# Patient Record
Sex: FEMALE | Race: WHITE | NOT HISPANIC OR LATINO | Employment: OTHER | URBAN - METROPOLITAN AREA
[De-identification: names, ages, dates, MRNs, and addresses within clinical notes are randomized per-mention and may not be internally consistent; named-entity substitution may affect disease eponyms.]

---

## 2017-01-23 ENCOUNTER — THERAPY VISIT (OUTPATIENT)
Dept: SLEEP MEDICINE | Facility: CLINIC | Age: 67
End: 2017-01-23
Payer: COMMERCIAL

## 2017-01-23 DIAGNOSIS — G47.9 SLEEP DISTURBANCE: ICD-10-CM

## 2017-01-23 DIAGNOSIS — G47.34 NOCTURNAL HYPOXIA: ICD-10-CM

## 2017-01-23 PROCEDURE — 95810 POLYSOM 6/> YRS 4/> PARAM: CPT | Performed by: INTERNAL MEDICINE

## 2017-01-25 PROBLEM — G47.33 OSA (OBSTRUCTIVE SLEEP APNEA): Status: ACTIVE | Noted: 2017-01-25

## 2017-01-25 NOTE — PROCEDURES
" SLEEP STUDY INTERPRETATION  POLYSOMNOGRAPHY REPORT      Patient: Mary Carrera  YOB: 1950  Study Date: 1/23/2017  MRN: [id number]  Referring Provider: MD Lim Laura  Ordering Provider: MD Cobian Abdullahi    Indications for Polysomnography: The patient is a 66 y old Female who is 5' 5\" and weighs 226.0 lbs.  Her BMI is 37.7, [place] sleepiness scale 3.0 and neck size is 43.0.  Relevant medical history includes depression, hyperlipidemia, obesity, nocturnal hypoxia, snoring and witnessed apnea.. A diagnostic polysomnogram was performed to evaluate for sleep apnea/PLMS/ hypoventilation/hypoxemia.    Polysomnogram Data:  A full night polysomnogram recorded the standard physiologic parameters including EEG, EOG, EMG, ECG, nasal and oral airflow.  Respiratory parameters of chest and abdominal movements were recorded with respiratory inductance plethysmography.  Oxygen saturation was recorded by pulse oximetry.      Sleep Architecture: All stages of sleep were achieved with reduced N3 sleep. Poor sleep efficiency and increased arousals.  The total recording time of the polysomnogram was 494.6 minutes.  The total sleep time was 367.5 minutes.  Sleep latency was normal at 19.5 minutes without the use of a sleep aid.  REM latency was 301.0 minutes.  Arousal index was increased at 80.7 arousals per hour.  Sleep efficiency was decreased at 74.3%.  Wake after sleep onset was 105.5 minutes.  The patient spent 30.3% of total sleep time in Stage N1, 51.8% in Stage N2, 2.9% in Stages N3, and 15.0% in REM.  Time in REM supine was - minutes.    Respiration: Moderate obstructive sleep apnea, REM predominant, may be underestimated with lack of supine sleep.    Events - The polysomnogram revealed a presence of 5 obstructive, 0 central, and 0 mixed apneas resulting in an apnea index of 0.8 events per hour.  There were 177 hypopneas resulting in a hypopnea index of 28.9 events per hour.  The combined apnea/hypopnea " index was 29.7 events per hour.  The REM AHI was 91.6 events per hour.  The supine AHI was non applicable.  The RERA index was 7.0 events per hour.   The RDI was 36.7 events per hour.    Snoring - was reported as loud.    Respiratory rate and pattern - was notable for normal respiratory rate and pattern.    Sustained Sleep Associated Hypoventilation - Transcutaneous carbon dioxide monitoring was used, however significant hypoventilation was not suggested by oximetry, or was not present with a maximum change of 9 mmHg. ABG ordered was not done.    Sleep Associated Hypoxemia - (Greater than 5 minutes O2 sat below 89%) was present.  Baseline oxygen saturation was 93.2%. Lowest oxygen saturation was 75.3%.  Time spent less than or equal to 88% was 16.9 minutes.  Time spent less than or equal to 89% was 34.8 minutes.  36.7 7.0 29.7     Movement Activity: Severe periodic leg movements associated with frequent arousals and were mainly or predominantly at the first half of sleep. No unusual nocturnal sleep behaviors.    Periodic Limb Activity - There were 306 PLMs during the entire study. The PLM index was 50.0 movements per hour.  The PLM Arousal Index was 21.1 per hour.    REM EMG Activity - Excessive transient / sustained muscle activity was not present.    Nocturnal Behavior - Abnormal sleep related behaviors were not noted during NREM / REM sleep.      Bruxism - None apparent.    Cardiac Summary: Normal sinus rhythm throughout the night with frequent multifocal PVC s.  The average pulse rate was 73.2 bpm.  The minimum pulse rate was 34.0 bpm while the maximum pulse rate was 99.9 bpm. The rhythm is normal sinus. Arrhythmias were noted, mainly frequent multifocal premature ventricular contractures.  Cardiac Comments: Normal Sinus Rhythm        Assessment:     Moderate Obstructive Sleep Apnea G47.33    Sleep Hypoxemia G47.36     Periodic Limb Movement Disorder G47.61      Recommendations:    Based on the presence of  moderate obstructive sleep apnea and excessive daytime sleepiness, treatment could be empirically initiated with Auto-titrating PAP therapy with a range of 8 - 16 cmH2O. Recommend clinical follow up with sleep management team.  Recommend repeat polysomnography with full night titration of positive airway pressure therapy for the control of sleep disordered breathing if Auto-PAP therapy  is unsuccessful.    Patient s sleep related hypoxemia may well improve by treating respiratory events, but if persists, would consider nocturnal oximeter to determine the need for nocturnal oxygen and in addition investigate if any underlying causes of hypoxemia including cardiopulmonary disease.    Re-evaluation and pharmacologic management of restless legs syndrome in the presence of periodic limb movements and Ferritin level in next clinic visit.    Advise regarding the risks of drowsy driving.    Suggest optimizing sleep schedule and avoiding sleep deprivation.    Weight management (if BMI > 30).    Follow up primary care doctor.        _____________________________________   electronically signed by: BOOKER MCKAY MD (1/25/17)         CC:  Lynn Lim MD          Range(%) Time in range (min) Time in range (%) Time in or below range (min) Time in or below range (%)   0.0 - 89.0 34.8 7.0% 34.8 7.0%   0.0 - 88.0 16.9 3.4% 16.9 3.4%      29.7 - 91.6

## 2017-02-09 ENCOUNTER — DOCUMENTATION ONLY (OUTPATIENT)
Dept: SLEEP MEDICINE | Facility: CLINIC | Age: 67
End: 2017-02-09

## 2017-02-09 ENCOUNTER — OFFICE VISIT (OUTPATIENT)
Dept: SLEEP MEDICINE | Facility: CLINIC | Age: 67
End: 2017-02-09
Payer: COMMERCIAL

## 2017-02-09 VITALS
WEIGHT: 220 LBS | HEART RATE: 90 BPM | DIASTOLIC BLOOD PRESSURE: 92 MMHG | SYSTOLIC BLOOD PRESSURE: 156 MMHG | BODY MASS INDEX: 36.65 KG/M2 | OXYGEN SATURATION: 96 % | HEIGHT: 65 IN

## 2017-02-09 DIAGNOSIS — G47.33 OSA (OBSTRUCTIVE SLEEP APNEA): Primary | ICD-10-CM

## 2017-02-09 PROCEDURE — 99214 OFFICE O/P EST MOD 30 MIN: CPT | Performed by: FAMILY MEDICINE

## 2017-02-09 NOTE — MR AVS SNAPSHOT
After Visit Summary   2/9/2017    Mary Carrera    MRN: [id number]           Patient Information     Date Of Birth          1950        Visit Information        Provider Department      2/9/2017 9:00 AM Mark Marsh MD [facility]        Today's Diagnoses     MANNIE (obstructive sleep apnea)    -  1       Care Instructions      Your BMI is Body mass index is 36.61 kg/(m^2).  Weight management is a personal decision.  If you are interested in exploring weight loss strategies, the following discussion covers the approaches that may be successful. Body mass index (BMI) is one way to tell whether you are at a healthy weight, overweight, or obese. It measures your weight in relation to your height.  A BMI of 18.5 to 24.9 is in the healthy range. A person with a BMI of 25 to 29.9 is considered overweight, and someone with a BMI of 30 or greater is considered obese. More than two-thirds of American adults are considered overweight or obese.  Being overweight or obese increases the risk for further weight gain. Excess weight may lead to heart disease and diabetes.  Creating and following plans for healthy eating and physical activity may help you improve your health.  Weight control is part of healthy lifestyle and includes exercise, emotional health, and healthy eating habits. Careful eating habits lifelong are the mainstay of weight control. Though there are significant health benefits from weight loss, long-term weight loss with diet alone may be very difficult to achieve- studies show long-term success with dietary management in less than 10% of people. Attaining a healthy weight may be especially difficult to achieve in those with severe obesity. In some cases, medications, devices and surgical management might be considered.  What can you do?  If you are overweight or obese and are interested in methods for weight loss, you should discuss this with your provider.      Consider reducing daily calorie intake by 500 calories.     Keep a food journal.     Avoiding skipping meals, consider cutting portions instead.    Diet combined with exercise helps maintain muscle while optimizing fat loss. Strength training is particularly important for building and maintaining muscle mass. Exercise helps reduce stress, increase energy, and improves fitness. Increasing exercise without diet control, however, may not burn enough calories to loose weight.       Start walking three days a week 10-20 minutes at a time    Work towards walking thirty minutes five days a week     Eventually, increase the speed of your walking for 1-2 minutes at time    In addition, we recommend that you review healthy lifestyles and methods for weight loss available through the National Institutes of Health patient information sites:  http://win.niddk.nih.gov/publications/index.htm    And look into health and wellness programs that may be available through your health insurance provider, employer, local community center, or humberto club.    Weight management plan: Patient was referred to their PCP to discuss a diet and exercise plan.   Your blood pressure was checked while you were in clinic today.  Please read the guidelines below about what these numbers mean and what you should do about them.  Your systolic blood pressure is the top number.  This is the pressure when the heart is pumping.  Your diastolic blood pressure is the bottom number.  This is the pressure in between beats.  If your systolic blood pressure is less than 120 and your diastolic blood pressure is less than 80, then your blood pressure is normal. There is nothing more that you need to do about it  If your systolic blood pressure is 120-139 or your diastolic blood pressure is 80-89, your blood pressure may be higher than it should be.  You should have your blood pressure re-checked within a year by a primary care provider.  If your systolic blood  "pressure is 140 or greater or your diastolic blood pressure is 90 or greater, you may have high blood pressure.  High blood pressure is treatable, but if left untreated over time it can put you at risk for heart attack, stroke, or kidney failure.  You should have your blood pressure re-checked by a primary care provider within the next four weeks.    Schedule CPAP  appointment and also follow up visit within 2 months. Use the CPAP every time you sleep. Monitor blood pressure and follow up with your primary care physician.    Thanks!    Mark Marsh MD, MPH        Follow-ups after your visit        Who to contact     If you have questions or need follow up information about today's clinic visit or your schedule please contact [facility] directly at [phone].  Normal or non-critical lab and imaging results will be communicated to you by MyChart, letter or phone within 4 business days after the clinic has received the results. If you do not hear from us within 7 days, please contact the clinic through [employer] or phone. If you have a critical or abnormal lab result, we will notify you by phone as soon as possible.  Submit refill requests through [employer] or call your pharmacy and they will forward the refill request to us. Please allow 3 business days for your refill to be completed.          Additional Information About Your Visit        [employer] Information     [employer] lets you send messages to your doctor, view your test results, renew your prescriptions, schedule appointments and more. To sign up, go to [email or web address] . Click on \"Log in\" on the left side of the screen, which will take you to the Welcome page. Then click on \"Sign up Now\" on the right side of the page.     You will be asked to enter the access code listed below, as well as some personal information. Please follow the directions to create your username and password.     Your access code is: " [email or web address]  Expires: 5/10/2017  9:38 AM     Your access code will  in 90 days. If you need help or a new code, please call your [facility] clinic or [phone].        Care EveryWhere ID     This is your Care EveryWhere ID. This could be used by other organizations to access your [facility] medical records  [id number]        Your Vitals Were     Pulse Height BMI (Body Mass Index) Pulse Oximetry Last Period       90 1.651 m (5' 5\") 36.61 kg/m2 96% 10/15/2003        Blood Pressure from Last 3 Encounters:   17 157/97   16 132/85   16 163/72    Weight from Last 3 Encounters:   17 99.791 kg (220 lb)   16 102.513 kg (226 lb)   16 109.1 kg (240 lb 8.4 oz)              We Performed the Following     Comprehensive DME        Primary Care Provider Office Phone # Fax #    Lynn Lim [phone]       [facility] [address]  [facility] [address]        Thank you!     Thank you for choosing [facility]  for your care. Our goal is always to provide you with excellent care. Hearing back from our patients is one way we can continue to improve our services. Please take a few minutes to complete the written survey that you may receive in the mail after your visit with us. Thank you!             Your Updated Medication List - Protect others around you: Learn how to safely use, store and throw away your medicines at www.disposemymeds.org.          This list is accurate as of: 17  9:38 AM.  Always use your most recent med list.                   Brand Name Dispense Instructions for use    acetaminophen 325 MG tablet    TYLENOL    100 tablet    Take 2 tablets (650 mg) by mouth every 4 hours as needed for other (surgical pain)       hydrocortisone 0.2 % cream    WESTCORT    45 g    Apply topically 2 times daily       ibuprofen 800 MG tablet    ADVIL/MOTRIN     Take 800 mg by mouth       multivitamin  with lutein Caps " per capsule      Take 1 capsule by mouth daily       oxyCODONE 5 MG IR tablet    ROXICODONE    60 tablet    Take 1-2 tablets (5-10 mg) by mouth every 3 hours as needed for moderate to severe pain       PREVIDENT 5000 BOOSTER PLUS 1.1 % Pste   Generic drug:  Sodium Fluoride          sertraline 100 MG tablet    ZOLOFT    90 tablet    Take 1 tablet (100 mg) by mouth daily

## 2017-02-09 NOTE — PROGRESS NOTES
"[facility]  Sleep Medicine Follow Up Note  February 9, 2017      Mary Carrera MRN# [id number]   Age: 66 year old YOB: 1950     Date of Consultation: February 9, 2017    Primary care provider: Lynn Lim     History taken from: Patient    Mary Carrera is a 66 year old female seen in the Sleep Clinic  for   Chief Complaint   Patient presents with     RECHECK     F/u Psg 1/23          Assessment and Plan:   Diagnoses:  (1) MANNIE  (2) Elevated BP    Discussion:    (1) MANNIE: Pt has moderate to severe MANNIE. The PSG study results were explained and discussed with the patient. The nature and pathophysiology of MANNIE were discussed and explained. The different treatment options of MANNIE were also reviewed. The benefits of treating the condition were discussed. After detail discussion, the patient will be placed on Auto-titrating PAP therapy with a range of 8 to 16 cmH2O. Auto-CPAP compliance was discussed. Pt was encouraged to use the CPAP every time she sleeps. Lifestyle changes with diet and exercises were encouraged. Pt will follow up within 2 months with Dr Cobian.     (2) Elevated BP: Pt was encouraged to monitor BP, keep a BP diary, and follow up with PCP for further assessment and possible management.    Patient understands and agrees with assessment and plan. All questions and concerns were addressed. Treatments were discussed along with their potential adverse interactions and possible side effects.    Pt was asked to not operate any heavy machinery, work at heights, or engage in life-threatening activities if she feels sleepy or \"drowsy.\"     Total of 30 minutes was spent in face to face contact with patient with > 50% in counseling and coordination of care.  Options for treatment and/or follow-up care were reviewed with the patient. Mary Carrera was engaged and actively involved in the decision making process. She verbalized understanding of the options " discussed and was satisfied with the final plan.    RTC in 2 months for follow up of MANNIE (or sooner if develops new or worsening symptoms).    Thank you for allowing me participate in the care of Mary Carrera.         Reason for Follow Up:   CC: 66 year old female pt presents for a follow up visit after completing an in-lab PSG sleep study         History of Present Illness:     Mary Carrera is a 66 year old female with history of major depressive disorder, hyperlipidemia, OA knee, and recent hypoxia post-op as well as acoustic neurinoma who presents to the [facility] in [place] for a follow up visit after completing an in-lab split-night PSG sleep study for evaluation of possible MANNIE. During her initial evaluation with Dr Cobian, the patient that she recently underwent right TKA on 12/5/16 at [facility]. During the hospitalization, she was noticed to have episodes of hypoxia and she required 2 L/min of oxygen. During the hospitalization, she had nocturnal oximetry while on 2 L/min of oxygen. This showed no sleep related hypoxia with O2, with time spent O2 sat below 88% of 4:18 minutes (0.9%), lowest O2 saturation was 72%. The patient was referred for evaluation of SBD. During this initial visit, she explained that she snores during the night and has witnessed apneas. She denied any nocturia. A PSG sleep study was order by Dr Cobian and this showed moderate to severe MANNIE with an AHI of 29.7 events per hour (the condition was REM predominant). Sleep related hypoxemia was also present during the study with 34.8 minutes below 89%. Some increased PLM index was increased at 50.0 movements per hour with a PLM arousal index of 21.1 per hour.    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)  Hand Dominance: RHD    --Coffeanated beverages: One coffee in the morning  --Drowsy driving / near incidents: No         Allergies:     Allergies   Allergen Reactions     No Known Allergies           Past  Medical History:     Past Medical History   Diagnosis Date     Chronic depressive personality disorder      Other and unspecified alcohol dependence, in remission      Hearing loss      Arthritis           Past Surgical History:     Past Surgical History   Procedure Laterality Date     C ligate fallopian tube  1982     Tubal Ligation     Hc removal of leg veins/ulcer  1988 & 1996     C nonspecific procedure  6/96     Acoustic Neuroma   left, dr tran/olivia QUIÑONES revision of cranial nerve  10/2006     recurrent acoustic neuroma     Colonoscopy  2007     WNL     Colonoscopy  5/13/2014     Procedure: COLONOSCOPY;  Surgeon: Priyanka Galvan MD;  Location: [facility]     Rotator cuff repair rt/lt  10/2015     right     Ent surgery       Arthroplasty knee Right 12/5/2016     Procedure: ARTHROPLASTY KNEE;  Surgeon: Severo Conroy MD;  Location: [facility] OR          Social History:     Social History     Social History     Marital Status: Single     Spouse Name: N/A     Number of Children: 3     Years of Education: 16     Occupational History     [employer]     Social History Main Topics     Smoking status: Never Smoker      Smokeless tobacco: Never Used     Alcohol Use: 2.5 oz/week     5 Standard drinks or equivalent per week      Comment: wine     Drug Use: No     Sexual Activity:     Partners: Male     Birth Control/ Protection: Surgical     Other Topics Concern      Service No     Blood Transfusions No     Caffeine Concern No     Occupational Exposure No     Stress Concern Yes     divorce and mental health issues     Weight Concern Yes     Special Diet Yes     low fat     Back Care No     Exercise Yes     Seat Belt No     Self-Exams Yes     Social History Narrative        Smoking:   Social History   Substance Use Topics     Smoking status: Never Smoker      Smokeless tobacco: Never Used     Alcohol Use: 2.5 oz/week     5 Standard drinks or equivalent per week      Comment:  wine     -Illicit drugs: None Reported  -Alcohol intake: More than 5 drinks a week with negative CAGE         Family History:     Family History   Problem Relation Age of Onset     Alcohol/Drug Father      HEART DISEASE Mother      CANCER No family hx of      DIABETES No family hx of           Immunization:     Immunization History   Administered Date(s) Administered     Hepatitis A Vac Ped/Adol-2 Dose 02/10/1999, 07/06/1999     Influenza (H1N1) 12/22/2009     Influenza (IIV3) 01/03/2001, 11/01/2008, 09/01/2009, 10/15/2012, 09/19/2013, 09/25/2013     Influenza Vaccine IM 3yrs+ 4 Valent IIV4 09/25/2015, 09/15/2016     TD (ADULT, 7+) 06/21/1994, 01/30/2004     Tdap (Adacel,Boostrix) 10/01/2011     Varicella 09/27/2015           Medications:     Current Outpatient Prescriptions   Medication Sig     oxyCODONE (ROXICODONE) 5 MG IR tablet Take 1-2 tablets (5-10 mg) by mouth every 3 hours as needed for moderate to severe pain     acetaminophen (TYLENOL) 325 MG tablet Take 2 tablets (650 mg) by mouth every 4 hours as needed for other (surgical pain)     multivitamin  with lutein (OCUVITE WITH LTEIN) CAPS Take 1 capsule by mouth daily     ibuprofen (ADVIL,MOTRIN) 800 MG tablet Take 800 mg by mouth     hydrocortisone (WESTCORT) 0.2 % cream Apply topically 2 times daily     PREVIDENT 5000 BOOSTER PLUS 1.1 % PSTE      sertraline (ZOLOFT) 100 MG tablet Take 1 tablet (100 mg) by mouth daily     No current facility-administered medications for this visit.          Review of Systems:   I have done 14 points of review systems and no obvious new pertinent findings reported.    General: no fever, chills, weakness  HENT: No loss of hearing, vertigo, ear ringing, sore throat, epistaxis  EYES: Diplopia, blurry vision, photophobia.  Pulmonary: No dyspnea, wheezing, cough, sputum, hemoptysis, chest pain  Sleep: No EDS, snoring, insomnia, cataplexy, restless legs, REM behavior   CVS: No chest discomfort, palpitations  GI: No diarrhea,  "constipation, dysphagia, early satiety, bleeding  Renal: No pain on urination, hematuria, freq micturation  Musculoskeletal: No muscle ache, joint pain, swelling  Skin: No rash, ecchymosis, itching, icterus  Neurology: No tingling, weakness, numbness  Heme: No easy bruisibility or bleeding  Allergy: runny nose, sneezing, urticeria  Psychiatry: no change in mood and affect         Physical Examination:   /97 mmHg  Pulse 90  Ht 1.651 m (5' 5\")  Wt 99.791 kg (220 lb)  BMI 36.61 kg/m2  SpO2 96%  LMP 10/15/2003    VS: Reviewed. Elevated BP noted.  General: Alert, oriented, not in distress  HEENT: Normocephalic and atraumatic; NL TM x 2; pupils are isocoric and equally responsive to the light. PERRLA. EOMI. Normal fundoscopic examination; Nasal turbinates are normal with a normal septal alignment;  Mallampati score: Grade IV; Tonsillar hypertrophy: +1; Pharynx with no erythema or exudates.  NECK: neck supple; symmetrical; no lymphadenopathy; no thyromegaly, bruit, JVD noted.  Lungs: both hemithoraces are symmetrical, normal to palpation, no dullness to percussion, auscultation of lungs revealed normal breath sounds with no expirium prolongation, wheezing, rhonci and crackles.  CVS: Normal S1 and S2 heart sounds with no extra heart sounds. No murmur, rubs, or clicks. Normal peripheral pulses throughout with no obvious peripheral edema.  Neurology: awake, alert, and oriented x 3. No obvious gross motor / sensorial deficits with normal strength in all extremities at 5/5 and normal sensation throughout. Cranial nerves are grossly intact with normal II to XII CN functions. Negative Romberg's test with normal gait. DTR are symmetric and normal at 2+/4.  Psychiatry: Mood and affect are appropriate. No SI/HI with adequate insight and judgement.    Mark Marsh MD, MPH  Sleep Medicine Clinic    [facility] [place] [facility] [address], [place] [address]   [phone] Clinic    Total time spent " with patient: 30 min. Over >50% of the time was spent for face to face counseling, education, and evaluation.

## 2017-02-09 NOTE — PATIENT INSTRUCTIONS
Your BMI is Body mass index is 36.61 kg/(m^2).  Weight management is a personal decision.  If you are interested in exploring weight loss strategies, the following discussion covers the approaches that may be successful. Body mass index (BMI) is one way to tell whether you are at a healthy weight, overweight, or obese. It measures your weight in relation to your height.  A BMI of 18.5 to 24.9 is in the healthy range. A person with a BMI of 25 to 29.9 is considered overweight, and someone with a BMI of 30 or greater is considered obese. More than two-thirds of American adults are considered overweight or obese.  Being overweight or obese increases the risk for further weight gain. Excess weight may lead to heart disease and diabetes.  Creating and following plans for healthy eating and physical activity may help you improve your health.  Weight control is part of healthy lifestyle and includes exercise, emotional health, and healthy eating habits. Careful eating habits lifelong are the mainstay of weight control. Though there are significant health benefits from weight loss, long-term weight loss with diet alone may be very difficult to achieve- studies show long-term success with dietary management in less than 10% of people. Attaining a healthy weight may be especially difficult to achieve in those with severe obesity. In some cases, medications, devices and surgical management might be considered.  What can you do?  If you are overweight or obese and are interested in methods for weight loss, you should discuss this with your provider.     Consider reducing daily calorie intake by 500 calories.     Keep a food journal.     Avoiding skipping meals, consider cutting portions instead.    Diet combined with exercise helps maintain muscle while optimizing fat loss. Strength training is particularly important for building and maintaining muscle mass. Exercise helps reduce stress, increase energy, and improves fitness.  Increasing exercise without diet control, however, may not burn enough calories to loose weight.       Start walking three days a week 10-20 minutes at a time    Work towards walking thirty minutes five days a week     Eventually, increase the speed of your walking for 1-2 minutes at time    In addition, we recommend that you review healthy lifestyles and methods for weight loss available through the National Institutes of Health patient information sites:  http://win.niddk.nih.gov/publications/index.htm    And look into health and wellness programs that may be available through your health insurance provider, employer, local community center, or humberto club.    Weight management plan: Patient was referred to their PCP to discuss a diet and exercise plan.   Your blood pressure was checked while you were in clinic today.  Please read the guidelines below about what these numbers mean and what you should do about them.  Your systolic blood pressure is the top number.  This is the pressure when the heart is pumping.  Your diastolic blood pressure is the bottom number.  This is the pressure in between beats.  If your systolic blood pressure is less than 120 and your diastolic blood pressure is less than 80, then your blood pressure is normal. There is nothing more that you need to do about it  If your systolic blood pressure is 120-139 or your diastolic blood pressure is 80-89, your blood pressure may be higher than it should be.  You should have your blood pressure re-checked within a year by a primary care provider.  If your systolic blood pressure is 140 or greater or your diastolic blood pressure is 90 or greater, you may have high blood pressure.  High blood pressure is treatable, but if left untreated over time it can put you at risk for heart attack, stroke, or kidney failure.  You should have your blood pressure re-checked by a primary care provider within the next four weeks.    Schedule CPAP  appointment  and also follow up visit within 2 months. Use the CPAP every time you sleep. Monitor blood pressure and follow up with your primary care physician.    Thanks!    Mark Marsh MD, MPH

## 2017-02-09 NOTE — PROGRESS NOTES
PATIENT CAME INTO [facility] AFTER SEE DOCTOR TO SCHEDULE PAP SETUP APPT. SHE IS SCHEDULED IN [facility] ON 02/22/2017 AT 2PM

## 2017-02-10 DIAGNOSIS — F33.42 DEPRESSION, MAJOR, RECURRENT, IN COMPLETE REMISSION (H): Primary | ICD-10-CM

## 2017-02-10 RX ORDER — SERTRALINE HYDROCHLORIDE 100 MG/1
TABLET, FILM COATED ORAL
Qty: 30 TABLET | Refills: 0 | Status: SHIPPED | OUTPATIENT
Start: 2017-02-10 | End: 2017-03-09

## 2017-02-10 NOTE — TELEPHONE ENCOUNTER
Pending Prescriptions:                       Disp   Refills    sertraline (ZOLOFT) 100 MG tablet [Pharma*30 tab*0            Sig: TAKE 1 TABLET (100 MG) BY MOUTH DAILY    Last refill was 1- for one year.  No appt scheduled at this time.  Please fax, 30 and send to [facility]  [phone] (home) [phone] (work)

## 2017-02-10 NOTE — TELEPHONE ENCOUNTER
Please let the patient know that a 30 day refill has been sent for their prescription.  They are due for a return non-fasting office visit within 30 days.  Failure to schedule an appointment may result in no further refills of his/her medication.

## 2017-02-27 ENCOUNTER — DOCUMENTATION ONLY (OUTPATIENT)
Dept: SLEEP MEDICINE | Facility: CLINIC | Age: 67
End: 2017-02-27

## 2017-02-27 NOTE — PROGRESS NOTES
Patient was offered choice of vendor and chose [facility].  Patient Mary Carrera was set up at [facility] on February 27, 2017. Patient received a Resmed AirSense 10 Auto. Pressures were set at 8-16 cm H2O.   Patient s ramp is 5 cm H2O for Auto and FLEX/EPR is EPR, 2.  Patient received a Resmed Mask name: AIRFIT N20  Nasal mask Size Medium, heated tubing and heated humidifier.  Patient is enrolled in the STM Program and does not need to meet compliance. Patient has a follow up on 4/11/2017 with Dr. Cobian.    Lillian Ybarra

## 2017-03-09 ENCOUNTER — OFFICE VISIT (OUTPATIENT)
Dept: FAMILY MEDICINE | Facility: CLINIC | Age: 67
End: 2017-03-09

## 2017-03-09 ENCOUNTER — TELEPHONE (OUTPATIENT)
Dept: FAMILY MEDICINE | Facility: CLINIC | Age: 67
End: 2017-03-09

## 2017-03-09 VITALS
TEMPERATURE: 98.4 F | WEIGHT: 238.2 LBS | BODY MASS INDEX: 40.67 KG/M2 | SYSTOLIC BLOOD PRESSURE: 160 MMHG | OXYGEN SATURATION: 97 % | DIASTOLIC BLOOD PRESSURE: 88 MMHG | HEIGHT: 64 IN | HEART RATE: 80 BPM

## 2017-03-09 DIAGNOSIS — F33.42 DEPRESSION, MAJOR, RECURRENT, IN COMPLETE REMISSION (H): ICD-10-CM

## 2017-03-09 DIAGNOSIS — I10 BENIGN ESSENTIAL HYPERTENSION: Primary | ICD-10-CM

## 2017-03-09 PROCEDURE — 99213 OFFICE O/P EST LOW 20 MIN: CPT | Performed by: PHYSICIAN ASSISTANT

## 2017-03-09 RX ORDER — LISINOPRIL 10 MG/1
10 TABLET ORAL EVERY MORNING
Qty: 30 TABLET | Refills: 0 | COMMUNITY
Start: 2017-03-09 | End: 2017-04-13

## 2017-03-09 RX ORDER — SERTRALINE HYDROCHLORIDE 100 MG/1
100 TABLET, FILM COATED ORAL DAILY
Qty: 90 TABLET | Refills: 3 | Status: SHIPPED | OUTPATIENT
Start: 2017-03-09 | End: 2018-03-14

## 2017-03-09 RX ORDER — LISINOPRIL 20 MG/1
20 TABLET ORAL DAILY
Qty: 30 TABLET | Refills: 0 | Status: SHIPPED | OUTPATIENT
Start: 2017-03-09 | End: 2017-03-09 | Stop reason: DRUGHIGH

## 2017-03-09 NOTE — TELEPHONE ENCOUNTER
Update for Mary since appt: Talked to Dr. Lim about starting lisinopril. She agreed this was a good idea, but said Mary may want to just do 10 mg daily, where the prescription is for 20 mg. I will call pharmacy to change to 10 mg daily. She can take 1/2 tablet for a week as discussed in office to make sure she is tolerating before increasing to 10 mg daily.

## 2017-03-09 NOTE — NURSING NOTE
Mary Block is here for med check and would like her sertraline filled for 1 year    Pre-Visit Screening :  Immunizations : up to date-due for Prevnar 13  Colonoscopy : is up to date  Mammogram : is up to date  Asthma Action Test/Plan : NA  PHQ9/GAD7 :  Done today  Pulse - regular  My Chart - declines    CLASSIFICATION OF OVERWEIGHT AND OBESITY BY BMI                         Obesity Class           BMI(kg/m2)  Underweight                                    < 18.5  Normal                                         18.5-24.9  Overweight                                     25.0-29.9  OBESITY                     I                  30.0-34.9                              II                 35.0-39.9  EXTREME OBESITY             III                >40                             Patient's  BMI Body mass index is 40.89 kg/(m^2).  http://hin.nhlbi.nih.gov/menuplanner/menu.cgi  Questioned patient about current smoking habits.  Pt. has never smoked.

## 2017-03-09 NOTE — PROGRESS NOTES
"CC: Medication check    History:  Mary is here today for a medication check. She has been doing very well on sertraline 100 mg daily.  She has been taking this for several years.     She recently had a sleep study, and was started on a CPAP 2 weeks ago.     BP has been elevated to 160s both in February and March. Mary mentions her eye sight has been getting a little less clear, over the past month or so, where she is planning to see eye doctor.     Has been exercising more with knee replacement healing well, where she has been cleared from PT. Has been under more stress, and has been very busy. Works in admin support at school, and is over capacity, and is under stress there. Has stress with roommate. Trying to retire after this school year. Planning to move in with daughter and her family.     Tries to make healthy, low salt meals, but alternates cooking responsibilities with a roommate who does not pay as much attention to salt.     PMH, MEDICATIONS, ALLERGIES, SOCIAL AND FAMILY HISTORY in [facility] and reviewed by me personally.      ROS negative other than the symptoms noted above in the HPI.        Examination   /88 (BP Location: Left arm, Patient Position: Chair, Cuff Size: Adult Large)  Pulse 80  Temp 98.4  F (36.9  C) (Oral)  Ht 1.626 m (5' 4\")  Wt 108 kg (238 lb 3.2 oz)  LMP 10/15/2003  SpO2 97%  BMI 40.89 kg/m2       Constitutional: Sitting comfortably, in no acute distress. Vital signs noted. BP elevated  Eyes: pupils equal round reactive to light and accomodation, extra ocular movements intact  Cardiovascular:  regular rate and rhythm, no murmurs, clicks, or gallops. Radial pulses intact and equal bilaterally. No peripheral edema.  Respiratory:  normal respiratory rate and rhythm, lungs clear to auscultation  Psychiatric: mentation appears normal and affect normal/bright        A/P    ICD-10-CM    1. Benign essential hypertension I10 lisinopril (PRINIVIL/ZESTRIL) 20 MG tablet   2. Depression, major, " recurrent, in complete remission (H) F33.42 sertraline (ZOLOFT) 100 MG tablet       DISCUSSION: Mary is doing well on sertraline. Provided with 1 year refill of 100 mg daily.    Mary's BP is elevated to the point where I recommended she start a blood pressure medication. Recommended she start taking lisinopril 10 mg daily in the morning. She can start on 1/2 tablet daily to make sure she tolerates- warned of side effects, including dry cough. BMP last checked 12/2016 and showed normal potassium, and very mildly elevated sodium. Will have her return in 1 month for recheck BP, fasting labs. She should check BP weekly with goal of <140/90. For her eyes if she continues to notice worsening despite this medication she should see eye doctor immediately. Otherwise, she should schedule with an eye doctor in the next 1-2 months.       follow up visit 1 month, fasting labs    Maricarmen Harrison PA-C  [facility]

## 2017-03-09 NOTE — MR AVS SNAPSHOT
"              After Visit Summary   3/9/2017    Mary Carrera    MRN: [id number]           Patient Information     Date Of Birth          1950        Visit Information        Provider Department      3/9/2017 2:00 PM Maricarmen Harrison PA-C [facility]        Today's Diagnoses     Benign essential hypertension    -  1    Depression, major, recurrent, in complete remission (H)           Follow-ups after your visit        Follow-up notes from your care team     Return in about 1 month (around 4/9/2017) for BP Recheck, Lab Work (fasting).      Your next 10 appointments already scheduled     Apr 13, 2017  9:30 AM CDT   Return Sleep Patient with Mark Marsh MD   [facility] ([facility])    [address] [facility] [address] [place] [id number] [phone]              Who to contact     If you have questions or need follow up information about today's clinic visit or your schedule please contact [facility] directly at [phone].  Normal or non-critical lab and imaging results will be communicated to you by MyChart, letter or phone within 4 business days after the clinic has received the results. If you do not hear from us within 7 days, please contact the clinic through MyChart or phone. If you have a critical or abnormal lab result, we will notify you by phone as soon as possible.  Submit refill requests through [employer] or call your pharmacy and they will forward the refill request to us. Please allow 3 business days for your refill to be completed.          Additional Information About Your Visit        Care EveryWhere ID     This is your Care EveryWhere ID. This could be used by other organizations to access your [facility] medical records  [id number]        Your Vitals Were     Pulse Temperature Height Last Period Pulse Oximetry BMI (Body Mass Index)    80 98.4  F (36.9  C) (Oral) 1.626 m (5' 4\") " 10/15/2003 97% 40.89 kg/m2       Blood Pressure from Last 3 Encounters:   03/09/17 160/88   02/09/17 (!) 157/97   12/28/16 132/85    Weight from Last 3 Encounters:   03/09/17 108 kg (238 lb 3.2 oz)   02/09/17 99.8 kg (220 lb)   12/28/16 102.5 kg (226 lb)              Today, you had the following     No orders found for display         Today's Medication Changes          These changes are accurate as of: 3/9/17  2:57 PM.  If you have any questions, ask your nurse or doctor.               These medicines have changed or have updated prescriptions.        Dose/Directions    sertraline 100 MG tablet   Commonly known as:  ZOLOFT   This may have changed:  See the new instructions.   Used for:  Depression, major, recurrent, in complete remission (H)   Changed by:  Maricarmen Harrison PA-C        Dose:  100 mg   Take 1 tablet (100 mg) by mouth daily   Quantity:  90 tablet   Refills:  3            Where to get your medicines      These medications were sent to [id number] - [place], [place] - [address]  [address], [facility] [address]     Phone:  [phone]     sertraline 100 MG tablet                Primary Care Provider Office Phone # Fax #    Lynn Lim [phone]       [place] [address] [facility]  [address]  [facility] [address]        Thank you!     Thank you for choosing [facility]  for your care. Our goal is always to provide you with excellent care. Hearing back from our patients is one way we can continue to improve our services. Please take a few minutes to complete the written survey that you may receive in the mail after your visit with us. Thank you!             Your Updated Medication List - Protect others around you: Learn how to safely use, store and throw away your medicines at www.disposemymeds.org.          This list is accurate as of: 3/9/17  2:57 PM.  Always use your most recent med list.                   Brand Name  Dispense Instructions for use    acetaminophen 325 MG tablet    TYLENOL    100 tablet    Take 2 tablets (650 mg) by mouth every 4 hours as needed for other (surgical pain)       hydrocortisone 0.2 % cream    WESTCORT    45 g    Apply topically 2 times daily       ibuprofen 800 MG tablet    ADVIL/MOTRIN     Take 800 mg by mouth       lisinopril 10 MG tablet    PRINIVIL/ZESTRIL    30 tablet    Take 1 tablet (10 mg) by mouth every morning       multivitamin  with lutein Caps per capsule      Take 1 capsule by mouth daily       order for DME      Equipment ordered: Complex Media Auto PAP Mask type: Nasal Settings: 8-16 CM H2O       PREVIDENT 5000 BOOSTER PLUS 1.1 % Pste   Generic drug:  Sodium Fluoride          sertraline 100 MG tablet    ZOLOFT    90 tablet    Take 1 tablet (100 mg) by mouth daily

## 2017-03-10 ASSESSMENT — PATIENT HEALTH QUESTIONNAIRE - PHQ9: SUM OF ALL RESPONSES TO PHQ QUESTIONS 1-9: 0

## 2017-04-13 ENCOUNTER — OFFICE VISIT (OUTPATIENT)
Dept: SLEEP MEDICINE | Facility: CLINIC | Age: 67
End: 2017-04-13
Payer: COMMERCIAL

## 2017-04-13 VITALS
WEIGHT: 230 LBS | HEIGHT: 65 IN | SYSTOLIC BLOOD PRESSURE: 176 MMHG | BODY MASS INDEX: 38.32 KG/M2 | DIASTOLIC BLOOD PRESSURE: 85 MMHG | RESPIRATION RATE: 14 BRPM | HEART RATE: 72 BPM

## 2017-04-13 DIAGNOSIS — I10 ESSENTIAL HYPERTENSION: Primary | ICD-10-CM

## 2017-04-13 DIAGNOSIS — G47.33 OSA (OBSTRUCTIVE SLEEP APNEA): ICD-10-CM

## 2017-04-13 PROCEDURE — 99214 OFFICE O/P EST MOD 30 MIN: CPT | Performed by: FAMILY MEDICINE

## 2017-04-13 RX ORDER — LISINOPRIL 10 MG/1
10 TABLET ORAL EVERY MORNING
Qty: 30 TABLET | Refills: 0 | Status: SHIPPED | OUTPATIENT
Start: 2017-04-13 | End: 2017-04-20

## 2017-04-13 NOTE — PROGRESS NOTES
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 The PSG study results were explained and discussed with the patient once again today. The patient is compliant with the CPAP treatment and this effective at treating the condition. However, the device is operating almost a maximum pressures. As such, the Auto-Titrating PAP device was adjusted to a minimum pressure of 9 cmH2O and maximum pressure of 18 cmH2O. Auto-CPAP compliance was discussed. Pt was encouraged to use the CPAP every time she sleeps. Lifestyle changes with diet and exercises were encouraged. Pt will follow up within 4 to 6 weeks with a virtual visit.      (2) Elevated BP: Pt was encouraged to monitor BP, keep a BP diary, and follow up with PCP for further assessment and possible management once again today. Lisinopril 10 mg was refill today.     Patient understands and agrees with assessment and plan. All questions and concerns were addressed. Treatments were discussed along with their potential adverse interactions and possible side effects.     Pt was asked to not operate any heavy machinery, work at heights, or engage in life-threatening activities if she feels sleepy or \"drowsy.\"      Total of 30 minutes was spent in face to face contact with patient with > 50% in counseling and coordination of care. Options for treatment and/or follow-up care were reviewed with the patient. Mary Carrera was engaged and actively involved in the " decision making process. She verbalized understanding of the options discussed and was satisfied with the final plan.     RTC in 1 months for follow up of MANNIE (or sooner if develops new or worsening symptoms) - virtual visit.     Thank you for allowing me participate in the care of Mary Carrera.     Reason for Follow Up:     CC: 66 year old female pt presents for a follow up visit for MANNIE.     History of Present Illness:      Mary Carrera is a 66 year old female with history of major depressive disorder, hyperlipidemia, OA knee, and recent hypoxia post-op as well as acoustic neurinoma who presents to the [facility] in [place] for a follow up visit after completing an in-lab split-night PSG sleep study for evaluation of possible MANNIE. During her initial evaluation with Dr Cobian, the patient that she recently underwent right TKA on 12/5/16 at [facility]. During the hospitalization, she was noticed to have episodes of hypoxia and she required 2 L/min of oxygen. During the hospitalization, she had nocturnal oximetry while on 2 L/min of oxygen. This showed no sleep related hypoxia with O2, with time spent O2 sat below 88% of 4:18 minutes (0.9%), lowest O2 saturation was 72%. The patient was referred for evaluation of SBD. During this initial visit, she explained that she snores during the night and has witnessed apneas. She denied any nocturia. A PSG sleep study was order by Dr Cobian and this showed moderate to severe MANNIE with an AHI of 29.7 events per hour (the condition was REM predominant). Sleep related hypoxemia was also present during the study with 34.8 minutes below 89%. Some increased PLM index was increased at 50.0 movements per hour with a PLM arousal index of 21.1 per hour.    The patient was placed on an Auto-titration PAP device with minimum pressure of 8 cmH2O and maximum pressure of 16 cmH2O. The PAP device download showed the patient is compliant with PAP treatment. She is using  the device 93% of the time with 93% of the time used over 4 hours (average use is over 8 hours). The 95th percentile pressure is 14.5 cmH2O (maximum pressure is at 15.6 cmH2O). Some mild leakage is noted at 95th percentile at 24.6 L/min. The residual AHI is 0.6 events per hour. No obvious central events or periodic breathing noted.    Pt is using a nasal interface. She explains that with the CPAP, she is able to sleep better throughout the night and she reports some refreshed sleeping. She also denies any gasping / choking for air, snoring, ot witnessed apneas when using the device. Pt denies any aerophagia, GERD sxs, xerostomia, skin problems, CP, SOB, or any other sxs or concerns. She also reports improved sleep inertia and EDS. Pt likes the CPAP.    Pt explains that she was recently started on lisinopril 10 mg, but she ran out of the medication 3 days ago. She needs to make an appointment with her PCP again.    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)  Hand Dominance: RHD     --Coffeanated beverages: One coffee in the morning  --Drowsy driving / near incidents: No     Allergies:           Allergies   Allergen Reactions     No Known Allergies        Past Medical History:       Past Medical History         Past Medical History   Diagnosis Date     Chronic depressive personality disorder       Other and unspecified alcohol dependence, in remission       Hearing loss       Arthritis           Past Surgical History:       Past Surgical History           Past Surgical History   Procedure Laterality Date     C ligate fallopian tube   1982       Tubal Ligation     Hc removal of leg veins/ulcer   1988 & 1996     C nonspecific procedure   6/96       Acoustic Neuroma left, dr tran/olivia     C revision of cranial nerve   10/2006       recurrent acoustic neuroma     Colonoscopy   2007       WNL     Colonoscopy   5/13/2014       Procedure: COLONOSCOPY; Surgeon: Priyanka Galvan MD; Location:  GI      Rotator cuff repair rt/lt   10/2015       right     Ent surgery         Arthroplasty knee Right 12/5/2016       Procedure: ARTHROPLASTY KNEE; Surgeon: Severo Conroy MD; Location: [facility] OR         Social History:       Social History    Social History            Social History     Marital Status: Single       Spouse Name: N/A     Number of Children: 3     Years of Education: 16           Occupational History     [employer]             Social History Main Topics     Smoking status: Never Smoker      Smokeless tobacco: Never Used     Alcohol Use: 2.5 oz/week       5 Standard drinks or equivalent per week         Comment: wine     Drug Use: No     Sexual Activity:        Partners: Male       Birth Control/ Protection: Surgical            Other Topics Concern      Service No     Blood Transfusions No     Caffeine Concern No     Occupational Exposure No     Stress Concern Yes       divorce and mental health issues     Weight Concern Yes     Special Diet Yes       low fat     Back Care No     Exercise Yes     Seat Belt No     Self-Exams Yes      Social History Narrative            Smoking:          Social History   Substance Use Topics     Smoking status: Never Smoker      Smokeless tobacco: Never Used     Alcohol Use: 2.5 oz/week       5 Standard drinks or equivalent per week         Comment: wine      -Illicit drugs: None Reported  -Alcohol intake: More than 5 drinks a week with negative CAGE     Family History:       Family History          Family History   Problem Relation Age of Onset     Alcohol/Drug Father       HEART DISEASE Mother       CANCER No family hx of       DIABETES No family hx of           Immunization:           Immunization History   Administered Date(s) Administered     Hepatitis A Vac Ped/Adol-2 Dose 02/10/1999, 07/06/1999     Influenza (H1N1) 12/22/2009     Influenza (IIV3) 01/03/2001, 11/01/2008, 09/01/2009, 10/15/2012, 09/19/2013, 09/25/2013      "Influenza Vaccine IM 3yrs+ 4 Valent IIV4 09/25/2015, 09/15/2016     TD (ADULT, 7+) 06/21/1994, 01/30/2004     Tdap (Adacel,Boostrix) 10/01/2011     Varicella 09/27/2015      Medications:           Current Outpatient Prescriptions   Medication Sig     oxyCODONE (ROXICODONE) 5 MG IR tablet Take 1-2 tablets (5-10 mg) by mouth every 3 hours as needed for moderate to severe pain     acetaminophen (TYLENOL) 325 MG tablet Take 2 tablets (650 mg) by mouth every 4 hours as needed for other (surgical pain)     multivitamin with lutein (OCUVITE WITH LTEIN) CAPS Take 1 capsule by mouth daily     ibuprofen (ADVIL,MOTRIN) 800 MG tablet Take 800 mg by mouth     hydrocortisone (WESTCORT) 0.2 % cream Apply topically 2 times daily     PREVIDENT 5000 BOOSTER PLUS 1.1 % PSTE       sertraline (ZOLOFT) 100 MG tablet Take 1 tablet (100 mg) by mouth daily      No current facility-administered medications for this visit.      Review of Systems:   I have done 14 points of review systems and no obvious new pertinent findings reported.     General: no fever, chills, weakness  HENT: No loss of hearing, vertigo, ear ringing, sore throat, epistaxis  EYES: Diplopia, blurry vision, photophobia.  Pulmonary: No dyspnea, wheezing, cough, sputum, hemoptysis, chest pain  Sleep: No EDS, snoring, insomnia, cataplexy, restless legs, REM behavior   CVS: No chest discomfort, palpitations  GI: No diarrhea, constipation, dysphagia, early satiety, bleeding  Renal: No pain on urination, hematuria, freq micturation  Musculoskeletal: No muscle ache, joint pain, swelling  Skin: No rash, ecchymosis, itching, icterus  Neurology: No tingling, weakness, numbness  Heme: No easy bruisibility or bleeding  Allergy: runny nose, sneezing, urticeria  Psychiatry: no change in mood and affect     Physical Examination:   /85 (BP Location: Right arm, Patient Position: Chair, Cuff Size: Adult Regular)  Pulse 72  Resp 14  Ht 1.651 m (5' 5\")  Wt 104.3 kg (230 lb)  LMP " 10/15/2003  BMI 38.27 kg/m2     VS: Reviewed. Elevated BP noted.  General: Alert, oriented, not in distress  HEENT: Normocephalic and atraumatic; NL TM x 2; pupils are isocoric and equally responsive to the light. PERRLA. EOMI. Normal fundoscopic examination; Nasal turbinates are normal with a normal septal alignment; Mallampati score: Grade IV; Tonsillar hypertrophy: +1; Pharynx with no erythema or exudates.  NECK: neck supple; symmetrical; no lymphadenopathy; no thyromegaly, bruit, JVD noted.  Lungs: both hemithoraces are symmetrical, normal to palpation, no dullness to percussion, auscultation of lungs revealed normal breath sounds with no expirium prolongation, wheezing, rhonci and crackles.  CVS: Normal S1 and S2 heart sounds with no extra heart sounds. No murmur, rubs, or clicks. Normal peripheral pulses throughout with no obvious peripheral edema.  Neurology: awake, alert, and oriented x 3. No obvious gross motor / sensorial deficits with normal strength in all extremities at 5/5 and normal sensation throughout. Cranial nerves are grossly intact with normal II to XII CN functions. Negative Romberg's test with normal gait. DTR are symmetric and normal at 2+/4.  Psychiatry: Mood and affect are appropriate. No SI/HI with adequate insight and judgement.     Mark Marsh MD, MPH  Sleep Medicine Clinic     [facility]  [address], [place] [address]   [phone] Clinic     Total time spent with patient: 30 min. Over >50% of the time was spent for face to face counseling, education, and evaluation.

## 2017-04-13 NOTE — PATIENT INSTRUCTIONS

## 2017-04-13 NOTE — MR AVS SNAPSHOT
After Visit Summary   4/13/2017    Mary Carrera    MRN: [id number]           Patient Information     Date Of Birth          1950        Visit Information        Provider Department      4/13/2017 2:00 PM Mark Marsh MD [facility]        Today's Diagnoses     Essential hypertension    -  1    MANNIE (obstructive sleep apnea)          Care Instructions      Your BMI is Body mass index is 38.27 kg/(m^2).  Weight management is a personal decision.  If you are interested in exploring weight loss strategies, the following discussion covers the approaches that may be successful. Body mass index (BMI) is one way to tell whether you are at a healthy weight, overweight, or obese. It measures your weight in relation to your height.  A BMI of 18.5 to 24.9 is in the healthy range. A person with a BMI of 25 to 29.9 is considered overweight, and someone with a BMI of 30 or greater is considered obese. More than two-thirds of American adults are considered overweight or obese.  Being overweight or obese increases the risk for further weight gain. Excess weight may lead to heart disease and diabetes.  Creating and following plans for healthy eating and physical activity may help you improve your health.  Weight control is part of healthy lifestyle and includes exercise, emotional health, and healthy eating habits. Careful eating habits lifelong are the mainstay of weight control. Though there are significant health benefits from weight loss, long-term weight loss with diet alone may be very difficult to achieve- studies show long-term success with dietary management in less than 10% of people. Attaining a healthy weight may be especially difficult to achieve in those with severe obesity. In some cases, medications, devices and surgical management might be considered.  What can you do?  If you are overweight or obese and are interested in methods for weight loss, you should discuss  this with your provider.     Consider reducing daily calorie intake by 500 calories.     Keep a food journal.     Avoiding skipping meals, consider cutting portions instead.    Diet combined with exercise helps maintain muscle while optimizing fat loss. Strength training is particularly important for building and maintaining muscle mass. Exercise helps reduce stress, increase energy, and improves fitness. Increasing exercise without diet control, however, may not burn enough calories to loose weight.       Start walking three days a week 10-20 minutes at a time    Work towards walking thirty minutes five days a week     Eventually, increase the speed of your walking for 1-2 minutes at time    In addition, we recommend that you review healthy lifestyles and methods for weight loss available through the National Institutes of Health patient information sites:  http://win.niddk.nih.gov/publications/index.htm    And look into health and wellness programs that may be available through your health insurance provider, employer, local community center, or humberto club.    Weight management plan: Patient was referred to their PCP to discuss a diet and exercise plan.  Meet with the nurse and schedule virtual visit for a month from now. Also, follow up with your doctor regarding elevated blood pressure.    Thank you!    Mark Marsh MD, MPH  Clinical Sleep and Occupational / Environmental Medicine          Follow-ups after your visit        Who to contact     If you have questions or need follow up information about today's clinic visit or your schedule please contact [facility] directly at [phone].  Normal or non-critical lab and imaging results will be communicated to you by MyChart, letter or phone within 4 business days after the clinic has received the results. If you do not hear from us within 7 days, please contact the clinic through MyChart or phone. If you have a critical or abnormal  "lab result, we will notify you by phone as soon as possible.  Submit refill requests through [employer] or call your pharmacy and they will forward the refill request to us. Please allow 3 business days for your refill to be completed.          Additional Information About Your Visit        Care EveryWhere ID     This is your Care EveryWhere ID. This could be used by other organizations to access your [place] medical records  [id number]        Your Vitals Were     Pulse Respirations Height Last Period BMI (Body Mass Index)       72 14 1.651 m (5' 5\") 10/15/2003 38.27 kg/m2        Blood Pressure from Last 3 Encounters:   04/13/17 176/85   03/09/17 160/88   02/09/17 (!) 157/97    Weight from Last 3 Encounters:   04/13/17 104.3 kg (230 lb)   03/09/17 108 kg (238 lb 3.2 oz)   02/09/17 99.8 kg (220 lb)              We Performed the Following     Comprehensive DME          Where to get your medicines      These medications were sent to [id number] - [place], [place] - [address]  [address], [facility] [address]     Phone:  [phone]     lisinopril 10 MG tablet          Primary Care Provider Office Phone # Fax #    Lynn Lim [phone]       [place] [address] [facility]  [address]  [facility] [address]        Thank you!     Thank you for choosing [facility]  for your care. Our goal is always to provide you with excellent care. Hearing back from our patients is one way we can continue to improve our services. Please take a few minutes to complete the written survey that you may receive in the mail after your visit with us. Thank you!             Your Updated Medication List - Protect others around you: Learn how to safely use, store and throw away your medicines at www.disposemymeds.org.          This list is accurate as of: 4/13/17  2:57 PM.  Always use your most recent med list.                   Brand Name Dispense Instructions for use    " acetaminophen 325 MG tablet    TYLENOL    100 tablet    Take 2 tablets (650 mg) by mouth every 4 hours as needed for other (surgical pain)       hydrocortisone 0.2 % cream    WESTCORT    45 g    Apply topically 2 times daily       ibuprofen 800 MG tablet    ADVIL/MOTRIN     Take 800 mg by mouth       lisinopril 10 MG tablet    PRINIVIL/ZESTRIL    30 tablet    Take 1 tablet (10 mg) by mouth every morning       multivitamin  with lutein Caps per capsule      Take 1 capsule by mouth daily       order for DME      Equipment ordered: RESMED Auto PAP Mask type: Nasal Settings: 8-16 CM H2O       PREVIDENT 5000 BOOSTER PLUS 1.1 % Pste   Generic drug:  Sodium Fluoride          sertraline 100 MG tablet    ZOLOFT    90 tablet    Take 1 tablet (100 mg) by mouth daily

## 2017-04-13 NOTE — NURSING NOTE
"Chief Complaint   Patient presents with     RECHECK     f/u pap       Initial /85 (BP Location: Right arm, Patient Position: Chair, Cuff Size: Adult Regular)  Pulse 72  Resp 14  Ht 1.651 m (5' 5\")  Wt 104.3 kg (230 lb)  LMP 10/15/2003  BMI 38.27 kg/m2 Estimated body mass index is 38.27 kg/(m^2) as calculated from the following:    Height as of this encounter: 1.651 m (5' 5\").    Weight as of this encounter: 104.3 kg (230 lb).  Medication Reconciliation: complete     Deepika Salomon CNA, Clinical Coordinator  "

## 2017-04-20 ENCOUNTER — TELEPHONE (OUTPATIENT)
Dept: FAMILY MEDICINE | Facility: CLINIC | Age: 67
End: 2017-04-20

## 2017-04-20 DIAGNOSIS — I10 ESSENTIAL HYPERTENSION: ICD-10-CM

## 2017-04-20 RX ORDER — LISINOPRIL 10 MG/1
10 TABLET ORAL EVERY MORNING
Qty: 30 TABLET | Refills: 0 | COMMUNITY
Start: 2017-04-20 | End: 2017-09-07

## 2017-04-20 NOTE — TELEPHONE ENCOUNTER
Ok refill of lisinopril for 1 month sent to [facility]. Pt needs fasting OV for refills    Thanks,Yuliet  [phone] (home)  [phone] (cell)

## 2017-05-18 ENCOUNTER — VIRTUAL VISIT (OUTPATIENT)
Dept: SLEEP MEDICINE | Facility: CLINIC | Age: 67
End: 2017-05-18

## 2017-05-18 DIAGNOSIS — G47.33 OSA (OBSTRUCTIVE SLEEP APNEA): Primary | ICD-10-CM

## 2017-05-18 NOTE — PROGRESS NOTES
The current PAP download shows compliance with 100% usage. The patient uses the device an average of 8 hours. The device is effective at treating the condition with a residual AHI of 0.8 events per hour. The 95th percentile pressure is 13.9 events per hour. There is some leakage noted at 46.9 L/min.    The patient explains that she feels happy and comfortable with the PAP device. She denies any snoring, gasping, or choking for air. She also denies any EDS or other sxs when using the PAP device. She denies any bothersome leakage, but explains that she sleeps with the dental guard for bruxism and also she sleeps with her mouth opened.    The patient will be prescribed a chinstrap to reduce the leakage. The patient will be contacting me if she has any concerns. Otherwise, she will follow up with me within one year.    Mark Marsh MD, MPH  Clinical Sleep Medicine

## 2017-05-18 NOTE — MR AVS SNAPSHOT
After Visit Summary   5/18/2017    Mary Carrera    MRN: [id number]           Patient Information     Date Of Birth          1950        Visit Information        Provider Department      5/18/2017 1:00 PM Mark Marsh MD [facility]        Today's Diagnoses     MANNIE (obstructive sleep apnea)    -  1       Follow-ups after your visit        Follow-up notes from your care team     Return in about 1 year (around 5/18/2018), or if symptoms worsen or fail to improve.      Who to contact     If you have questions or need follow up information about today's clinic visit or your schedule please contact [facility] directly at [phone].  Normal or non-critical lab and imaging results will be communicated to you by MyChart, letter or phone within 4 business days after the clinic has received the results. If you do not hear from us within 7 days, please contact the clinic through MyChart or phone. If you have a critical or abnormal lab result, we will notify you by phone as soon as possible.  Submit refill requests through [employer] or call your pharmacy and they will forward the refill request to us. Please allow 3 business days for your refill to be completed.          Additional Information About Your Visit        Care EveryWhere ID     This is your Care EveryWhere ID. This could be used by other organizations to access your [place] medical records  [id number]        Your Vitals Were     Last Period                   10/15/2003            Blood Pressure from Last 3 Encounters:   04/13/17 176/85   03/09/17 160/88   02/09/17 (!) 157/97    Weight from Last 3 Encounters:   04/13/17 104.3 kg (230 lb)   03/09/17 108 kg (238 lb 3.2 oz)   02/09/17 99.8 kg (220 lb)              We Performed the Following     Comprehensive DME        Primary Care Provider Office Phone # Fax #    Lynn Lim [phone]       [place] [address]  [address] [facility]  [address]  [facility] [address]        Thank you!     Thank you for choosing [facility]  for your care. Our goal is always to provide you with excellent care. Hearing back from our patients is one way we can continue to improve our services. Please take a few minutes to complete the written survey that you may receive in the mail after your visit with us. Thank you!             Your Updated Medication List - Protect others around you: Learn how to safely use, store and throw away your medicines at www.disposemymeds.org.          This list is accurate as of: 5/18/17  1:24 PM.  Always use your most recent med list.                   Brand Name Dispense Instructions for use    acetaminophen 325 MG tablet    TYLENOL    100 tablet    Take 2 tablets (650 mg) by mouth every 4 hours as needed for other (surgical pain)       hydrocortisone 0.2 % cream    WESTCORT    45 g    Apply topically 2 times daily       ibuprofen 800 MG tablet    ADVIL/MOTRIN     Take 800 mg by mouth       lisinopril 10 MG tablet    PRINIVIL/ZESTRIL    30 tablet    Take 1 tablet (10 mg) by mouth every morning       multivitamin  with lutein Caps per capsule      Take 1 capsule by mouth daily       order for DME      Equipment ordered: RESMED Auto PAP Mask type: Nasal Settings: 8-16 CM H2O       PREVIDENT 5000 BOOSTER PLUS 1.1 % Pste   Generic drug:  Sodium Fluoride          sertraline 100 MG tablet    ZOLOFT    90 tablet    Take 1 tablet (100 mg) by mouth daily

## 2017-07-17 RX ORDER — CLINDAMYCIN PHOSPHATE 900 MG/50ML
900 INJECTION, SOLUTION INTRAVENOUS SEE ADMIN INSTRUCTIONS
Status: CANCELLED | OUTPATIENT
Start: 2017-07-17

## 2017-07-17 RX ORDER — CLINDAMYCIN PHOSPHATE 900 MG/50ML
900 INJECTION, SOLUTION INTRAVENOUS
Status: CANCELLED | OUTPATIENT
Start: 2017-07-17

## 2017-09-07 ENCOUNTER — TRANSFERRED RECORDS (OUTPATIENT)
Dept: FAMILY MEDICINE | Facility: CLINIC | Age: 67
End: 2017-09-07

## 2017-09-07 ENCOUNTER — OFFICE VISIT (OUTPATIENT)
Dept: FAMILY MEDICINE | Facility: CLINIC | Age: 67
End: 2017-09-07

## 2017-09-07 VITALS
RESPIRATION RATE: 16 BRPM | BODY MASS INDEX: 40.29 KG/M2 | OXYGEN SATURATION: 97 % | HEIGHT: 64 IN | HEART RATE: 69 BPM | WEIGHT: 236 LBS | DIASTOLIC BLOOD PRESSURE: 78 MMHG | TEMPERATURE: 98 F | SYSTOLIC BLOOD PRESSURE: 132 MMHG

## 2017-09-07 DIAGNOSIS — Z23 NEED FOR VACCINATION: ICD-10-CM

## 2017-09-07 DIAGNOSIS — Z71.89 ACP (ADVANCE CARE PLANNING): ICD-10-CM

## 2017-09-07 DIAGNOSIS — Z01.818 PRE-OP EXAM: ICD-10-CM

## 2017-09-07 DIAGNOSIS — M17.12 PRIMARY OSTEOARTHRITIS OF LEFT KNEE: Primary | ICD-10-CM

## 2017-09-07 LAB — HEMOGLOBIN: 14.7 G/DL (ref 11.7–15.7)

## 2017-09-07 PROCEDURE — 90670 PCV13 VACCINE IM: CPT | Performed by: FAMILY MEDICINE

## 2017-09-07 PROCEDURE — 99214 OFFICE O/P EST MOD 30 MIN: CPT | Mod: 25 | Performed by: FAMILY MEDICINE

## 2017-09-07 PROCEDURE — 36415 COLL VENOUS BLD VENIPUNCTURE: CPT | Performed by: FAMILY MEDICINE

## 2017-09-07 PROCEDURE — 90686 IIV4 VACC NO PRSV 0.5 ML IM: CPT | Performed by: FAMILY MEDICINE

## 2017-09-07 PROCEDURE — 90471 IMMUNIZATION ADMIN: CPT | Performed by: FAMILY MEDICINE

## 2017-09-07 PROCEDURE — 85018 HEMOGLOBIN: CPT | Performed by: FAMILY MEDICINE

## 2017-09-07 PROCEDURE — 90472 IMMUNIZATION ADMIN EACH ADD: CPT | Performed by: FAMILY MEDICINE

## 2017-09-07 NOTE — LETTER
[facility]              [facility]  [facility] [address]  [place]  [address]        For Emergencies:  Call 911      For Clinic Appointments:   [phone]                     [facility] LILIAM WISEMAN  [address]  [address]  [facility] [address]  [phone]  Dept: [phone]    PRE-OP EVALUATION:  Today's date: 2017    Mary Crarera (: 1950) presents for pre-operative evaluation assessment as requested by Dr. Conroy.  She requires evaluation and anesthesia risk assessment prior to undergoing surgery/procedure for treatment of Arthritis in Left Knee .  Proposed procedure: Total Knee Replacement    Date of Surgery/ Procedure: 2017  Time of Surgery/ Procedure: 5:00AM  Hospital/Surgical Facility:  [facility]  Fax number for surgical facility: N/A  Primary Physician: Lynn Lim  Type of Anesthesia Anticipated: General    Patient has a Health Care Directive or Living Will:  YES (Copy Requested)    1. NO - Do you have a history of heart attack, stroke, stent, bypass or surgery on an artery in the head, neck, heart or legs?  2. NO - Do you ever have any pain or discomfort in your chest?  3. NO - Do you have a history of  Heart Failure?  4. NO - Are you troubled by shortness of breath when: walking on the level, up a slight hill or at night?  5. NO - Do you currently have a cold, bronchitis or other respiratory infection?  6. NO - Do you have a cough, shortness of breath or wheezing?  7. NO - Do you sometimes get pains in the calves of your legs when you walk?  8. NO - Do you or anyone in your family have previous history of blood clots?  9. NO - Do you or does anyone in your family have a serious bleeding problem such as prolonged bleeding following surgeries or cuts?  10. NO - Have you ever had problems with anemia or been  told to take iron pills?  11. NO - Have you had any abnormal blood loss such as black, tarry or bloody stools, or abnormal vaginal bleeding?  12. NO - Have you ever had a blood transfusion?  13. NO - Have you or any of your relatives ever had problems with anesthesia?  14. NO - Do you have sleep apnea, excessive snoring or daytime drowsiness?  15. NO - Do you have any prosthetic heart valves?  16. NO - Do you have prosthetic joints?  17. NO - Is there any chance that you may be pregnant?        HPI:                                                      Brief HPI related to upcoming procedure: Left knee pain due to arthritis/replacement      See problem list for active medical problems.  Problems all longstanding and stable, except as noted/documented.  See ROS for pertinent symptoms related to these conditions.                                                                                                  .    MEDICAL HISTORY:                                                    Patient Active Problem List    Diagnosis Date Noted     MANNIE (obstructive sleep apnea) 01/25/2017     Priority: Medium     Diagnostic Study  Sleep Study 1/23/2017 - (226.0 lbs) AHI 29.7, RDI 36.7, Supine AHI -, REM AHI 91.6, Low O2 75.3%, Time Spent ?88% 16.9 minutes / Time Spent ?89% 34.8 minutes.       Osteoarthritis of right knee 12/05/2016     Priority: Medium     Health Care Home 09/06/2013     Priority: Medium     State Tier Level:  Tier 1  Status:  n/a  Care Coordinator:  n/a     See Letters for [facility] Care Plan           Depression, major, recurrent, in complete remission (H) 08/16/2013     Priority: Medium     Alopecia areata 08/16/2013     Priority: Medium     Pure hypercholesterolemia 08/17/2007     Priority: Medium     Benign neoplasm of cranial nerve (H) 09/29/2006     Priority: Medium     Problem list name updated by automated process. Provider to review       Hearing loss 09/29/2006     Priority: Medium     Problem list name updated  by automated process. Provider to review       Obesity 01/30/2004     Priority: Medium     Problem list name updated by automated process. Provider to review       Leiomyoma of uterus 01/14/2002     Priority: Medium     Problem list name updated by automated process. Provider to review       ACP (advance care planning) 04/08/2014     Priority: Low       Advance Care Planning 1/20/2016: ACP Review of Chart / Resources Provided:  Reviewed chart for advance care plan.  Mary Carrera has no plan or code status on file. Discussed available resources and provided with information. Confirmed code status reflects current choices pending further ACP discussions.  Confirmed/documented legally designated decision maker(s). Added by Mahnaz Britt  Advance Care Planning 9/7/2017: ACP Review of Chart / Resources Provided:  Reviewed chart for advance care plan.  Mary Carrera has no plan or code status on file however states presence of ACP document. Copy requested.   Added by Joycelyn Thomas                    Past Medical History:   Diagnosis Date     Arthritis      Chronic depressive personality disorder      Hearing loss      Other and unspecified alcohol dependence, in remission      Past Surgical History:   Procedure Laterality Date     ARTHROPLASTY KNEE Right 12/5/2016    Procedure: ARTHROPLASTY KNEE;  Surgeon: Severo Conroy MD;  Location: [facility] OR     C LIGATE FALLOPIAN TUBE  1982    Tubal Ligation     C NONSPECIFIC PROCEDURE  6/96    Acoustic Neuroma   left, dr tran/olivia QUIÑONES REVISION OF CRANIAL NERVE  10/2006    recurrent acoustic neuroma     COLONOSCOPY  2007    WNL     COLONOSCOPY  5/13/2014    Procedure: COLONOSCOPY;  Surgeon: Priyanka Galvan MD;  Location:  GI     ENT SURGERY       HC REMOVAL OF LEG VEINS/ULCER  1988 & 1996     ROTATOR CUFF REPAIR RT/LT  10/2015    right     Current Outpatient Prescriptions   Medication Sig Dispense Refill     sertraline (ZOLOFT) 100 MG tablet Take 1 tablet  "(100 mg) by mouth daily 90 tablet 3     multivitamin  with lutein (OCUVITE WITH LTEIN) CAPS Take 1 capsule by mouth daily       order for DME Equipment ordered: RESMED Auto PAP Mask type: Nasal Settings: 8-16 CM H2O       acetaminophen (TYLENOL) 325 MG tablet Take 2 tablets (650 mg) by mouth every 4 hours as needed for other (surgical pain) 100 tablet 0     ibuprofen (ADVIL,MOTRIN) 800 MG tablet Take 800 mg by mouth       hydrocortisone (WESTCORT) 0.2 % cream Apply topically 2 times daily 45 g 0     PREVIDENT 5000 BOOSTER PLUS 1.1 % PSTE   2     OTC products: None, except as noted above    Allergies   Allergen Reactions     No Known Allergies       Latex Allergy: NO    Social History   Substance Use Topics     Smoking status: Never Smoker     Smokeless tobacco: Never Used     Alcohol use 2.5 oz/week     5 Standard drinks or equivalent per week      Comment: wine     History   Drug Use No       REVIEW OF SYSTEMS:                                                    Constitutional, HEENT, cardiovascular, pulmonary, gi and gu systems are negative, except as otherwise noted.      EXAM:                                                    /78 (BP Location: Right arm, Patient Position: Chair, Cuff Size: Adult Large)  Pulse 69  Temp 98  F (36.7  C) (Oral)  Resp 16  Ht 1.626 m (5' 4\")  Wt 107 kg (236 lb)  LMP 10/15/2003  SpO2 97%  Breastfeeding? No  BMI 40.51 kg/m2    GENERAL APPEARANCE: healthy, alert and no distress     EYES: EOMI, PERRL     HENT: ear canals and TM's normal and nose and mouth without ulcers or lesions     NECK: no adenopathy, no asymmetry, masses, or scars and thyroid normal to palpation     RESP: lungs clear to auscultation - no rales, rhonchi or wheezes     CV: regular rates and rhythm, normal S1 S2, no S3 or S4 and no murmur, click or rub     ABDOMEN:  soft, nontender, no HSM or masses and bowel sounds normal     MS: extremities normal- no gross deformities noted, no evidence of inflammation " in joints, FROM in all extremities.     SKIN: no suspicious lesions or rashes     NEURO: Normal strength and tone, sensory exam grossly normal, mentation intact and speech normal     PSYCH: mentation appears normal. and affect normal/bright     LYMPHATICS: No axillary, cervical, or supraclavicular nodes    DIAGNOSTICS:                                                    HGB: 14.7 gm/dl    Recent Labs   Lab Test  12/07/16   0724  12/06/16   0628  12/05/16   1505   12/03/14   1121  05/07/14 08/16/13   0955   HGB  11.8  12.0   --    < >  14.8   --   14.0  14.1   PLT   --    --    --    --   204   --    --   165   NA   --   146*  144   --    --    < >   --    --    POTASSIUM   --   4.2  4.2   --    --    < >   --    --    CR   --   0.76  0.76   --    --    < >   --    --    A1C   --    --    --    --    --    --   5.5   --     < > = values in this interval not displayed.        IMPRESSION:                                                    Diagnosis/reason for consult: (M17.12) Primary osteoarthritis of left knee  (primary encounter diagnosis)    (Z01.818) Pre-op exam        The proposed surgical procedure is considered INTERMEDIATE risk.    REVISED CARDIAC RISK INDEX  The patient has the following serious cardiovascular risks for perioperative complications such as (MI, PE, VFib and 3  AV Block):  No serious cardiac risks  INTERPRETATION: 1 risks: Class II (low risk - 0.9% complication rate)    The patient has the following additional risks for perioperative complications:  No identified additional risks      ICD-10-CM    1. Need for vaccination Z23 VACCINE ADMINISTRATION, INITIAL     VACCINE ADMINISTRATION, EACH ADDITIONAL     HC FLU VAC PRESRV FREE QUAD SPLIT VIR 3+YRS IM     C MENINGOCOCCAL VACCINE,IM (MENVEO)   2. ACP (advance care planning) Z71.89        RECOMMENDATIONS:                                                      --Consult hospital rounder / IM to assist post-op medical management    --Patient is to take  all scheduled medications on the day of surgery EXCEPT for modifications listed below.    APPROVAL GIVEN to proceed with proposed procedure, without further diagnostic evaluation       Signed Electronically by: Lynn Lim MD    Copy of this evaluation report is provided to requesting physician.

## 2017-09-07 NOTE — MR AVS SNAPSHOT
"              After Visit Summary   9/7/2017    Mary Carrera    MRN: [id number]           Patient Information     Date Of Birth          1950        Visit Information        Provider Department      9/7/2017 12:15 PM Lynn Lim MD [facility]        Today's Diagnoses     Primary osteoarthritis of left knee    -  1    Pre-op exam        Need for vaccination        ACP (advance care planning)          Care Instructions    Take copy to surgery site          Follow-ups after your visit        Follow-up notes from your care team     Return if symptoms worsen or fail to improve.      Your next 10 appointments already scheduled     Sep 12, 2017   Procedure with Severo Conroy MD   [facility], [place], Same Day Surgery (--)    [address] [address] [phone]              Who to contact     If you have questions or need follow up information about today's clinic visit or your schedule please contact [facility] directly at [phone].  Normal or non-critical lab and imaging results will be communicated to you by [employer], letter or phone within 4 business days after the clinic has received the results. If you do not hear from us within 7 days, please contact the clinic through [employer] or phone. If you have a critical or abnormal lab result, we will notify you by phone as soon as possible.  Submit refill requests through [employer] or call your pharmacy and they will forward the refill request to us. Please allow 3 business days for your refill to be completed.          Additional Information About Your Visit        [employer] Information     [employer] lets you send messages to your doctor, view your test results, renew your prescriptions, schedule appointments and more. To sign up, go to [email or web address] . Click on \"Log in\" on the left side of the screen, which will take you to the Welcome page. Then click on \"Sign up Now\" on the right side " "of the page.     You will be asked to enter the access code listed below, as well as some personal information. Please follow the directions to create your username and password.     Your access code is: [email or web address]  Expires: 2017  2:16 PM     Your access code will  in 90 days. If you need help or a new code, please call your [place] [facility] clinic or [phone].        Care EveryWhere ID     This is your Care EveryWhere ID. This could be used by other organizations to access your [place] medical records  [id number]        Your Vitals Were     Pulse Temperature Respirations Height Last Period Pulse Oximetry    69 98  F (36.7  C) (Oral) 16 1.626 m (5' 4\") 10/15/2003 97%    Breastfeeding? BMI (Body Mass Index)                No 40.51 kg/m2           Blood Pressure from Last 3 Encounters:   17 132/78   17 176/85   17 160/88    Weight from Last 3 Encounters:   17 107 kg (236 lb)   17 104.3 kg (230 lb)   17 108 kg (238 lb 3.2 oz)              We Performed the Following     CL AFF HEMOGLOBIN (BFP)     HC FLU VAC PRESRV FREE QUAD SPLIT VIR 3+YRS IM     PNEUMOCOCCAL CONJ VACCINE 13 VALENT IM     VACCINE ADMINISTRATION, EACH ADDITIONAL     VACCINE ADMINISTRATION, INITIAL     VENOUS COLLECTION        Primary Care Provider Office Phone # Fax #    Lynn Lim [phone] [address] [address]        Equal Access to Services     [facility]: Hadii tiffanie weathers hadasho Sopamali, waaxda luqadaha, qaybta kaalmada adeegyada, keren vo . So [facility] [phone].    ATENCIÓN: Si habla español, tiene a lehman disposición servicios gratuitos de asistencia lingüística. Llame al [phone].    We comply with applicable federal civil rights laws and [place] laws. We do not discriminate on the basis of race, color, national origin, age, disability sex, sexual orientation or gender identity.            Thank you!     " Thank you for choosing [facility]  for your care. Our goal is always to provide you with excellent care. Hearing back from our patients is one way we can continue to improve our services. Please take a few minutes to complete the written survey that you may receive in the mail after your visit with us. Thank you!             Your Updated Medication List - Protect others around you: Learn how to safely use, store and throw away your medicines at www.disposemymeds.org.          This list is accurate as of: 9/7/17  2:16 PM.  Always use your most recent med list.                   Brand Name Dispense Instructions for use Diagnosis    acetaminophen 325 MG tablet    TYLENOL    100 tablet    Take 2 tablets (650 mg) by mouth every 4 hours as needed for other (surgical pain)    Primary osteoarthritis of right knee       hydrocortisone 0.2 % cream    WESTCORT    45 g    Apply topically 2 times daily    Other eczema       ibuprofen 800 MG tablet    ADVIL/MOTRIN     Take 800 mg by mouth        multivitamin  with lutein Caps per capsule      Take 1 capsule by mouth daily    Primary osteoarthritis of right knee, Pre-op exam       order for DME      Equipment ordered: RESMED Auto PAP Mask type: Nasal Settings: 8-16 CM H2O        PREVIDENT 5000 BOOSTER PLUS 1.1 % Pste   Generic drug:  Sodium Fluoride           sertraline 100 MG tablet    ZOLOFT    90 tablet    Take 1 tablet (100 mg) by mouth daily    Depression, major, recurrent, in complete remission (H)

## 2017-09-07 NOTE — PROGRESS NOTES
[facility]  [address]  [address]  [facility] [address]  [phone]  Dept: [phone]    PRE-OP EVALUATION:  Today's date: 2017    Mary Carrera (: 1950) presents for pre-operative evaluation assessment as requested by Dr. Conroy.  She requires evaluation and anesthesia risk assessment prior to undergoing surgery/procedure for treatment of Arthritis in Left Knee .  Proposed procedure: Total Knee Replacement    Date of Surgery/ Procedure: 2017  Time of Surgery/ Procedure: 5:00AM  Hospital/Surgical Facility:  [facility]  Fax number for surgical facility: N/A  Primary Physician: Lynn Lim  Type of Anesthesia Anticipated: General    Patient has a Health Care Directive or Living Will:  YES (Copy Requested)    1. NO - Do you have a history of heart attack, stroke, stent, bypass or surgery on an artery in the head, neck, heart or legs?  2. NO - Do you ever have any pain or discomfort in your chest?  3. NO - Do you have a history of  Heart Failure?  4. Yes - Are you troubled by shortness of breath when: walking on the level, up a slight hill or at night? Sleep apnea  5. NO - Do you currently have a cold, bronchitis or other respiratory infection?  6. NO - Do you have a cough, shortness of breath or wheezing?  7. NO - Do you sometimes get pains in the calves of your legs when you walk?  8. NO - Do you or anyone in your family have previous history of blood clots?  9. NO - Do you or does anyone in your family have a serious bleeding problem such as prolonged bleeding following surgeries or cuts?  10. NO - Have you ever had problems with anemia or been told to take iron pills?  11. NO - Have you had any abnormal blood loss such as black, tarry or bloody stools, or abnormal vaginal bleeding?  12. NO - Have you ever had a blood transfusion?  13. NO - Have you or any of your relatives ever had problems with anesthesia?  14. NO - Do you have  sleep apnea, excessive snoring or daytime drowsiness?  15. NO - Do you have any prosthetic heart valves?  16. NO - Do you have prosthetic joints?  17. NO - Is there any chance that you may be pregnant?        HPI:                                                      Brief HPI related to upcoming procedure: Left knee pain due to arthritis/replacement      See problem list for active medical problems.  Problems all longstanding and stable, except as noted/documented.  See ROS for pertinent symptoms related to these conditions.                                                                                                  .    MEDICAL HISTORY:                                                    Patient Active Problem List    Diagnosis Date Noted     MANNIE (obstructive sleep apnea) 01/25/2017     Priority: Medium     Diagnostic Study  Sleep Study 1/23/2017 - (226.0 lbs) AHI 29.7, RDI 36.7, Supine AHI -, REM AHI 91.6, Low O2 75.3%, Time Spent ?88% 16.9 minutes / Time Spent ?89% 34.8 minutes.       Osteoarthritis of right knee 12/05/2016     Priority: Medium     Health Care Home 09/06/2013     Priority: Medium     State Tier Level:  Tier 1  Status:  n/a  Care Coordinator:  n/a     See Letters for [facility] Care Plan           Depression, major, recurrent, in complete remission (H) 08/16/2013     Priority: Medium     Alopecia areata 08/16/2013     Priority: Medium     Pure hypercholesterolemia 08/17/2007     Priority: Medium     Benign neoplasm of cranial nerve (H) 09/29/2006     Priority: Medium     Problem list name updated by automated process. Provider to review       Hearing loss 09/29/2006     Priority: Medium     Problem list name updated by automated process. Provider to review       Obesity 01/30/2004     Priority: Medium     Problem list name updated by automated process. Provider to review       Leiomyoma of uterus 01/14/2002     Priority: Medium     Problem list name updated by automated process. Provider to review        ACP (advance care planning) 04/08/2014     Priority: Low       Advance Care Planning 1/20/2016: ACP Review of Chart / Resources Provided:  Reviewed chart for advance care plan.  Mary Carrera has no plan or code status on file. Discussed available resources and provided with information. Confirmed code status reflects current choices pending further ACP discussions.  Confirmed/documented legally designated decision maker(s). Added by Mahnaz Britt  Advance Care Planning 9/7/2017: ACP Review of Chart / Resources Provided:  Reviewed chart for advance care plan.  Mary Carrera has no plan or code status on file however states presence of ACP document. Copy requested.   Added by Joycelyn Thomas                    Past Medical History:   Diagnosis Date     Arthritis      Chronic depressive personality disorder      Hearing loss      Other and unspecified alcohol dependence, in remission      Past Surgical History:   Procedure Laterality Date     ARTHROPLASTY KNEE Right 12/5/2016    Procedure: ARTHROPLASTY KNEE;  Surgeon: Severo Conroy MD;  Location: [facility]     C LIGATE FALLOPIAN TUBE  1982    Tubal Ligation     C NONSPECIFIC PROCEDURE  6/96    Acoustic Neuroma   left, dr tran/olivia QUIÑONES REVISION OF CRANIAL NERVE  10/2006    recurrent acoustic neuroma     COLONOSCOPY  2007    WNL     COLONOSCOPY  5/13/2014    Procedure: COLONOSCOPY;  Surgeon: Priyanka Galvan MD;  Location:  GI     ENT SURGERY       HC REMOVAL OF LEG VEINS/ULCER  1988 & 1996     ROTATOR CUFF REPAIR RT/LT  10/2015    right     Current Outpatient Prescriptions   Medication Sig Dispense Refill     sertraline (ZOLOFT) 100 MG tablet Take 1 tablet (100 mg) by mouth daily 90 tablet 3     multivitamin  with lutein (OCUVITE WITH LTEIN) CAPS Take 1 capsule by mouth daily       order for DME Equipment ordered: RESMED Auto PAP Mask type: Nasal Settings: 8-16 CM H2O       acetaminophen (TYLENOL) 325 MG tablet Take 2 tablets (650 mg) by  "mouth every 4 hours as needed for other (surgical pain) 100 tablet 0     ibuprofen (ADVIL,MOTRIN) 800 MG tablet Take 800 mg by mouth       hydrocortisone (WESTCORT) 0.2 % cream Apply topically 2 times daily 45 g 0     PREVIDENT 5000 BOOSTER PLUS 1.1 % PSTE   2     OTC products: None, except as noted above    Allergies   Allergen Reactions     No Known Allergies       Latex Allergy: NO    Social History   Substance Use Topics     Smoking status: Never Smoker     Smokeless tobacco: Never Used     Alcohol use 2.5 oz/week     5 Standard drinks or equivalent per week      Comment: wine     History   Drug Use No       REVIEW OF SYSTEMS:                                                    Constitutional, HEENT, cardiovascular, pulmonary, gi and gu systems are negative, except as otherwise noted.      EXAM:                                                    /78 (BP Location: Right arm, Patient Position: Chair, Cuff Size: Adult Large)  Pulse 69  Temp 98  F (36.7  C) (Oral)  Resp 16  Ht 1.626 m (5' 4\")  Wt 107 kg (236 lb)  LMP 10/15/2003  SpO2 97%  Breastfeeding? No  BMI 40.51 kg/m2    GENERAL APPEARANCE: healthy, alert and no distress     EYES: EOMI, PERRL     HENT: ear canals and TM's normal and nose and mouth without ulcers or lesions     NECK: no adenopathy, no asymmetry, masses, or scars and thyroid normal to palpation     RESP: lungs clear to auscultation - no rales, rhonchi or wheezes     CV: regular rates and rhythm, normal S1 S2, no S3 or S4 and no murmur, click or rub     ABDOMEN:  soft, nontender, no HSM or masses and bowel sounds normal     MS: extremities normal- no gross deformities noted, no evidence of inflammation in joints, FROM in all extremities.     SKIN: no suspicious lesions or rashes     NEURO: Normal strength and tone, sensory exam grossly normal, mentation intact and speech normal     PSYCH: mentation appears normal. and affect normal/bright     LYMPHATICS: No axillary, cervical, or " supraclavicular nodes    DIAGNOSTICS:                                                    HGB: 14.7 gm/dl    Recent Labs   Lab Test  12/07/16   0724  12/06/16   0628  12/05/16   1505   12/03/14   1121  05/07/14 08/16/13   0955   HGB  11.8  12.0   --    < >  14.8   --   14.0  14.1   PLT   --    --    --    --   204   --    --   165   NA   --   146*  144   --    --    < >   --    --    POTASSIUM   --   4.2  4.2   --    --    < >   --    --    CR   --   0.76  0.76   --    --    < >   --    --    A1C   --    --    --    --    --    --   5.5   --     < > = values in this interval not displayed.        IMPRESSION:                                                    Diagnosis/reason for consult: (M17.12) Primary osteoarthritis of left knee  (primary encounter diagnosis)    (Z01.818) Pre-op exam        The proposed surgical procedure is considered INTERMEDIATE risk.    REVISED CARDIAC RISK INDEX  The patient has the following serious cardiovascular risks for perioperative complications such as (MI, PE, VFib and 3  AV Block):  No serious cardiac risks  INTERPRETATION: 1 risks: Class II (low risk - 0.9% complication rate)    The patient has the following additional risks for perioperative complications:  No identified additional risks      ICD-10-CM    1. Need for vaccination Z23 VACCINE ADMINISTRATION, INITIAL     VACCINE ADMINISTRATION, EACH ADDITIONAL     HC FLU VAC PRESRV FREE QUAD SPLIT VIR 3+YRS IM     C MENINGOCOCCAL VACCINE,IM (MENVEO)   2. ACP (advance care planning) Z71.89        RECOMMENDATIONS:                                                      --Consult hospital rounder / IM to assist post-op medical management    --Patient is to take all scheduled medications on the day of surgery EXCEPT for modifications listed below.    APPROVAL GIVEN to proceed with proposed procedure, without further diagnostic evaluation       Signed Electronically by: Lynn Lim MD    Copy of this evaluation report is provided to  requesting physician.

## 2017-09-11 ENCOUNTER — ANESTHESIA EVENT (OUTPATIENT)
Dept: SURGERY | Facility: CLINIC | Age: 67
DRG: 470 | End: 2017-09-11
Payer: COMMERCIAL

## 2017-09-12 ENCOUNTER — HOSPITAL ENCOUNTER (INPATIENT)
Facility: CLINIC | Age: 67
LOS: 1 days | Discharge: HOME | DRG: 470 | End: 2017-09-13
Attending: ORTHOPAEDIC SURGERY | Admitting: ORTHOPAEDIC SURGERY
Payer: COMMERCIAL

## 2017-09-12 ENCOUNTER — ANESTHESIA (OUTPATIENT)
Dept: SURGERY | Facility: CLINIC | Age: 67
DRG: 470 | End: 2017-09-12
Payer: COMMERCIAL

## 2017-09-12 ENCOUNTER — APPOINTMENT (OUTPATIENT)
Dept: GENERAL RADIOLOGY | Facility: CLINIC | Age: 67
DRG: 470 | End: 2017-09-12
Attending: ORTHOPAEDIC SURGERY
Payer: COMMERCIAL

## 2017-09-12 ENCOUNTER — APPOINTMENT (OUTPATIENT)
Dept: PHYSICAL THERAPY | Facility: CLINIC | Age: 67
DRG: 470 | End: 2017-09-12
Attending: ORTHOPAEDIC SURGERY
Payer: COMMERCIAL

## 2017-09-12 DIAGNOSIS — Z96.652 S/P TOTAL KNEE ARTHROPLASTY, LEFT: Primary | ICD-10-CM

## 2017-09-12 LAB
ABO + RH BLD: NORMAL
ABO + RH BLD: NORMAL
BLD GP AB SCN SERPL QL: NORMAL
BLOOD BANK CMNT PATIENT-IMP: NORMAL
SPECIMEN EXP DATE BLD: NORMAL

## 2017-09-12 PROCEDURE — 86901 BLOOD TYPING SEROLOGIC RH(D): CPT | Performed by: ORTHOPAEDIC SURGERY

## 2017-09-12 PROCEDURE — C1776 JOINT DEVICE (IMPLANTABLE): HCPCS | Performed by: ORTHOPAEDIC SURGERY

## 2017-09-12 PROCEDURE — 25000128 H RX IP 250 OP 636: Performed by: ORTHOPAEDIC SURGERY

## 2017-09-12 PROCEDURE — 40000986 XR KNEE PORT LT 1/2 VW: Mod: LT

## 2017-09-12 PROCEDURE — 97116 GAIT TRAINING THERAPY: CPT | Mod: GP

## 2017-09-12 PROCEDURE — 25000132 ZZH RX MED GY IP 250 OP 250 PS 637: Performed by: ORTHOPAEDIC SURGERY

## 2017-09-12 PROCEDURE — 40000193 ZZH STATISTIC PT WARD VISIT

## 2017-09-12 PROCEDURE — 27210995 ZZH RX 272: Performed by: ORTHOPAEDIC SURGERY

## 2017-09-12 PROCEDURE — 36000062 ZZH SURGERY LEVEL 4 1ST 30 MIN - UMMC: Performed by: ORTHOPAEDIC SURGERY

## 2017-09-12 PROCEDURE — 25000566 ZZH SEVOFLURANE, EA 15 MIN: Performed by: ORTHOPAEDIC SURGERY

## 2017-09-12 PROCEDURE — 86850 RBC ANTIBODY SCREEN: CPT | Performed by: ORTHOPAEDIC SURGERY

## 2017-09-12 PROCEDURE — C1713 ANCHOR/SCREW BN/BN,TIS/BN: HCPCS | Performed by: ORTHOPAEDIC SURGERY

## 2017-09-12 PROCEDURE — 25000125 ZZHC RX 250: Performed by: NURSE ANESTHETIST, CERTIFIED REGISTERED

## 2017-09-12 PROCEDURE — 71000015 ZZH RECOVERY PHASE 1 LEVEL 2 EA ADDTL HR: Performed by: ORTHOPAEDIC SURGERY

## 2017-09-12 PROCEDURE — 0SRD0J9 REPLACEMENT OF LEFT KNEE JOINT WITH SYNTHETIC SUBSTITUTE, CEMENTED, OPEN APPROACH: ICD-10-PCS | Performed by: ORTHOPAEDIC SURGERY

## 2017-09-12 PROCEDURE — 12000001 ZZH R&B MED SURG/OB UMMC

## 2017-09-12 PROCEDURE — 71000014 ZZH RECOVERY PHASE 1 LEVEL 2 FIRST HR: Performed by: ORTHOPAEDIC SURGERY

## 2017-09-12 PROCEDURE — C9399 UNCLASSIFIED DRUGS OR BIOLOG: HCPCS | Performed by: NURSE ANESTHETIST, CERTIFIED REGISTERED

## 2017-09-12 PROCEDURE — 97161 PT EVAL LOW COMPLEX 20 MIN: CPT | Mod: GP

## 2017-09-12 PROCEDURE — 36415 COLL VENOUS BLD VENIPUNCTURE: CPT | Performed by: ORTHOPAEDIC SURGERY

## 2017-09-12 PROCEDURE — 25000125 ZZHC RX 250: Performed by: ORTHOPAEDIC SURGERY

## 2017-09-12 PROCEDURE — 99207 ZZC CONSULT E&M CHANGED TO INITIAL LEVEL: CPT | Performed by: INTERNAL MEDICINE

## 2017-09-12 PROCEDURE — 25800025 ZZH RX 258: Performed by: ORTHOPAEDIC SURGERY

## 2017-09-12 PROCEDURE — 25000128 H RX IP 250 OP 636: Performed by: NURSE ANESTHETIST, CERTIFIED REGISTERED

## 2017-09-12 PROCEDURE — 99221 1ST HOSP IP/OBS SF/LOW 40: CPT | Performed by: INTERNAL MEDICINE

## 2017-09-12 PROCEDURE — 25000132 ZZH RX MED GY IP 250 OP 250 PS 637: Performed by: INTERNAL MEDICINE

## 2017-09-12 PROCEDURE — 27210794 ZZH OR GENERAL SUPPLY STERILE: Performed by: ORTHOPAEDIC SURGERY

## 2017-09-12 PROCEDURE — 97110 THERAPEUTIC EXERCISES: CPT | Mod: GP

## 2017-09-12 PROCEDURE — 36000064 ZZH SURGERY LEVEL 4 EA 15 ADDTL MIN - UMMC: Performed by: ORTHOPAEDIC SURGERY

## 2017-09-12 PROCEDURE — 37000008 ZZH ANESTHESIA TECHNICAL FEE, 1ST 30 MIN: Performed by: ORTHOPAEDIC SURGERY

## 2017-09-12 PROCEDURE — 37000009 ZZH ANESTHESIA TECHNICAL FEE, EACH ADDTL 15 MIN: Performed by: ORTHOPAEDIC SURGERY

## 2017-09-12 PROCEDURE — 86900 BLOOD TYPING SEROLOGIC ABO: CPT | Performed by: ORTHOPAEDIC SURGERY

## 2017-09-12 PROCEDURE — 27810169 ZZH OR IMPLANT GENERAL: Performed by: ORTHOPAEDIC SURGERY

## 2017-09-12 PROCEDURE — 40000170 ZZH STATISTIC PRE-PROCEDURE ASSESSMENT II: Performed by: ORTHOPAEDIC SURGERY

## 2017-09-12 PROCEDURE — 97530 THERAPEUTIC ACTIVITIES: CPT | Mod: GP

## 2017-09-12 DEVICE — IMP PATELLA ZIM KNEE ALL POLLY 35MM 42-5400-000-35: Type: IMPLANTABLE DEVICE | Site: KNEE | Status: FUNCTIONAL

## 2017-09-12 DEVICE — IMPLANTABLE DEVICE: Type: IMPLANTABLE DEVICE | Site: KNEE | Status: FUNCTIONAL

## 2017-09-12 DEVICE — IMP TIBIAL ZIM PSN NP STM 5DEG SZ EL 42-5320-071-01: Type: IMPLANTABLE DEVICE | Site: KNEE | Status: FUNCTIONAL

## 2017-09-12 DEVICE — BONE CEMENT SIMPLEX W/TOBRAMYCIN 6197-9-001: Type: IMPLANTABLE DEVICE | Site: KNEE | Status: FUNCTIONAL

## 2017-09-12 RX ORDER — CEFAZOLIN SODIUM 2 G/100ML
2 INJECTION, SOLUTION INTRAVENOUS EVERY 8 HOURS
Status: COMPLETED | OUTPATIENT
Start: 2017-09-12 | End: 2017-09-13

## 2017-09-12 RX ORDER — ASPIRIN 81 MG/1
81 TABLET ORAL 2 TIMES DAILY
Status: DISCONTINUED | OUTPATIENT
Start: 2017-09-12 | End: 2017-09-13 | Stop reason: HOSPADM

## 2017-09-12 RX ORDER — LIDOCAINE HYDROCHLORIDE 20 MG/ML
INJECTION, SOLUTION INFILTRATION; PERINEURAL PRN
Status: DISCONTINUED | OUTPATIENT
Start: 2017-09-12 | End: 2017-09-12

## 2017-09-12 RX ORDER — OXYCODONE HYDROCHLORIDE 5 MG/1
5-10 TABLET ORAL EVERY 4 HOURS PRN
Status: DISCONTINUED | OUTPATIENT
Start: 2017-09-12 | End: 2017-09-12

## 2017-09-12 RX ORDER — ONDANSETRON 2 MG/ML
4 INJECTION INTRAMUSCULAR; INTRAVENOUS EVERY 30 MIN PRN
Status: DISCONTINUED | OUTPATIENT
Start: 2017-09-12 | End: 2017-09-12 | Stop reason: HOSPADM

## 2017-09-12 RX ORDER — PROPOFOL 10 MG/ML
INJECTION, EMULSION INTRAVENOUS PRN
Status: DISCONTINUED | OUTPATIENT
Start: 2017-09-12 | End: 2017-09-12

## 2017-09-12 RX ORDER — PROPOFOL 10 MG/ML
INJECTION, EMULSION INTRAVENOUS CONTINUOUS PRN
Status: DISCONTINUED | OUTPATIENT
Start: 2017-09-12 | End: 2017-09-12

## 2017-09-12 RX ORDER — MAGNESIUM HYDROXIDE 1200 MG/15ML
LIQUID ORAL PRN
Status: DISCONTINUED | OUTPATIENT
Start: 2017-09-12 | End: 2017-09-12 | Stop reason: HOSPADM

## 2017-09-12 RX ORDER — ONDANSETRON 2 MG/ML
INJECTION INTRAMUSCULAR; INTRAVENOUS PRN
Status: DISCONTINUED | OUTPATIENT
Start: 2017-09-12 | End: 2017-09-12

## 2017-09-12 RX ORDER — LABETALOL HYDROCHLORIDE 5 MG/ML
INJECTION, SOLUTION INTRAVENOUS PRN
Status: DISCONTINUED | OUTPATIENT
Start: 2017-09-12 | End: 2017-09-12

## 2017-09-12 RX ORDER — SODIUM CHLORIDE, SODIUM LACTATE, POTASSIUM CHLORIDE, CALCIUM CHLORIDE 600; 310; 30; 20 MG/100ML; MG/100ML; MG/100ML; MG/100ML
INJECTION, SOLUTION INTRAVENOUS CONTINUOUS
Status: DISCONTINUED | OUTPATIENT
Start: 2017-09-12 | End: 2017-09-12 | Stop reason: HOSPADM

## 2017-09-12 RX ORDER — LIDOCAINE 40 MG/G
CREAM TOPICAL
Status: DISCONTINUED | OUTPATIENT
Start: 2017-09-12 | End: 2017-09-13 | Stop reason: HOSPADM

## 2017-09-12 RX ORDER — ONDANSETRON 2 MG/ML
4 INJECTION INTRAMUSCULAR; INTRAVENOUS EVERY 6 HOURS PRN
Status: DISCONTINUED | OUTPATIENT
Start: 2017-09-12 | End: 2017-09-13 | Stop reason: HOSPADM

## 2017-09-12 RX ORDER — ACETAMINOPHEN 325 MG/1
975 TABLET ORAL EVERY 8 HOURS
Status: DISCONTINUED | OUTPATIENT
Start: 2017-09-12 | End: 2017-09-13 | Stop reason: HOSPADM

## 2017-09-12 RX ORDER — ONDANSETRON 4 MG/1
4 TABLET, ORALLY DISINTEGRATING ORAL EVERY 30 MIN PRN
Status: DISCONTINUED | OUTPATIENT
Start: 2017-09-12 | End: 2017-09-12 | Stop reason: HOSPADM

## 2017-09-12 RX ORDER — SODIUM CHLORIDE, SODIUM LACTATE, POTASSIUM CHLORIDE, CALCIUM CHLORIDE 600; 310; 30; 20 MG/100ML; MG/100ML; MG/100ML; MG/100ML
INJECTION, SOLUTION INTRAVENOUS CONTINUOUS PRN
Status: DISCONTINUED | OUTPATIENT
Start: 2017-09-12 | End: 2017-09-12

## 2017-09-12 RX ORDER — HYDROXYZINE HYDROCHLORIDE 10 MG/1
10 TABLET, FILM COATED ORAL EVERY 6 HOURS PRN
Status: DISCONTINUED | OUTPATIENT
Start: 2017-09-12 | End: 2017-09-13 | Stop reason: HOSPADM

## 2017-09-12 RX ORDER — NALOXONE HYDROCHLORIDE 0.4 MG/ML
.1-.4 INJECTION, SOLUTION INTRAMUSCULAR; INTRAVENOUS; SUBCUTANEOUS
Status: DISCONTINUED | OUTPATIENT
Start: 2017-09-12 | End: 2017-09-13 | Stop reason: HOSPADM

## 2017-09-12 RX ORDER — HYDROMORPHONE HYDROCHLORIDE 1 MG/ML
.3-.5 INJECTION, SOLUTION INTRAMUSCULAR; INTRAVENOUS; SUBCUTANEOUS
Status: DISCONTINUED | OUTPATIENT
Start: 2017-09-12 | End: 2017-09-13 | Stop reason: HOSPADM

## 2017-09-12 RX ORDER — EPHEDRINE SULFATE 50 MG/ML
INJECTION, SOLUTION INTRAMUSCULAR; INTRAVENOUS; SUBCUTANEOUS PRN
Status: DISCONTINUED | OUTPATIENT
Start: 2017-09-12 | End: 2017-09-12

## 2017-09-12 RX ORDER — FENTANYL CITRATE 50 UG/ML
25-50 INJECTION, SOLUTION INTRAMUSCULAR; INTRAVENOUS
Status: DISCONTINUED | OUTPATIENT
Start: 2017-09-12 | End: 2017-09-12 | Stop reason: HOSPADM

## 2017-09-12 RX ORDER — ACETAMINOPHEN 325 MG/1
650 TABLET ORAL EVERY 4 HOURS PRN
Status: DISCONTINUED | OUTPATIENT
Start: 2017-09-15 | End: 2017-09-13 | Stop reason: HOSPADM

## 2017-09-12 RX ORDER — DEXAMETHASONE SODIUM PHOSPHATE 4 MG/ML
INJECTION, SOLUTION INTRA-ARTICULAR; INTRALESIONAL; INTRAMUSCULAR; INTRAVENOUS; SOFT TISSUE PRN
Status: DISCONTINUED | OUTPATIENT
Start: 2017-09-12 | End: 2017-09-12

## 2017-09-12 RX ORDER — CELECOXIB 200 MG/1
400 CAPSULE ORAL DAILY
Status: DISCONTINUED | OUTPATIENT
Start: 2017-09-12 | End: 2017-09-12 | Stop reason: HOSPADM

## 2017-09-12 RX ORDER — ONDANSETRON 4 MG/1
4 TABLET, ORALLY DISINTEGRATING ORAL EVERY 6 HOURS PRN
Status: DISCONTINUED | OUTPATIENT
Start: 2017-09-12 | End: 2017-09-13 | Stop reason: HOSPADM

## 2017-09-12 RX ORDER — CEFAZOLIN SODIUM 2 G/100ML
2 INJECTION, SOLUTION INTRAVENOUS
Status: COMPLETED | OUTPATIENT
Start: 2017-09-12 | End: 2017-09-12

## 2017-09-12 RX ORDER — AMOXICILLIN 250 MG
1-2 CAPSULE ORAL 2 TIMES DAILY
Status: DISCONTINUED | OUTPATIENT
Start: 2017-09-12 | End: 2017-09-13 | Stop reason: HOSPADM

## 2017-09-12 RX ORDER — SODIUM CHLORIDE, SODIUM LACTATE, POTASSIUM CHLORIDE, CALCIUM CHLORIDE 600; 310; 30; 20 MG/100ML; MG/100ML; MG/100ML; MG/100ML
INJECTION, SOLUTION INTRAVENOUS CONTINUOUS
Status: DISCONTINUED | OUTPATIENT
Start: 2017-09-12 | End: 2017-09-13 | Stop reason: HOSPADM

## 2017-09-12 RX ORDER — ACETAMINOPHEN 500 MG
1000 TABLET ORAL ONCE
Status: COMPLETED | OUTPATIENT
Start: 2017-09-12 | End: 2017-09-12

## 2017-09-12 RX ORDER — OXYCODONE HYDROCHLORIDE 5 MG/1
5-10 TABLET ORAL
Status: DISCONTINUED | OUTPATIENT
Start: 2017-09-12 | End: 2017-09-13 | Stop reason: HOSPADM

## 2017-09-12 RX ORDER — LABETALOL HYDROCHLORIDE 5 MG/ML
10 INJECTION, SOLUTION INTRAVENOUS
Status: DISCONTINUED | OUTPATIENT
Start: 2017-09-12 | End: 2017-09-12 | Stop reason: HOSPADM

## 2017-09-12 RX ORDER — FENTANYL CITRATE 50 UG/ML
INJECTION, SOLUTION INTRAMUSCULAR; INTRAVENOUS PRN
Status: DISCONTINUED | OUTPATIENT
Start: 2017-09-12 | End: 2017-09-12

## 2017-09-12 RX ORDER — METOCLOPRAMIDE HYDROCHLORIDE 5 MG/ML
5 INJECTION INTRAMUSCULAR; INTRAVENOUS EVERY 6 HOURS PRN
Status: DISCONTINUED | OUTPATIENT
Start: 2017-09-12 | End: 2017-09-13 | Stop reason: HOSPADM

## 2017-09-12 RX ORDER — CEFAZOLIN SODIUM 1 G/3ML
1 INJECTION, POWDER, FOR SOLUTION INTRAMUSCULAR; INTRAVENOUS SEE ADMIN INSTRUCTIONS
Status: DISCONTINUED | OUTPATIENT
Start: 2017-09-12 | End: 2017-09-12 | Stop reason: HOSPADM

## 2017-09-12 RX ORDER — SERTRALINE HYDROCHLORIDE 100 MG/1
100 TABLET, FILM COATED ORAL DAILY
Status: DISCONTINUED | OUTPATIENT
Start: 2017-09-13 | End: 2017-09-13 | Stop reason: HOSPADM

## 2017-09-12 RX ORDER — METOCLOPRAMIDE 5 MG/1
5 TABLET ORAL EVERY 6 HOURS PRN
Status: DISCONTINUED | OUTPATIENT
Start: 2017-09-12 | End: 2017-09-13 | Stop reason: HOSPADM

## 2017-09-12 RX ORDER — PROCHLORPERAZINE MALEATE 5 MG
5 TABLET ORAL EVERY 6 HOURS PRN
Status: DISCONTINUED | OUTPATIENT
Start: 2017-09-12 | End: 2017-09-13 | Stop reason: HOSPADM

## 2017-09-12 RX ORDER — TRAMADOL HYDROCHLORIDE 50 MG/1
50 TABLET ORAL ONCE
Status: COMPLETED | OUTPATIENT
Start: 2017-09-12 | End: 2017-09-12

## 2017-09-12 RX ADMIN — ACETAMINOPHEN 975 MG: 325 TABLET, FILM COATED ORAL at 13:48

## 2017-09-12 RX ADMIN — HYDROMORPHONE HYDROCHLORIDE 0.5 MG: 1 INJECTION, SOLUTION INTRAMUSCULAR; INTRAVENOUS; SUBCUTANEOUS at 10:51

## 2017-09-12 RX ADMIN — ONDANSETRON 4 MG: 2 INJECTION INTRAMUSCULAR; INTRAVENOUS at 10:53

## 2017-09-12 RX ADMIN — Medication 7.5 MG: at 09:31

## 2017-09-12 RX ADMIN — PROPOFOL 150 MG: 10 INJECTION, EMULSION INTRAVENOUS at 09:11

## 2017-09-12 RX ADMIN — PROPOFOL 50 MG: 10 INJECTION, EMULSION INTRAVENOUS at 09:39

## 2017-09-12 RX ADMIN — LABETALOL HYDROCHLORIDE 10 MG: 5 INJECTION, SOLUTION INTRAVENOUS at 09:44

## 2017-09-12 RX ADMIN — Medication 50 MG: at 09:11

## 2017-09-12 RX ADMIN — OXYCODONE HYDROCHLORIDE 5 MG: 5 TABLET ORAL at 17:56

## 2017-09-12 RX ADMIN — FENTANYL CITRATE 100 MCG: 50 INJECTION, SOLUTION INTRAMUSCULAR; INTRAVENOUS at 09:09

## 2017-09-12 RX ADMIN — DEXAMETHASONE SODIUM PHOSPHATE 6 MG: 4 INJECTION, SOLUTION INTRAMUSCULAR; INTRAVENOUS at 09:24

## 2017-09-12 RX ADMIN — OXYCODONE HYDROCHLORIDE 5 MG: 5 TABLET ORAL at 14:35

## 2017-09-12 RX ADMIN — OXYCODONE HYDROCHLORIDE 5 MG: 5 TABLET ORAL at 21:39

## 2017-09-12 RX ADMIN — SENNOSIDES AND DOCUSATE SODIUM 2 TABLET: 8.6; 5 TABLET ORAL at 19:36

## 2017-09-12 RX ADMIN — CELECOXIB 400 MG: 200 CAPSULE ORAL at 08:14

## 2017-09-12 RX ADMIN — ASPIRIN 81 MG: 81 TABLET, COATED ORAL at 19:36

## 2017-09-12 RX ADMIN — CEFAZOLIN SODIUM 2 G: 2 INJECTION, SOLUTION INTRAVENOUS at 19:36

## 2017-09-12 RX ADMIN — Medication 7.5 MG: at 09:27

## 2017-09-12 RX ADMIN — PROPOFOL 50 MCG/KG/MIN: 10 INJECTION, EMULSION INTRAVENOUS at 09:21

## 2017-09-12 RX ADMIN — SODIUM CHLORIDE 1 G: 9 INJECTION, SOLUTION INTRAVENOUS at 10:44

## 2017-09-12 RX ADMIN — SODIUM CHLORIDE 1 G: 9 INJECTION, SOLUTION INTRAVENOUS at 09:17

## 2017-09-12 RX ADMIN — CEFAZOLIN SODIUM 1 G: 2 INJECTION, SOLUTION INTRAVENOUS at 11:00

## 2017-09-12 RX ADMIN — TRAMADOL HYDROCHLORIDE 50 MG: 50 TABLET, COATED ORAL at 08:14

## 2017-09-12 RX ADMIN — SODIUM CHLORIDE, POTASSIUM CHLORIDE, SODIUM LACTATE AND CALCIUM CHLORIDE: 600; 310; 30; 20 INJECTION, SOLUTION INTRAVENOUS at 08:30

## 2017-09-12 RX ADMIN — SUGAMMADEX 200 MG: 100 INJECTION, SOLUTION INTRAVENOUS at 11:13

## 2017-09-12 RX ADMIN — FENTANYL CITRATE 50 MCG: 50 INJECTION, SOLUTION INTRAMUSCULAR; INTRAVENOUS at 09:36

## 2017-09-12 RX ADMIN — MIDAZOLAM HYDROCHLORIDE 2 MG: 1 INJECTION, SOLUTION INTRAMUSCULAR; INTRAVENOUS at 08:57

## 2017-09-12 RX ADMIN — FENTANYL CITRATE 50 MCG: 50 INJECTION, SOLUTION INTRAMUSCULAR; INTRAVENOUS at 09:41

## 2017-09-12 RX ADMIN — CEFAZOLIN SODIUM 2 G: 2 INJECTION, SOLUTION INTRAVENOUS at 09:04

## 2017-09-12 RX ADMIN — ACETAMINOPHEN 1000 MG: 500 TABLET ORAL at 08:13

## 2017-09-12 RX ADMIN — ACETAMINOPHEN 975 MG: 325 TABLET, FILM COATED ORAL at 21:39

## 2017-09-12 RX ADMIN — HYDROMORPHONE HYDROCHLORIDE 0.5 MG: 1 INJECTION, SOLUTION INTRAMUSCULAR; INTRAVENOUS; SUBCUTANEOUS at 11:04

## 2017-09-12 RX ADMIN — FENTANYL CITRATE 50 MCG: 50 INJECTION, SOLUTION INTRAMUSCULAR; INTRAVENOUS at 09:32

## 2017-09-12 RX ADMIN — LIDOCAINE HYDROCHLORIDE 100 MG: 20 INJECTION, SOLUTION INFILTRATION; PERINEURAL at 09:11

## 2017-09-12 NOTE — LETTER
"Transition Communication Hand-off for Care Transitions to Next Level of Care Provider    Name: Mary Carrera  MRN #: [id number]  Primary Care Provider: Lynn Lim     Primary Clinic: [facility] [address]     Reason for Hospitalization:  Osteoarthritis Left Knee   S/P total knee arthroplasty, left  Admit Date/Time: 9/12/2017  7:23 AM  Discharge Date: 9/13/2017  Payor Source: Payor: PREFERREDONE / Plan: [employer] ADMIN SERV OPEN ACCESS / Product Type: PPO /          Reason for Communication Hand-off Referral: Avoidable readmission within 30 days    Discharge Needs Assessment:  Needs       Most Recent Value    Equipment Currently Used at Home none    Transportation Available car, family or friend will provide        Follow-up plan:  No future appointments.    Any outstanding tests or procedures:        Referrals     Future Labs/Procedures    Physical Therapy Referral     Comments:    [facility]  [address] [place]   Phone [phone]  Fax [phone]    Treatment: Evaluation & Treatment  Special Instructions/Modalities: None  Special Programs: None    Please be aware that coverage of these services is subject to the terms and limitations of your health insurance plan.  Call member services at your health plan with any benefit or coverage questions.      **Note to Provider:  If you are referring outside of [place] for the therapy appointment, please list the name of the location in the \"special instructions\" above, print the referral and give to the patient to schedule the appointment.            Doris Brown RN, BSN  Care Coordinator,   Phone [phone]  Pager [phone]    AVS/Discharge Summary is the source of truth; this is a helpful guide for improved communication of patient story          "

## 2017-09-12 NOTE — PLAN OF CARE
Problem: Knee Replacement, Total (Adult)  Goal: Signs and Symptoms of Listed Potential Problems Will be Absent or Manageable (Knee Replacement, Total)  Signs and symptoms of listed potential problems will be absent or manageable by discharge/transition of care (reference Knee Replacement, Total (Adult) CPG).   Outcome: Improving            VS:    VSS. In PACU, BP slightly high but has come down since being on the unit. Incentive spirometry taught and encouraged.   Output:    Pt voiding per BSC, due to be bladder scanned. LBM 9/11. BS active in all quadrants.    Activity:    Activity as tolerated, pt up with PT and gait steady. AO1 and walker.    Skin: Skin is intact except for incisions. Skin is dry, warm, and without discoloration.    Pain:    Pain is being managed with PRN Oxycodone 5-10 mg Q3H, given 5 mg x1. Given scheduled tylenol. Ice applied. Pt had block and is still wearing off.    CMS:    CMS intact, pt denies numbness and tingling. DP +2 bilaterally. Able to wiggle toes bilaterally.    Dressing:    Dressing to LLE CDI, ACE wrapped. Ice applied.    Diet:    Pt tolerating clear liquid diet without nausea or vomiting, advance as appropriate.    LDA:    PIV infusing fluids. Pt has hemovac, 220 mL output.    Equipment:    Pt has walker, gait belt, commode, IV pole, capno machine, CPAP, and PCD's in room.    Plan:    Continue to monitor patient and manage pain. Monitor vital signs.    Additional Info:    Capno off due to patient having CPAP. Spot check as needed.

## 2017-09-12 NOTE — PROGRESS NOTES
Pt arrived to unit 8A at 1345 with daughter at bedside. Received report from SHELBY Peterson. Capno off due to CPAP being on; will continue to monitor and spot check as needed. VSS; blood pressure slightly high. On 6LPM O2 with CPAP and 94-96%; continue to titrate down as appropriate. Pt arrived with all belongings and oriented to call light. Will continue to monitor patient and manage pain as needed.

## 2017-09-12 NOTE — ANESTHESIA CARE TRANSFER NOTE
Patient: Mary Carrera    Procedure(s):  Left Total Knee Arthroplasty  - Wound Class: I-Clean    Diagnosis: Osteoarthritis Left Knee   Diagnosis Additional Information: No value filed.    Anesthesia Type:   Spinal     Note:  Airway :Nasopharyngeal Airway and Face Mask  Patient transferred to:PACU  Comments: Patient is drowsy but easily arousable; denies pain. VSS, + airway obstruction despite nasal airway, Sitting position and FM O2, Dr Davis present. Awaiting C-Pap set up perRT. O2 sats drift to 91% but rise to 93% with prompting. IV is patent. Report to RN.      Vitals: (Last set prior to Anesthesia Care Transfer)    CRNA VITALS  9/12/2017 1103 - 9/12/2017 1146      9/12/2017             NIBP: (!)  176/92    Pulse: 89    SpO2: 92 %    Resp Rate (observed): 20    EKG: Sinus rhythm                Electronically Signed By: DEX Huston CRNA  September 12, 2017  11:46 AM

## 2017-09-12 NOTE — PROGRESS NOTES
" 09/12/17 1514   Quick Adds   Type of Visit Initial PT Evaluation       Present no   Language English   Living Environment   Lives With child(gaudencio), adult   Living Arrangements house   Number of Stairs to Enter Home 0   Number of Stairs Within Home 0   Stair Railings at Home none   Transportation Available family or friend will provide   Living Environment Comment Pt lives in a mother-in-law suite. No stairs to navigate. Will have assist as needed   Self-Care   Dominant Hand right   Usual Activity Tolerance good   Current Activity Tolerance moderate   Regular Exercise no   Equipment Currently Used at Home none  (owns walker)   Functional Level Prior   Ambulation 0-->independent   Transferring 0-->independent   Toileting 0-->independent   Bathing 0-->independent   Dressing 0-->independent   Eating 0-->independent   Communication 0-->understands/communicates without difficulty   Swallowing 0-->swallows foods/liquids without difficulty   Cognition 0 - no cognition issues reported   Fall history within last six months no   Which of the above functional risks had a recent onset or change? ambulation;transferring   General Information   Onset of Illness/Injury or Date of Surgery - Date 09/12/17   Referring Physician Jaime Dickson MD   Patient/Family Goals Statement \"To get home as soon as possible\"   Pertinent History of Current Problem (include personal factors and/or comorbidities that impact the POC) s/p L TKA   Precautions/Limitations fall precautions   Weight-Bearing Status - LUE full weight-bearing   Weight-Bearing Status - RUE full weight-bearing   Weight-Bearing Status - LLE weight-bearing as tolerated   Weight-Bearing Status - RLE full weight-bearing   General Observations Supine in bed upon arrival, pleasant and agreeable   General Info Comments IV, hemovac   Cognitive Status Examination   Orientation orientation to person, place and time   Level of Consciousness alert   Follows Commands and " Answers Questions 100% of the time;able to follow multistep instructions   Personal Safety and Judgment intact   Memory intact   Pain Assessment   Patient Currently in Pain Yes, see Vital Sign flowsheet   Integumentary/Edema   Integumentary/Edema Comments Pt with normal post-surgical swelling present   Posture    Posture Forward head position;Protracted shoulders;Kyphosis   Posture Comments Mild to moderate sitting EOB   Range of Motion (ROM)   ROM Comment L knee AAROM=0-3-105. R LE WFL   Strength   Strength Comments Did not formally assess, good quality contractions with supine exercises. Requires light Lulú for SLR on L.    Bed Mobility   Bed Mobility Comments Pt completes supine<>sit transfer with CGA to L LE only   Transfer Skills   Transfer Comments Pt completes sit<>stand transfer and bed<>commode transfer with CGA and use of walker   Gait   Gait Comments Pt toleraets ambulating in mckenna with CGA and use of walker, fairly safe and steady throughout   Balance   Balance Comments Independent sitting balance, CGA for standing balance with UE support from walker   Sensory Examination   Sensory Perception no deficits were identified   General Therapy Interventions   Planned Therapy Interventions balance training;bed mobility training;gait training;ROM;strengthening;transfer training;risk factor education;home program guidelines;progressive activity/exercise   Clinical Impression   Criteria for Skilled Therapeutic Intervention yes, treatment indicated   PT Diagnosis impaired functional mobility   Influenced by the following impairments increased post-op pain, decreased L LE strength and ROM   Functional limitations due to impairments impaired bed mobility, transfers and ambulation   Clinical Presentation Stable/Uncomplicated   Clinical Presentation Rationale s/p L TKA, mobilizing well post-operatively   Clinical Decision Making (Complexity) Low complexity   Therapy Frequency` 2 times/day   Predicted Duration of  "Therapy Intervention (days/wks) 4 days   Anticipated Equipment Needs at Discharge (has walker)   Anticipated Discharge Disposition Home with Assist;Home with Home Therapy;Home with Outpatient Therapy  (Plans to discuss with daughter)   Risk & Benefits of therapy have been explained Yes   Patient, Family & other staff in agreement with plan of care Yes   [facility] TM \"6 Clicks\"   2016, Trustees of [facility], under license to [employer].  All rights reserved.   6 Clicks Short Forms Basic Mobility Inpatient Short Form   [facility]  \"6 Clicks\" V.2 Basic Mobility Inpatient Short Form   1. Turning from your back to your side while in a flat bed without using bedrails? 4 - None   2. Moving from lying on your back to sitting on the side of a flat bed without using bedrails? 4 - None   3. Moving to and from a bed to a chair (including a wheelchair)? 4 - None   4. Standing up from a chair using your arms (e.g., wheelchair, or bedside chair)? 4 - None   5. To walk in hospital room? 4 - None   6. Climbing 3-5 steps with a railing? 3 - A Little   Basic Mobility Raw Score (Score out of 24.Lower scores equate to lower levels of function) 23   Total Evaluation Time   Total Evaluation Time (Minutes) 9     "

## 2017-09-12 NOTE — OR NURSING
There is a tiny scab noted  at the top of her incision on the right knee. Chava states this area gets a scab and she scratches it and this area opens. She pointed this out to the Surgeon. She has a rash on bilateral hands and upper neck. Small areas on the face, red blotches. Under bilateral breasts she has some redness noted. This area is very moist. Bilateral groins red and very moist. Bilateral lower legs scabs noted. Chava states she scratches lower legs

## 2017-09-12 NOTE — BRIEF OP NOTE
Orthopaedic Surgery Brief Op-Note      Patient: Mary Carrera  : 1950  Date of Service: 2017 11:39 AM    Pre-operative Diagnosis: Osteoarthritis Left Knee   Post-operative Diagnosis: same    Procedure(s) Performed: Procedure(s):  Left Total Knee Arthroplasty  - Wound Class: I-Clean    Staff: Severo Conroy MD  Assistants:   Reyes Gonzales MD    Anesthesia: General  EBL: 200 cc  UOP: see anesthesia record  Tourniquet Time: 69 min    Implants:     Implant Name Type Inv. Item Serial No.  Lot No. LRB No. Used   BONE CEMENT SIMPLEX W/TOBRAMYCIN 6197-9-001 Cement, Bone BONE CEMENT SIMPLEX W/TOBRAMYCIN 6197-9-001  [employer] [id number] Left 2   IMP TIBIAL ZIM PSN NP [facility] 5DEG [facility] -350-01 Total Joint Component/Insert IMP TIBIAL ZIM PSN NP [facility] 5DEG [facility] -329-01  [employer] [id number] Left 1   IMP PATELLA ZIM KNEE ALL JUANA 35MM -559-35 Total Joint Component/Insert IMP PATELLA ZIM KNEE ALL JUANA 35MM -246-35  [employer] [id number] Left 1   PERSONA THE PERSONALIZED KNEE SYSTEM; FEMUR CEMENTED POSTERIOR STABILIZER (PS) NAROW  LEFT     [employer] [id number] Left 1   PERSONA THE PERSONALIZED KNEE SYSTEM VIVACIT-E HIGHLY CROSSLINKED POLYETHYLENE ARTICULAR SURFACE FIXED BEARING POSTERIOR STABILIZED (PS) LEFT       [employer] [id number] Left 1            Drains: medium hemovac  Intra-op Labs/Cxs/Specimens: None  Complications: No apparent complications during procedure  Findings: Please see dictated operative note for details    Disposition: Stable to PACU, then admit to Orthopaedics.        Assessment/Plan:   Mary Carrera is a 67 year old female s/p Procedure(s):  Left Total Knee Arthroplasty  - Wound Class: I-Clean on 2017 with Dr. Conroy.    Activity: Up with assist.  Weight bearing status: AT   Antibiotics: Ancef x 24 hours.  Diet: Begin with clear fluids and progress diet as tolerated.  Bowel regimen. Anti-emetics PRN.    DVT prophylaxis: ASA pod 1  Dressing:  Leave in place until clinic  Bracing/Splinting: none  Drains: Document output per shift, discontinue when <30cc/shift.   Cultures: Pending, follow culture results closely.    Pain management: transition from IV to orals as tolerated.    Physical Therapy/Occupational Therapy  Follow-up: Clinic with Dr. Conroy in 2 weeks, x-rays needed.    Future Appointments  Date Time Provider Department Center   9/13/2017 9:30 AM Samantha Wilson Pt, PT [facility] [place]   9/13/2017 10:30 AM Bere Stanford, OT [facility] [place]   9/13/2017 2:00 PM Samantha Wilson Pt, PT [facility] [place]       Disposition: Pending progress with therapies, pain control on orals, and medical stability, anticipate discharge to home on POD # 2 vs 3.    Signed:   Reyes Gonzales MD  Adult Reconstructive Surgery Fellow  Dept Orthopaedic Surgery, [facility]

## 2017-09-12 NOTE — IP AVS SNAPSHOT
"    UR 8A: [phone]                                              INTERAGENCY TRANSFER FORM - PHYSICIAN ORDERS   2017                    Hospital Admission Date: 2017  SINTIA HALL   : 1950  Sex: Female        Attending Provider: Severo Conroy MD     Allergies:  No Known Allergies    Infection:  None   Service:  SURGERY    Ht:  1.626 m (5' 4\")   Wt:  107.4 kg (236 lb 12.4 oz)   Admission Wt:  107.4 kg (236 lb 12.4 oz)    BMI:  40.64 kg/m 2   BSA:  2.2 m 2            Patient PCP Information     Provider PCP Type    Lynn Lim MD General      ED Clinical Impression     Diagnosis Description Comment Added By Time Added    S/P total knee arthroplasty, left [Z96.652] S/P total knee arthroplasty, left [Z96.652]  Jaime Dickson MD 2017  9:15 AM      Hospital Problems as of 2017              Priority Class Noted POA    S/P total knee arthroplasty, left Medium  2017 Yes      Non-Hospital Problems as of 2017              Priority Class Noted    Leiomyoma of uterus Medium  2002    Obesity Medium  2004    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    Hearing loss Medium  2006    Pure hypercholesterolemia Medium  2007    Depression, major, recurrent, in complete remission (H) Medium  2013    Alopecia areata Medium  2013    Health Care Home Medium  2013    ACP (advance care planning) Medium  2014    Osteoarthritis of right knee Medium  2016    MANNIE (obstructive sleep apnea) Medium  2017      Code Status History     Date Active Date Inactive Code Status Order ID Comments User Context    2016  1:41 PM 2016  3:41 PM Full Code [id number]  Roge Castellon MD Inpatient         Medication Review      START taking        Dose / Directions Comments    aspirin 81 MG EC tablet        Dose:  81 mg   Take 1 tablet (81 mg) by mouth 2 times daily for 28 days   Quantity:  56 tablet   Refills:  0        oxyCODONE 5 MG " IR tablet   Commonly known as:  ROXICODONE        Dose:  5-10 mg   Take 1-2 tablets (5-10 mg) by mouth every 4 hours as needed for moderate to severe pain   Quantity:  80 tablet   Refills:  0        senna-docusate 8.6-50 MG per tablet   Commonly known as:  SENOKOT-S;PERICOLACE        Dose:  1-2 tablet   Take 1-2 tablets by mouth 2 times daily for 14 days   Quantity:  56 tablet   Refills:  0          CONTINUE these medications which may have CHANGED, or have new prescriptions. If we are uncertain of the size of tablets/capsules you have at home, strength may be listed as something that might have changed.        Dose / Directions Comments    * ibuprofen 800 MG tablet   Commonly known as:  ADVIL/MOTRIN   This may have changed:  Another medication with the same name was added. Make sure you understand how and when to take each.        Dose:  800 mg   Take 800 mg by mouth   Refills:  0        * ibuprofen 800 MG tablet   Commonly known as:  ADVIL/MOTRIN   This may have changed:  You were already taking a medication with the same name, and this prescription was added. Make sure you understand how and when to take each.        Dose:  800 mg   Take 1 tablet (800 mg) by mouth every 12 hours as needed for moderate pain   Quantity:  60 tablet   Refills:  1        * Notice:  This list has 2 medication(s) that are the same as other medications prescribed for you. Read the directions carefully, and ask your doctor or other care provider to review them with you.      CONTINUE these medications which have NOT CHANGED        Dose / Directions Comments    acetaminophen 325 MG tablet   Commonly known as:  TYLENOL   Used for:  Primary osteoarthritis of right knee        Dose:  650 mg   Take 2 tablets (650 mg) by mouth every 4 hours as needed for other (surgical pain)   Quantity:  100 tablet   Refills:  0        hydrocortisone 0.2 % cream   Commonly known as:  WESTCORT   Used for:  Other eczema        Apply topically 2 times daily    Quantity:  45 g   Refills:  0        multivitamin  with lutein Caps per capsule   Used for:  Primary osteoarthritis of right knee, Pre-op exam        Dose:  1 capsule   Take 1 capsule by mouth daily   Refills:  0        order for DME        Equipment ordered: RESMED Auto PAP Mask type: Nasal Settings: 8-16 CM H2O   Refills:  0        PREVIDENT 5000 BOOSTER PLUS 1.1 % Pste   Generic drug:  Sodium Fluoride        Refills:  2        sertraline 100 MG tablet   Commonly known as:  ZOLOFT   Used for:  Depression, major, recurrent, in complete remission (H)        Dose:  100 mg   Take 1 tablet (100 mg) by mouth daily   Quantity:  90 tablet   Refills:  3                Summary of Visit     Reason for your hospital stay       Left total knee arthroplasty             After Care     Activity       Your activity upon discharge: activity as tolerated, weightbearing as tolerated       Diet       Follow this diet upon discharge: Regular       Discharge Instructions       Surgery: Left Total Knee Replacement on 9/12/17.    If you have any questions contact Dr. Conroy at the following numbers: if you see Dr. Conroy at [facility] call [phone].  If you see him at [facility] call [phone].      Follow up appointment:   You are scheduled to follow up with Dr. Conroy approximately 2 weeks after your surgery.  If you were seen at [facility], your appointment will most likely be there.  If you were seen at the [facility] at [facility], your appointment will likely be there.    Contact clinic if you have any questions about the date or time.    Anticoagulation:   Take the aspirin 81mg tabs twice daily prescribed for the total of 28 days.  This is given to help minimize your risk of blood clot.    Activity:   -Continue to weight bear on your operative leg as tolerated by pain.  As you are more comfortable, you can discontinue use of the walker and transition to a cane.  Once you are comfortable with the cane, you can also wean from  "the cane and progress to using no assistive devices.  Do not transition until you are comfortable and have good balance.      -Continue to work on knee range of motion to prevent stiffness.      -When you are sitting, \"bridge the leg\" and keep the leg fully straight, with a bump under the heel to prevent a flexion contracture and ensure that you can fully straighten the knee.    -Work on getting your leg fully straight while walking - landing on your heel and progressing over the knee normally to prevent a flexion contracture.        Pain Medications:   -Take pain medications as prescribed.  Wean off the the narcotic pain medications (Oxycodone, Dilaudid, etc) as tolerated by pain.  Only take the narcotic pain medication if not relieved by the other medications.  To help with this, take the Tylenol and Celebrex (if prescribed) to minimize the amount of narcotic pain medication you need to take.      -Do not drive while on pain medications or until your leg feels well enough to do so.      Bandage Care and Showering:   -You can shower with the adhesive bandage covering your knee at the time of discharge.    -Remove the adhesive bandage 10 days after your surgery.  This can be removed at home.  Once removed, it is common to have a few scabs.  Let the scabs fall off on their own - they will do so over the next week or two.  The sutures are resorbable and nothing needs to be removed.    --Once the bandage is removed, you can shower without covering the incision.  Do not soak or bathe the incision in water.  Do not scrub the incision.  Just let the water from the shower run over the incision.    --Contact Dr. Conroy or his staff if there is any drainage from the incision or redness.    Leg Swelling and Icing  -Continue icing the knee 5-6 times per day for approximately 20 minutes each time.  This will help with swelling.    -Some swelling in the leg is normal after surgery.  This type of swelling is usually gravity " "dependent and is improved in the morning after sleeping.  If the swelling is painful in the calf or the swelling is getting worse, contact Dr. Conroy's office.  We may recommend presenting to the Emergency Department to obtain an ultrasound of the leg if there is concern for a DVT.      -Elevate the leg if you are sitting as this will help reduce swelling.    Contact clinic if you note any of the following:  -Fevers greater than 101.5 chills  -Increased pain, redness, swelling or discharge at the surgical site.   -During regular business hours call Dr Conroy's office and request to speak with his nurse or the triage nurses. If you see him at [facility] call [phone]. If you see him at [facility] [place] call [phone].   -After hours or on weekends call the hospital  at [phone] and ask to speak with the Orthopaedic resident on call.             Referrals     Physical Therapy Referral       [facility]  [address]   Phone [phone]  Fax [phone]    Treatment: Evaluation & Treatment  Special Instructions/Modalities: None  Special Programs: None    Please be aware that coverage of these services is subject to the terms and limitations of your health insurance plan.  Call member services at your health plan with any benefit or coverage questions.      **Note to Provider:  If you are referring outside of [place] for the therapy appointment, please list the name of the location in the \"special instructions\" above, print the referral and give to the patient to schedule the appointment.             Follow-Up Appointment Instructions     Future Labs/Procedures    Follow Up and recommended labs and tests     Comments:    You are scheduled to follow up with Dr. Conroy approximately 2 weeks after your surgery.  If you were seen at [facility], your appointment will most likely be there.  If you were seen at the [facility] at [facility], your appointment will likely be there.    " Contact clinic if you have any questions about the date or time.      Follow-Up Appointment Instructions     Follow Up and recommended labs and tests       You are scheduled to follow up with Dr. Conroy approximately 2 weeks after your surgery.  If you were seen at [facility], your appointment will most likely be there.  If you were seen at the [facility] at [facility], your appointment will likely be there.    Contact clinic if you have any questions about the date or time.             Statement of Approval     Ordered          09/13/17 1328  I have reviewed and agree with all the recommendations and orders detailed in this document.  EFFECTIVE NOW     Approved and electronically signed by:  Adrienne Bonilla APRN CNP

## 2017-09-12 NOTE — PLAN OF CARE
"Problem: Goal Outcome Summary  Goal: Goal Outcome Summary  PT evaluation complete and treatment initiated. Doing very well on POD #0, states \"I can't believe how easy this one is\". Completes supine<>sit transfer with CGA to L LE only. Sits EOB with good tolerance, VSS. Completes sit<>stand transfer and bed<>commode transfer with CGA using walker, safe throughout. Independent with toilet hygiene. Tolerated ambulating good distances in the [place] with walker and CGA, steady throughout. Participated in supine exercises per protocol. L knee AAROM=[id number]. Anticipate d/c home (pt lives in a mother-in-law suite at her daughter's) with assist and home PT vs OP PT. Pt to discuss with daughter preference for continued therapy.       "

## 2017-09-12 NOTE — IP AVS SNAPSHOT
MRN:[id number]                      After Visit Summary   9/12/2017    Mary Carrera    MRN: [id number]           Thank you!     Thank you for choosing [facility] for your care. Our goal is always to provide you with excellent care. Hearing back from our patients is one way we can continue to improve our services. Please take a few minutes to complete the written survey that you may receive in the mail after you visit with us. Thank you!        Patient Information     Date Of Birth          1950        Designated Caregiver       Most Recent Value    Caregiver    Will someone help with your care after discharge? yes    Name of designated caregiver Priyanka - daughter    Phone number of caregiver [phone]    Caregiver address [address] [place] [address]      About your hospital stay     You were admitted on:  September 12, 2017 You last received care in the:  UR 8A    You were discharged on:  September 13, 2017        Reason for your hospital stay       Left total knee arthroplasty                  Who to Call     For medical emergencies, please call 911.  For non-urgent questions about your medical care, please call your primary care provider or clinic, [phone]  For questions related to your surgery, please call your surgery clinic        Attending Provider     Provider Specialty    Severo Conroy MD Orthopedics       Primary Care Provider Office Phone # Fax #    Lynn Lim [phone]       When to contact your care team       Temperature >101.5 sustained for >2 hours, increased drainage, or shortness of breath.                  After Care Instructions     Activity       Your activity upon discharge: activity as tolerated, weightbearing as tolerated            Diet       Follow this diet upon discharge: Regular            Discharge Instructions       Surgery: Left Total Knee Replacement on 9/12/17.    If you have any questions contact Dr. Conroy at the  "following numbers: if you see Dr. Conroy at [facility] call [phone].  If you see him at [facility] call [phone].      Follow up appointment:   You are scheduled to follow up with Dr. Conroy approximately 2 weeks after your surgery.  If you were seen at [facility], your appointment will most likely be there.  If you were seen at the [facility], your appointment will likely be there.    Contact clinic if you have any questions about the date or time.    Anticoagulation:   Take the aspirin 81mg tabs twice daily prescribed for the total of 28 days.  This is given to help minimize your risk of blood clot.    Activity:   -Continue to weight bear on your operative leg as tolerated by pain.  As you are more comfortable, you can discontinue use of the walker and transition to a cane.  Once you are comfortable with the cane, you can also wean from the cane and progress to using no assistive devices.  Do not transition until you are comfortable and have good balance.      -Continue to work on knee range of motion to prevent stiffness.      -When you are sitting, \"bridge the leg\" and keep the leg fully straight, with a bump under the heel to prevent a flexion contracture and ensure that you can fully straighten the knee.    -Work on getting your leg fully straight while walking - landing on your heel and progressing over the knee normally to prevent a flexion contracture.        Pain Medications:   -Take pain medications as prescribed.  Wean off the the narcotic pain medications (Oxycodone, Dilaudid, etc) as tolerated by pain.  Only take the narcotic pain medication if not relieved by the other medications.  To help with this, take the Tylenol and Celebrex (if prescribed) to minimize the amount of narcotic pain medication you need to take.      -Do not drive while on pain medications or until your leg feels well enough to do so.      Bandage Care and Showering:   -You can shower with the adhesive bandage " covering your knee at the time of discharge.    -Remove the adhesive bandage 10 days after your surgery.  This can be removed at home.  Once removed, it is common to have a few scabs.  Let the scabs fall off on their own - they will do so over the next week or two.  The sutures are resorbable and nothing needs to be removed.    --Once the bandage is removed, you can shower without covering the incision.  Do not soak or bathe the incision in water.  Do not scrub the incision.  Just let the water from the shower run over the incision.    --Contact Dr. Conroy or his staff if there is any drainage from the incision or redness.    Leg Swelling and Icing  -Continue icing the knee 5-6 times per day for approximately 20 minutes each time.  This will help with swelling.    -Some swelling in the leg is normal after surgery.  This type of swelling is usually gravity dependent and is improved in the morning after sleeping.  If the swelling is painful in the calf or the swelling is getting worse, contact Dr. Conroy's office.  We may recommend presenting to the Emergency Department to obtain an ultrasound of the leg if there is concern for a DVT.      -Elevate the leg if you are sitting as this will help reduce swelling.    Contact clinic if you note any of the following:  -Fevers greater than 101.5 chills  -Increased pain, redness, swelling or discharge at the surgical site.   -During regular business hours call Dr Conroy's office and request to speak with his nurse or the triage nurses. If you see him at [facility] call [phone]. If you see him at [facility] call [phone].   -After hours or on weekends call the hospital  at [phone] and ask to speak with the Orthopaedic resident on call.                  Follow-up Appointments     Follow Up and recommended labs and tests       You are scheduled to follow up with Dr. Conroy approximately 2 weeks after your surgery.  If you were seen at [facility],  "your appointment will most likely be there.  If you were seen at the [facility], your appointment will likely be there.    Contact clinic if you have any questions about the date or time.                  Future tests that were ordered for you     Assign Questionnaire Series to Patient                 Pending Results     No orders found for last 3 day(s).            Statement of Approval     Ordered          17 1328  I have reviewed and agree with all the recommendations and orders detailed in this document.  EFFECTIVE NOW     Approved and electronically signed by:  Adrienne Bonilla APRN CNP             Admission Information     Date & Time Provider Department Dept. Phone    2017 Severo Conroy MD UR 8A [phone]      Your Vitals Were     Blood Pressure Pulse Temperature Respirations Height Weight    112/56 (BP Location: Right arm) 77 97.9  F (36.6  C) (Oral) 16 1.626 m (5' 4\") 107.4 kg (236 lb 12.4 oz)    Last Period Pulse Oximetry BMI (Body Mass Index)             10/15/2003 94% 40.64 kg/m2         Dreampod Information     Dreampod lets you send messages to your doctor, view your test results, renew your prescriptions, schedule appointments and more. To sign up, go to [email or web address] . Click on \"Log in\" on the left side of the screen, which will take you to the Welcome page. Then click on \"Sign up Now\" on the right side of the page.     You will be asked to enter the access code listed below, as well as some personal information. Please follow the directions to create your username and password.     Your access code is: [email or web address]  Expires: 2017  2:16 PM     Your access code will  in 90 days. If you need help or a new code, please call your [facility] clinic or [phone].        Care EveryWhere ID     This is your Care EveryWhere ID. This could be used by other organizations to access your [facility] medical records  [id number]        Equal Access to Services     " CARMEN HOOPER : Hadii aad ku lexa Socathi, waaxda luqadaha, qaybta kaalmada adeegaracelis, keren eyal beaverhumerajennifer vo . So [facility] [phone].    ATENCIÓN: Si karon krishnan, tiene a lehman disposición servicios gratuitos de asistencia lingüística. Llame al [phone].    We comply with applicable federal civil rights laws and [place] laws. We do not discriminate on the basis of race, color, national origin, age, disability sex, sexual orientation or gender identity.               Review of your medicines      START taking        Dose / Directions    aspirin 81 MG EC tablet        Dose:  81 mg   Take 1 tablet (81 mg) by mouth 2 times daily for 28 days   Quantity:  56 tablet   Refills:  0       oxyCODONE 5 MG IR tablet   Commonly known as:  ROXICODONE        Dose:  5-10 mg   Take 1-2 tablets (5-10 mg) by mouth every 4 hours as needed for moderate to severe pain   Quantity:  80 tablet   Refills:  0       senna-docusate 8.6-50 MG per tablet   Commonly known as:  SENOKOT-S;PERICOLACE        Dose:  1-2 tablet   Take 1-2 tablets by mouth 2 times daily for 14 days   Quantity:  56 tablet   Refills:  0         CONTINUE these medicines which may have CHANGED, or have new prescriptions. If we are uncertain of the size of tablets/capsules you have at home, strength may be listed as something that might have changed.        Dose / Directions    * ibuprofen 800 MG tablet   Commonly known as:  ADVIL/MOTRIN   This may have changed:  Another medication with the same name was added. Make sure you understand how and when to take each.        Dose:  800 mg   Take 800 mg by mouth   Refills:  0       * ibuprofen 800 MG tablet   Commonly known as:  ADVIL/MOTRIN   This may have changed:  You were already taking a medication with the same name, and this prescription was added. Make sure you understand how and when to take each.        Dose:  800 mg   Take 1 tablet (800 mg) by mouth every 12 hours as needed for moderate pain    Quantity:  60 tablet   Refills:  1       * Notice:  This list has 2 medication(s) that are the same as other medications prescribed for you. Read the directions carefully, and ask your doctor or other care provider to review them with you.      CONTINUE these medicines which have NOT CHANGED        Dose / Directions    acetaminophen 325 MG tablet   Commonly known as:  TYLENOL   Used for:  Primary osteoarthritis of right knee        Dose:  650 mg   Take 2 tablets (650 mg) by mouth every 4 hours as needed for other (surgical pain)   Quantity:  100 tablet   Refills:  0       hydrocortisone 0.2 % cream   Commonly known as:  WESTCORT   Used for:  Other eczema        Apply topically 2 times daily   Quantity:  45 g   Refills:  0       multivitamin  with lutein Caps per capsule   Used for:  Primary osteoarthritis of right knee, Pre-op exam        Dose:  1 capsule   Take 1 capsule by mouth daily   Refills:  0       order for DME        Equipment ordered: RESMED Auto PAP Mask type: Nasal Settings: 8-16 CM H2O   Refills:  0       PREVIDENT 5000 BOOSTER PLUS 1.1 % Pste   Generic drug:  Sodium Fluoride        Refills:  2       sertraline 100 MG tablet   Commonly known as:  ZOLOFT   Used for:  Depression, major, recurrent, in complete remission (H)        Dose:  100 mg   Take 1 tablet (100 mg) by mouth daily   Quantity:  90 tablet   Refills:  3            Where to get your medicines      These medications were sent to [facility] [address]  [address] [address]     Phone:  [phone]     aspirin 81 MG EC tablet    ibuprofen 800 MG tablet    senna-docusate 8.6-50 MG per tablet         Some of these will need a paper prescription and others can be bought over the counter. Ask your nurse if you have questions.     Bring a paper prescription for each of these medications     oxyCODONE 5 MG IR tablet                Protect others around you: Learn how to safely use,  store and throw away your medicines at www.disposemymeds.org.             Medication List: This is a list of all your medications and when to take them. Check marks below indicate your daily home schedule. Keep this list as a reference.      Medications           Morning Afternoon Evening Bedtime As Needed    acetaminophen 325 MG tablet   Commonly known as:  TYLENOL   Take 2 tablets (650 mg) by mouth every 4 hours as needed for other (surgical pain)   Last time this was given:  975 mg on 9/13/2017  5:12 AM                                aspirin 81 MG EC tablet   Take 1 tablet (81 mg) by mouth 2 times daily for 28 days   Last time this was given:  81 mg on 9/13/2017  8:05 AM                                hydrocortisone 0.2 % cream   Commonly known as:  WESTCORT   Apply topically 2 times daily                                * ibuprofen 800 MG tablet   Commonly known as:  ADVIL/MOTRIN   Take 800 mg by mouth                                * ibuprofen 800 MG tablet   Commonly known as:  ADVIL/MOTRIN   Take 1 tablet (800 mg) by mouth every 12 hours as needed for moderate pain                                multivitamin  with lutein Caps per capsule   Take 1 capsule by mouth daily                                order for DME   Equipment ordered: RESMED Auto PAP Mask type: Nasal Settings: 8-16 CM H2O                                oxyCODONE 5 MG IR tablet   Commonly known as:  ROXICODONE   Take 1-2 tablets (5-10 mg) by mouth every 4 hours as needed for moderate to severe pain   Last time this was given:  10 mg on 9/13/2017 11:28 AM                                PREVIDENT 5000 BOOSTER PLUS 1.1 % Pste   Generic drug:  Sodium Fluoride                                senna-docusate 8.6-50 MG per tablet   Commonly known as:  SENOKOT-S;PERICOLACE   Take 1-2 tablets by mouth 2 times daily for 14 days   Last time this was given:  2 tablets on 9/13/2017  8:05 AM                                sertraline 100 MG tablet   Commonly  known as:  ZOLOFT   Take 1 tablet (100 mg) by mouth daily                                * Notice:  This list has 2 medication(s) that are the same as other medications prescribed for you. Read the directions carefully, and ask your doctor or other care provider to review them with you.

## 2017-09-12 NOTE — IP AVS SNAPSHOT
UR 8A    [address]    [facility] [address]    Phone:  [phone]                                       After Visit Summary   9/12/2017    Mary Carrera    MRN: [id number]           After Visit Summary Signature Page     I have received my discharge instructions, and my questions have been answered. I have discussed any challenges I see with this plan with the nurse or doctor.    ..........................................................................................................................................  Patient/Patient Representative Signature      ..........................................................................................................................................  Patient Representative Print Name and Relationship to Patient    ..................................................               ................................................  Date                                            Time    ..........................................................................................................................................  Reviewed by Signature/Title    ...................................................              ..............................................  Date                                                            Time

## 2017-09-12 NOTE — CONSULTS
DATE OF EXAMINATION:  09/12/2017       ASSESSMENT:   1.  Status post left total knee arthroplasty.  The patient is doing well postoperatively.   2.  History of right total knee arthroplasty last year with postop complications of hypoxia secondary to at that time undiagnosed sleep apnea.   3.  Sleep apnea, formally diagnosed earlier this year.  The patient has been noncompliant with CPAP.   4.  Unknown coronary artery status.  She has never had a formal coronary artery evaluation.      RECOMMENDATIONS:  Routine postoperative labs are ordered.  The patient will utilize her own CPAP machine and will be encouraged to wear at night and when napping during the daytime.  Deep venous thrombosis prophylaxis will be with aspirin, per Dr. Conroy's protocol.      Thank you for having me see this patient.      DISCUSSION:  Mary Carrera is a very pleasant 67-year-old female who underwent a left total knee arthroplasty by Dr. Conroy for the treatment of osteoarthritis of the knee.  I have been asked to see her by Dr. Conroy to assess obstructive sleep apnea.      Please see Dr. Conroy's notes in the charting for details regarding the patient's orthopedic history and the indications for surgery.  The patient is known to me from December of last year when she underwent a right total knee arthroplasty.  Her postoperative course was complicated by hypoxia secondary to at that time undiagnosed sleep apnea.      The events of this surgery are reviewed.  She did receive general anesthesia, blood loss was 200 mL.  Vital signs have been stable throughout.      Ms. Carrera is seen by me shortly after admission to the Orthopedic unit.  She describes good pain control at the current time.  She denies cough, chest pain, shortness of breath, abdominal pain, nausea, vomiting.      PAST MEDICAL HISTORY:  Obstructive sleep apnea diagnosed following her last hospitalization, she has been compliant with CPAP and feels that it has helped the daytime  hypersomnolence.  She denies recent respiratory illnesses.  Her past medical history otherwise is remarkable for obesity and depression.  The patient has never had a formal coronary artery evaluation.  No history of DVT or PE.      MEDICATIONS PREOPERATIVELY:     1.  Zoloft 100 mg daily.   2.  Ibuprofen on a p.r.n. basis.   3.  Multivitamins once a day.      ALLERGIES:  None.      FAMILY AND SOCIAL HISTORY:  Reviewed and are as described in the chart.      REVIEW OF SYSTEMS:  A 10-point review of systems negative except as noted above.      PHYSICAL EXAMINATION:   GENERAL:  She is a sleepy, obese female who easily awakens and appears comfortable.  She is wearing nasal CPAP.  BMI is 40.6 mg/m2.   VITAL SIGNS:  Blood pressure is 147/54.  Heart rate is in the 70s.  O2 saturations are in the 90s with BiPAP in place.   HEENT:  Face is symmetric.  No oral lesions.   NECK:  Supple.   LUNGS:  Clear.   CARDIAC:  Reveals a regular rate and rhythm without murmurs.   ABDOMEN:  Soft, protuberant, nontender.   EXTREMITIES:  No edema.   NEUROLOGIC:  She is sleepy, does easily alert and is fully oriented.  Facies are symmetric.      LABORATORY DATA:  Preoperative labs are reviewed.  Hemoglobin was 14.7.  I do not have access to any recent basic metabolic panels.  Creatinine in December of last year was 0.76.  Most recent EKG from November of last year was normal and echocardiogram in December last year was unremarkable.  Left ventricular systolic function was normal, left ventricular diastolic function was indeterminate.         ELDER OROZCO MD             D: 2017 13:50   T: 2017 14:32   MT: CD      Name:     SINTIA HALL   MRN:      [id number]        Account:       [id number]   :      1950           Consult Date:  2017      Document: [id number]       cc: Severo Conroy MD

## 2017-09-12 NOTE — H&P
Preoperative History and Physical Interval Update  [facility]    History: Patient presents for left TKA.  Since their preoperative evaluation, there have been no changes to their medical history or symptoms.  The patient has been seen by their primary care physician and deemed safe to undergo the planned surgical procedure.  The preoperative labs were reviewed.    The patient was met in the preoperative holding area.  We reviewed the surgical plan and written, informed consent was obtained.  The left knee was marked with an indelible marked after confirmation of the correct operative site from the patient.      Her right TKA continues to function well.      Preoperative exam:  Body mass index is 40.64 kg/(m^2).    The skin overlying the surgical site is clean and dry.  There is no prior surgical incision.  Operative extremity:   Motor: ehl/fhl/gs/at intact  Sensory: sensation intact dp/sp/t/s/s   Vascular: 2+ DP pulse      Imaging: Preoperative films were reviewed confirming left knee OA.  Preoperative template was also reviewed.    Assessment:   Mary Carrera is a 67 year old female with left knee OA.  The surgical plan is for left TKA.  We have discussed the pros/cons/risks/benefits and potential complications of surgery detail in clinic and here on the day of surgery.  We have discussed the rehab, the length of the hospitalization and the type of implants.  Signed informed consent was obtained.  All the patient's questions were answered to their satisfaction.

## 2017-09-12 NOTE — PROGRESS NOTES
"       Interval History:     No acute events and doing well on floor.  Walked with PT. Pain is adequately controlled on current regimen.   Denies n/v, SOB, cp, dyspnea.         Physical Exam:     Vital Signs:  /54  Pulse 77  Temp 97.8  F (36.6  C) (Oral)  Resp 16  Ht 1.626 m (5' 4\")  Wt 107.4 kg (236 lb 12.4 oz)  LMP 10/15/2003  SpO2 96%  BMI 40.64 kg/m2    Intake/Output Summary (Last 24 hours) at 09/12/17 1647  Last data filed at 09/12/17 1600   Gross per 24 hour   Intake             1200 ml   Output              870 ml   Net              330 ml     Gen:    No acute distress. Alert.  Pulm:  Non-labored breathing  Incision:   Dressing c/d/i  Drain: w/ s/s output    Operative extremity:   Motor: ehl/fhl/gs/at intact  Sensory: intact light touch dp/sp/t/s/s  Vascular: 2+ DP pulse    Labs:  CBC  Recent Labs  Lab 09/07/17  1333   HGB 14.7       Imaging: post operative knee films reviewed showing implants in good position and well fixed.       Assessment and Plan:    Mary Carrera is a 67 year old female who is s/p left TKA on 9/12/2017.     Activity: Up with assist.  Bridge knee in extension while in bed.  Gait aids PRN.  OOB to chair for all meals.  Weight bearing status: WBAT  Antibiotics/Tetanus: Routine post operative antibiotics x 2 doses.  Diet: Begin with clear fluids and progress diet as tolerated.  DVT prophylaxis: SCDs while in hospital, 81 mg aspirin BID x4 weeks total.  Bracing/Splinting: No brace or splint needed.  Ice: Ice operative site 20 minutes >4 times daily.  Wound Care:Aquacel x 10 days, Ace PRN  Drains: Follow drain output  Pain management: transition from IV to orals as tolerated.   Physical Therapy/Occupational Therapy: WB and precautions noted above, gait transfers, ROM, ADL's.   Follow-up: Clinic with Dr. Conroy around 2 weeks postoperatively for wound check and xrays as needed.    Disposition: Pending progress with therapies, pain control on orals, and medical stability, " anticipate discharge to home on POD #2.

## 2017-09-12 NOTE — OR NURSING
PACU to Inpatient Nursing Handoff    Patient Mary Carrera is a 67 year old female who speaks English.   Procedure Procedure(s):  Left Total Knee Arthroplasty  - Wound Class: I-Clean   Surgeon(s) Primary: Severo Conroy MD  Assisting: Reyes Gonzales MD     Allergies   Allergen Reactions     No Known Allergies        Isolation  [unfilled]    Past Medical History   has a past medical history of Arthritis; Chronic depressive personality disorder; Hearing loss; and Other and unspecified alcohol dependence, in remission. She also has no past medical history of Complication of anesthesia; History of blood transfusion; or PONV (postoperative nausea and vomiting).    Anesthesia General   Dermatome Level     Preop Meds acetaminophen (Tylenol) - time given: 0813  celecoxib (Celebrex) - time given: 0814  Tramadol - time given: 0814   Nerve block Not applicable   Intraop Meds fentanyl (Sublimaze):  250 mcg total  hydromorphone (Dilaudid): 1 mg total  ondansetron (Zofran): last given at 1000   Local Meds Yes - Local Cocktail (morphine, ropivacaine, epinephrine, Toradol) - time given: 1000   Antibiotics cefazolin (Ancef) - last given at 1100     Pain Patient Currently in Pain: denies  Comfort: negligible pain   PACU meds  Not applicable   PCA / epidural No   Capnography     Telemetry ECG Rhythm: Sinus rhythm      Labs Glucose Lab Results   Component Value Date     12/06/2016       Hgb Lab Results   Component Value Date    HGB 14.7 09/07/2017       INR No results found for: INR   PACU Imaging Completed     Wound/Incision Incision/Surgical Site 12/05/16 Right Knee (Active)   Number of days:281       Incision/Surgical Site 09/12/17 Anterior;Left;Midline Knee (Active)   Incision Assessment UTV 9/12/2017 12:29 PM   Closure Sutures;Liquid bandage 9/12/2017 11:06 AM   Dressing Intervention Clean, dry, intact 9/12/2017 12:29 PM   Number of days:0       Incision/Surgical Site (Active)   Number of days:87419      CMS  All Extremities Temperature: warm  LLE Temperature: warm  LLE Color: no discoloration  LLE Sensation: no numbness   Equipment ice pack and continuous passive motion machine (CPM)   Other LDA       IV Access Peripheral IV 09/12/17 Right Hand (Active)   Site Assessment WDL 9/12/2017 12:29 PM   Line Status Infusing 9/12/2017 11:35 AM   Phlebitis Scale 0-->no symptoms 9/12/2017 11:35 AM   Infiltration Scale 0 9/12/2017 11:35 AM   Extravasation? No 9/12/2017 11:35 AM   Dressing Intervention New dressing  9/12/2017  8:19 AM   Number of days:0      Blood Products Not applicable EBL surg log  200 EBL   Intake/Output Date 09/12/17 0700 - 09/13/17 0659   Shift 6795-6245 6231-7903 8674-7698 24 Hour Total   I  N  T  A  K  E   P.O. 100   100    I.V. 1000   1000    Shift Total  (mL/kg) 1100  (10.24)   1100  (10.24)   O  U  T  P  U  T   Blood 200   200    Shift Total  (mL/kg) 200  (1.86)   200  (1.86)   Weight (kg) 107.4 107.4 107.4 107.4        Drains / Read Closed/Suction Drain 1 Left Knee Accordion 10 [place] (Active)   Number of days:0      Time of void PreOp Void Prior to Procedure: 0600 (Pt does not have to void at this time) (09/12/17 0830)    PostOp     Bladder Scan      mL (09/12/17 1228)  tolerating sips     Vitals    B/P: (!) 148/93  T: 98.1  F (36.7  C)    Temp src: Axillary  P:  Pulse: 77 (09/12/17 0749)    Heart Rate: 87 (09/12/17 1215)     R: 12  O2:  SpO2: 93 %    O2 Device: BiPAP/CPAP    Oxygen Delivery: 12 LPM         Family/support present yes   Patient belongings Patient Belongings: suitcase  Disposition of Belongings: labeled   Patient transported on bed   DC meds/scripts (obs/outpt) Not applicable     Special needs/considerations None   Tasks needing completion None       Nicholas Rivera RN  ASCOM [id number]

## 2017-09-12 NOTE — OP NOTE
PREOPERATIVE DIAGNOSIS: Degenerative arthritis, left knee.  POSTOPERATIVE DIAGNOSIS: Same as pre-op.  PROCEDURE: left Total Knee Arthroplasty  DATE OF SURGERY: 9/12/2017   ASSISTANTS:   Reyes Gonzales MD    COMPONENTS USED:  1. Burke Persona Size 6 PS Femoral Component  2. Burke Persona Size E Tibial Component  3. Burke Size 35 Patellar Button    4. Burke Persona PS Polyethylene Liner, Size 10    ANESTHESIA: General   COMPLICATIONS: None  EBL: 200cc    SECONDARY DIAGNOSES:   Body mass index is 40.64 kg/(m^2).      FINDINGS: Preop ROM under anesthesia: 7 to 120.  Cartilage loss, sclerotic bone, and osteophyte formation of the medial, lateral and patellofemoral compartment.  Femoral, tibial, and patellar components cemented into place without complication.  Following placement of the polyethylene liner the knee appeared to be in neutral alignment, have full extension, flexion to 120, good patellar tracking, and stable to to anterior and posterior stress in flexion and varus/valgus stress in extension, 30 degrees, and 90 degrees.  The wound was closed without tension.      INDICATIONS: Mary Carrera is a 67 year old female with longstanding history of left knee pain.  She recently underwent right TKA and has an excellent result.  The patient was recently seen in clinic and found to have degenerative arthritis of the left knee.  The patient was followed and noted to have failed conservative treatment options. Radiographs and preoperative clinical symptoms are consistent with degenerative arthritis as the cause of the patient's pain.   We discussed the risks, benefits, and alternatives to total knee arthroplasty with the patient.  Please see clinic note for full details of the preoperative discussion.  Following that the discussion, the patient elected to proceed with total knee arthroplasty.      DESCRIPTION OF PROCEDURE: We met the patient in the preoperative area.  There we again reviewed the risks, benefits,  and alternatives to total knee arthroplasty.  Informed written consent was obtained.  The operative left knee was marked with an indelible marker after patient confirmation of the operative site.  All the patient's questions were answered.  The patient was taken to the operating room and underwent induction of  General  anesthesia.  The patient was placed on the operating table in the supine position.  Bony prominences were well padded and the patient was secured to the table in the standard fashion.  An SCD was placed on the nonoperative leg.   A tourniquet was placed on the operative thigh and the patient was prepped and draped in the normal sterile fashion.       A timeout was performed in which the patient's name, MRN, imaging, preoperative plan and laterality was reviewed.  We also confirmed that the patient received the appropriate preoperative IV antibiotics and 1 gm of TXA.  The tourniquet was inflated with the use of an esmarch.         A standard midline incision was made. Dissection was carried down to the knee retinaculum and the quadriceps tendon. A standard medial parapatellar arthrotomy was performed. Subperiosteal dissection was carried out along the medial proximal tibia. The fat pad was removed.  Care was taken to protect the patellar tendon and extensor mechanism during fat pad removal. The tissue along the anterior aspect of the distal femur was also removed.  The patella was subluxed and the knee was flexed.     The ACL and PCL were released.  A drill was advanced down the femoral canal in a retrograde direction. The sword was set at 6 degrees of valgus in accordance with the preoperative plan and was advanced into the canal.  The distal femoral cutting block was then placed and pinned.  An addition 1mm of distal femur was resected given her preoperative flexion contracture.  The hermila was removed and the distal femur cuts were made medially and laterally.  The cutting block was removed and the  hermila with the alignment piece was inserted into the canal to ensure proper angle of the cut and a flat cut.      The posterior referencing femoral sizing block was used and the femur was measured to be a size 6.  Two holes were drilled to obtain 3 degrees of external rotation with respect to the posterior condylar axis of the femur.  The femoral cutting block was then slid over the pins and fixed onto the bone.  The anterior cut, posterior cut, anterior and posterior chamfer cuts were made.  The block was then removed and excess bone fragments were removed.     Attention was then directed towards the proximal tibia. The meniscus and soft tissue was removed to expose the medial and lateral tibial plateau. Retractors were placed around the knee to obtain good visualization, then the extramedullary tibial alignment guide was placed in the appropriate position. The 2-10 guide was used to select the resection level of the tibia and the cutting guide was pinned into position. With care directed towards preserving the posterior nerves and blood vessels and the medial collateral ligament, the saw was used to cut the proximal tibia.     Lamina spreaders were used to visualize the posterior knee. A curved osteotome was used to remove posterior osteophytes. Remnants of meniscus were also removed. Our anesthetic injection was then introduced through the posterior capsule with extreme care directed towards not injuring the posterior neurovascular structures.  The posterolateral capsular tissue was avoided.  The sizing blocks were used to measure and confirm appropriate sizing of the flexion and extension gaps.  The flexion and extension gap was slightly tight with the sizing block and an additional 2 mm of bone was resected.  After this the gaps were appropriate and symmetric with the 12 block in place.    The proximal tibia was again exposed and sized. The tibia was measured to be a size E.   The tibial trial was placed into  proper rotation and pinned into place.  The drop hermila was used to confirm proper alignment of the cut.    The femoral trial was placed and the box cutting guide was used and the notch cut was performed. The excess bone was removed.  The lug holes were drilled.  The trial liner was placed. The knee was found to have excellent range of motion from full extension to 120 of flexion and was stable to anterior and posterior stress in flexion and extension as well as varus/valgus stress in 0, 30, and 90 degrees of flexion.     The patella was then exposed.  Soft tissues were removed around the patella for visualization. The patella thickness was measured with a caliper to be 22, and a measured resection of 9 mm was made.  A caliper was then used to measure the residual thickness of the patella to be 13.  The patella was measured with the lollipops and found to be a size 35. The drill guide was placed and the three lug holes were drilled. The patella trial was then placed and the knee was ranged. The patella was found to track well.      The patellar and femoral trials were removed and the final tibial preparation was done. The stove pipe was used to guide the drill, and then the wing punch was used. The tibial trial was then removed.     A bone plug was placed into the femur and then pulsitile lavage was used to clean the bone in preparation for cementing. The bone was dried. Cement was mixed, and then all the components were cemented into place. While the cement was hardening, the remainder of the anesthetic injection was spread around the deep retinacular layer and the subcutaneous layer with a spinal needle.   The knee was irrigated with betadine lavage for approximately 3 minutes.  Once the cement had hardened, the excess cement was removed.  The tourniquet was released.  A second gram of TXA was given.  Total tourniquet time was 69 minutes.  Hemostasis was obtained.      Various trial liners were then used. The 10 mm  liner fit best and the knee had full extension to 120 degrees and was stable to anterior and posterior stress in flexion and extension as well as varus/valgus stress in 0, 30, and 90 degrees of flexion.  The trial liner was removed and the real liner was placed.      The knee was again copiously irrigated.  A deep drain was used.  Closure was performed with a #1 symmetric stratafix in the retinacular layer, 2-0 stratafix spiral in the  subcutaneous tissues, and monocryl in the skin.  A sterile dressing was applied.  The patient was then awakened, transferred to the [facility], and brought to the recovery room in stable condition. There were no complications.    POSTOPERATIVE PLAN:  Weight bearing: WBAT  Anticoagulation: ASA 81 mg BID and SCDs while in the hospital  Precautions: None  Pain control and monitoring  ID: Routine perioperative antibiotics

## 2017-09-12 NOTE — ANESTHESIA POSTPROCEDURE EVALUATION
Patient: Mary Carrera    Procedure(s):  Left Total Knee Arthroplasty  - Wound Class: I-Clean    Diagnosis:Osteoarthritis Left Knee   Diagnosis Additional Information: No value filed.    Anesthesia Type:  General Anesthesia    Note:  Anesthesia Post Evaluation    Patient location during evaluation: PACU  Patient participation: Able to fully participate in evaluation  Level of consciousness: awake  Pain management: adequate  Airway patency: patent  Cardiovascular status: acceptable  Respiratory status: acceptable  Hydration status: acceptable  PONV: none     Anesthetic complications: None          Last vitals:  Vitals:    09/12/17 1200 09/12/17 1215 09/12/17 1230   BP: (!) 187/118 (!) 148/93 (!) 149/92   Pulse:      Resp: 15 12 29   Temp:  36.7  C (98.1  F)    SpO2: 95% 93% 93%         Electronically Signed By: Swapna Davis MD  September 12, 2017  12:47 PM

## 2017-09-12 NOTE — ANESTHESIA PREPROCEDURE EVALUATION
Anesthesia Evaluation     .             ROS/MED HX    ENT/Pulmonary:     (+)sleep apnea, MANNIE risk factors obese, , . .    Neurologic:  - neg neurologic ROS     Cardiovascular:     (+) Dyslipidemia, ----. : . . . :. .       METS/Exercise Tolerance:     Hematologic:         Musculoskeletal:         GI/Hepatic:  - neg GI/hepatic ROS       Renal/Genitourinary:  - ROS Renal section negative       Endo:  - neg endo ROS   (+) Obesity, .      Psychiatric:     (+) psychiatric history depression      Infectious Disease:         Malignancy:         Other:                                    Anesthesia Plan      History & Physical Review      ASA Status:  3 .    NPO Status:  > 8 hours    Plan for Spinal (backup General if spinal attempt unsuccessful) with Intravenous induction. Maintenance will be Balanced.    PONV prophylaxis:  Ondansetron (or other 5HT-3) and Dexamethasone or Solumedrol       Postoperative Care      Consents                          .

## 2017-09-13 ENCOUNTER — APPOINTMENT (OUTPATIENT)
Dept: PHYSICAL THERAPY | Facility: CLINIC | Age: 67
DRG: 470 | End: 2017-09-13
Attending: ORTHOPAEDIC SURGERY
Payer: COMMERCIAL

## 2017-09-13 ENCOUNTER — APPOINTMENT (OUTPATIENT)
Dept: OCCUPATIONAL THERAPY | Facility: CLINIC | Age: 67
DRG: 470 | End: 2017-09-13
Attending: ORTHOPAEDIC SURGERY
Payer: COMMERCIAL

## 2017-09-13 VITALS
SYSTOLIC BLOOD PRESSURE: 112 MMHG | BODY MASS INDEX: 40.42 KG/M2 | TEMPERATURE: 97.9 F | DIASTOLIC BLOOD PRESSURE: 56 MMHG | HEART RATE: 77 BPM | OXYGEN SATURATION: 94 % | HEIGHT: 64 IN | RESPIRATION RATE: 16 BRPM | WEIGHT: 236.77 LBS

## 2017-09-13 LAB
ANION GAP SERPL CALCULATED.3IONS-SCNC: 7 MMOL/L (ref 3–14)
BUN SERPL-MCNC: 18 MG/DL (ref 7–30)
CALCIUM SERPL-MCNC: 7.9 MG/DL (ref 8.5–10.1)
CHLORIDE SERPL-SCNC: 109 MMOL/L (ref 94–109)
CO2 SERPL-SCNC: 25 MMOL/L (ref 20–32)
CREAT SERPL-MCNC: 0.72 MG/DL (ref 0.52–1.04)
GFR SERPL CREATININE-BSD FRML MDRD: 80 ML/MIN/1.7M2
GLUCOSE SERPL-MCNC: 120 MG/DL (ref 70–99)
HGB BLD-MCNC: 10.7 G/DL (ref 11.7–15.7)
POTASSIUM SERPL-SCNC: 3.8 MMOL/L (ref 3.4–5.3)
SODIUM SERPL-SCNC: 141 MMOL/L (ref 133–144)

## 2017-09-13 PROCEDURE — 40000133 ZZH STATISTIC OT WARD VISIT: Performed by: OCCUPATIONAL THERAPIST

## 2017-09-13 PROCEDURE — 97116 GAIT TRAINING THERAPY: CPT | Mod: GP | Performed by: PHYSICAL THERAPIST

## 2017-09-13 PROCEDURE — 99207 ZZC CDG-MDM COMPONENT: MEETS LOW - DOWN CODED: CPT | Performed by: INTERNAL MEDICINE

## 2017-09-13 PROCEDURE — 25000132 ZZH RX MED GY IP 250 OP 250 PS 637: Performed by: INTERNAL MEDICINE

## 2017-09-13 PROCEDURE — 25000128 H RX IP 250 OP 636: Performed by: ORTHOPAEDIC SURGERY

## 2017-09-13 PROCEDURE — 97165 OT EVAL LOW COMPLEX 30 MIN: CPT | Mod: GO | Performed by: OCCUPATIONAL THERAPIST

## 2017-09-13 PROCEDURE — 99231 SBSQ HOSP IP/OBS SF/LOW 25: CPT | Performed by: INTERNAL MEDICINE

## 2017-09-13 PROCEDURE — 36415 COLL VENOUS BLD VENIPUNCTURE: CPT | Performed by: ORTHOPAEDIC SURGERY

## 2017-09-13 PROCEDURE — 97110 THERAPEUTIC EXERCISES: CPT | Mod: GP | Performed by: PHYSICAL THERAPIST

## 2017-09-13 PROCEDURE — 85018 HEMOGLOBIN: CPT | Performed by: ORTHOPAEDIC SURGERY

## 2017-09-13 PROCEDURE — 80048 BASIC METABOLIC PNL TOTAL CA: CPT | Performed by: ORTHOPAEDIC SURGERY

## 2017-09-13 PROCEDURE — 40000193 ZZH STATISTIC PT WARD VISIT: Performed by: PHYSICAL THERAPIST

## 2017-09-13 PROCEDURE — 25000132 ZZH RX MED GY IP 250 OP 250 PS 637: Performed by: ORTHOPAEDIC SURGERY

## 2017-09-13 RX ORDER — AMOXICILLIN 250 MG
1-2 CAPSULE ORAL 2 TIMES DAILY
Qty: 56 TABLET | Refills: 0 | Status: SHIPPED | OUTPATIENT
Start: 2017-09-13 | End: 2017-09-27

## 2017-09-13 RX ORDER — OXYCODONE HYDROCHLORIDE 5 MG/1
5-10 TABLET ORAL EVERY 4 HOURS PRN
Qty: 80 TABLET | Refills: 0 | Status: SHIPPED | OUTPATIENT
Start: 2017-09-13 | End: 2018-01-25

## 2017-09-13 RX ORDER — OXYCODONE HYDROCHLORIDE 5 MG/1
5-10 TABLET ORAL
Qty: 120 TABLET | Refills: 0 | Status: SHIPPED | OUTPATIENT
Start: 2017-09-13 | End: 2017-09-13

## 2017-09-13 RX ORDER — IBUPROFEN 800 MG/1
800 TABLET, FILM COATED ORAL EVERY 12 HOURS PRN
Qty: 60 TABLET | Refills: 1 | Status: SHIPPED | OUTPATIENT
Start: 2017-09-13

## 2017-09-13 RX ADMIN — ACETAMINOPHEN 975 MG: 325 TABLET, FILM COATED ORAL at 14:51

## 2017-09-13 RX ADMIN — ACETAMINOPHEN 975 MG: 325 TABLET, FILM COATED ORAL at 05:12

## 2017-09-13 RX ADMIN — OXYCODONE HYDROCHLORIDE 10 MG: 5 TABLET ORAL at 14:51

## 2017-09-13 RX ADMIN — OXYCODONE HYDROCHLORIDE 5 MG: 5 TABLET ORAL at 02:40

## 2017-09-13 RX ADMIN — OXYCODONE HYDROCHLORIDE 10 MG: 5 TABLET ORAL at 05:44

## 2017-09-13 RX ADMIN — SENNOSIDES AND DOCUSATE SODIUM 2 TABLET: 8.6; 5 TABLET ORAL at 08:05

## 2017-09-13 RX ADMIN — OXYCODONE HYDROCHLORIDE 10 MG: 5 TABLET ORAL at 11:28

## 2017-09-13 RX ADMIN — ASPIRIN 81 MG: 81 TABLET, COATED ORAL at 08:05

## 2017-09-13 RX ADMIN — CEFAZOLIN SODIUM 2 G: 2 INJECTION, SOLUTION INTRAVENOUS at 02:37

## 2017-09-13 NOTE — DISCHARGE SUMMARY
ORTHOPAEDIC DISCHARGE SUMMARY     Date of Admission: 9/12/2017  Date of Discharge: 9/13/2017  3:05 PM  Disposition: Home  Staff Physician: Severo Conroy MD  Primary Care Provider: Lynn Lim    DISCHARGE DIAGNOSIS:  Osteoarthritis Left Knee     PROCEDURES: Procedure(s):  ARTHROPLASTY KNEE LEFT on 9/12/2017    BRIEF HISTORY:  Mary Carrera is a 67 year old female with longstanding history of left knee pain.  She recently underwent right TKA and has an excellent result.  The patient was recently seen in clinic and found to have degenerative arthritis of the left knee.  The patient was followed and noted to have failed conservative treatment options. Radiographs and preoperative clinical symptoms are consistent with degenerative arthritis as the cause of the patient's pain.   We discussed the risks, benefits, and alternatives to total knee arthroplasty with the patient.  Please see clinic note for full details of the preoperative discussion.  Following that the discussion, the patient elected to proceed with total knee arthroplasty.      HOSPITAL COURSE:    Surgery was uncomplicated. Mary Carrera has done well post-operatively. Medicine was consulted post operatively to aid in management of medical comorbidities. See final recommendations below. The patient received routine nursing cares and is medically stable. Vital signs are stable. The patient is tolerating a regular diet without GI distress/nausea or vomiting. Voiding spontaneously. All PT/OT goals have been met for safe mobility. Pain is now controlled on oral medications which will be available on discharge. Stool softeners have been used while taking pain medications to help prevent constipation. Mary Carrera is deemed medically safe to discharge.     Antibiotics:  Ancef given periop and 24 hours postop.  PT Progress:  Has met PT/OT goals for safe mobility.   Pain Meds:  Weaned off all IV pain meds by discharge.  Inpatient Events:  No  significant events or complications.     Discharge orders and instructions as below.    FOLLOWUP:    Future Appointments  Date Time Provider Department Center   9/13/2017 10:00 AM Fannie Fischer, PT [facility] [place]   9/13/2017 10:30 AM Bere Stanford OT [facility] [place]   9/13/2017 2:15 PM Priyanka Li, PT [facility]       Appointments on [facility]. Call [phone] if you haven't heard regarding these appointments within 7 days of discharge.    PLANNED DISCHARGE ORDERS:     DVT Prophylaxis: Aspirin 81mg BID x4wk        Discharge Medication List as of 9/13/2017  2:41 PM      START taking these medications    Details   aspirin EC 81 MG EC tablet Take 1 tablet (81 mg) by mouth 2 times daily for 28 days, Disp-56 tablet, R-0, E-Prescribe      senna-docusate (SENOKOT-S;PERICOLACE) 8.6-50 MG per tablet Take 1-2 tablets by mouth 2 times daily for 14 days, Disp-56 tablet, R-0, E-Prescribe      !! ibuprofen (ADVIL/MOTRIN) 800 MG tablet Take 1 tablet (800 mg) by mouth every 12 hours as needed for moderate pain, Disp-60 tablet, R-1, E-Prescribe       !! - Potential duplicate medications found. Please discuss with provider.      CONTINUE these medications which have CHANGED    Details   oxyCODONE (ROXICODONE) 5 MG IR tablet Take 1-2 tablets (5-10 mg) by mouth every 4 hours as needed for moderate to severe pain, Disp-80 tablet, R-0, Local Print         CONTINUE these medications which have NOT CHANGED    Details   sertraline (ZOLOFT) 100 MG tablet Take 1 tablet (100 mg) by mouth daily, Disp-90 tablet, R-3, E-Prescribe      order for DME Equipment ordered: RESMED Auto PAP Mask type: Nasal Settings: 8-16 CM [id number]      acetaminophen (TYLENOL) 325 MG tablet Take 2 tablets (650 mg) by mouth every 4 hours as needed for other (surgical pain), Disp-100 tablet, R-0, E-Prescribe      multivitamin  with lutein (OCUVITE WITH LTEIN) CAPS Take 1 capsule by mouth daily, Historical      !!  ibuprofen (ADVIL,MOTRIN) 800 MG tablet Take 800 mg by mouth, Historical      hydrocortisone (WESTCORT) 0.2 % cream Apply topically 2 times dailyDisp-45 g, [id number]      PREVIDENT 5000 BOOSTER PLUS 1.1 % PSTE R-2, SHARON, Historical       !! - Potential duplicate medications found. Please discuss with provider.            Discharge Procedure Orders  Physical Therapy Referral   Referral Type: Rehab Therapy Physical Therapy     Reason for your hospital stay   Order Comments: Left total knee arthroplasty     Follow Up and recommended labs and tests   Order Comments: You are scheduled to follow up with Dr. Conroy approximately 2 weeks after your surgery.  If you were seen at [facility], your appointment will most likely be there.  If you were seen at the [facility], your appointment will likely be there.    Contact clinic if you have any questions about the date or time.     Activity   Order Comments: Your activity upon discharge: activity as tolerated, weightbearing as tolerated   Order Specific Question Answer Comments   Is discharge order? Yes      When to contact your care team   Order Comments: Temperature >101.5 sustained for >2 hours, increased drainage, or shortness of breath.     Discharge Instructions   Order Comments: Surgery: Left Total Knee Replacement on 9/12/17.    If you have any questions contact Dr. Conroy at the following numbers: if you see Dr. Conroy at [facility] call [phone].  If you see him at [facility] [place] call [phone].      Follow up appointment:   You are scheduled to follow up with Dr. Conroy approximately 2 weeks after your surgery.  If you were seen at [facility], your appointment will most likely be there.  If you were seen at the [facility], your appointment will likely be there.    Contact clinic if you have any questions about the date or time.    Anticoagulation:   Take the aspirin 81mg tabs twice daily prescribed for the total of 28 days.  This is given to help  "minimize your risk of blood clot.    Activity:   -Continue to weight bear on your operative leg as tolerated by pain.  As you are more comfortable, you can discontinue use of the walker and transition to a cane.  Once you are comfortable with the cane, you can also wean from the cane and progress to using no assistive devices.  Do not transition until you are comfortable and have good balance.      -Continue to work on knee range of motion to prevent stiffness.      -When you are sitting, \"bridge the leg\" and keep the leg fully straight, with a bump under the heel to prevent a flexion contracture and ensure that you can fully straighten the knee.    -Work on getting your leg fully straight while walking - landing on your heel and progressing over the knee normally to prevent a flexion contracture.        Pain Medications:   -Take pain medications as prescribed.  Wean off the the narcotic pain medications (Oxycodone, Dilaudid, etc) as tolerated by pain.  Only take the narcotic pain medication if not relieved by the other medications.  To help with this, take the Tylenol and Celebrex (if prescribed) to minimize the amount of narcotic pain medication you need to take.      -Do not drive while on pain medications or until your leg feels well enough to do so.      Bandage Care and Showering:   -You can shower with the adhesive bandage covering your knee at the time of discharge.    -Remove the adhesive bandage 10 days after your surgery.  This can be removed at home.  Once removed, it is common to have a few scabs.  Let the scabs fall off on their own - they will do so over the next week or two.  The sutures are resorbable and nothing needs to be removed.    --Once the bandage is removed, you can shower without covering the incision.  Do not soak or bathe the incision in water.  Do not scrub the incision.  Just let the water from the shower run over the incision.    --Contact Dr. Conroy or his staff if there is any " drainage from the incision or redness.    Leg Swelling and Icing  -Continue icing the knee 5-6 times per day for approximately 20 minutes each time.  This will help with swelling.    -Some swelling in the leg is normal after surgery.  This type of swelling is usually gravity dependent and is improved in the morning after sleeping.  If the swelling is painful in the calf or the swelling is getting worse, contact Dr. Conroy's office.  We may recommend presenting to the Emergency Department to obtain an ultrasound of the leg if there is concern for a DVT.      -Elevate the leg if you are sitting as this will help reduce swelling.    Contact clinic if you note any of the following:  -Fevers greater than 101.5 chills  -Increased pain, redness, swelling or discharge at the surgical site.   -During regular business hours call Dr Conroy's office and request to speak with his nurse or the triage nurses. If you see him at [facility] call [phone]. If you see him at [facility] call [phone].   -After hours or on weekends call the hospital  at [phone] and ask to speak with the Orthopaedic resident on call.     Diet   Order Comments: Follow this diet upon discharge: Regular   Order Specific Question Answer Comments   Is discharge order? Yes      Assign Questionnaire Series to Patient           Jaime Dickson MD  Orthopaedic Surgery PGY-4  Pager:  [phone]

## 2017-09-13 NOTE — PLAN OF CARE
Problem: Goal Outcome Summary  Goal: Goal Outcome Summary  Outcome: Improving  Patient A/Ox4. VSS. Denies CP, SOB, dizziness/LH. LSCTA. +fl/BS. Voiding with out difficulty adequate amount. CMS intact. Dressing to knee CDI, ice applied. Tolerating regular diet without NV. IS encouraged. Activity up to bathroom using walker and SBA. IV SL. Pain rated comfortably manageable throughout shift, managed with oxycodone po PRN. Patient has demonstrated ability to call appropriately. Patient is resting with call light within reach. Will continue to monitor.

## 2017-09-13 NOTE — PLAN OF CARE
Problem: Goal Outcome Summary  Goal: Goal Outcome Summary  Discharge Planner OT   Patient plan for discharge: Home with family  Current status: OT eval only completed.  Patient able to complete LB dressing, toilet transfer, bed mobility, tub transfer and ambulation 200' with walker to/from tub room with SBA.  Patient lives with adult daughter's family, no stairs to complete, and had R TKA in December 2016.  Barriers to return to prior living situation: None  Recommendations for discharge: Home with family  Rationale for recommendations: No further home OT needs.       Entered by: Bere Stanford 09/13/2017 10:36 AM

## 2017-09-13 NOTE — PROGRESS NOTES
09/13/17 1027   Quick Adds   Type of Visit Initial Occupational Therapy Evaluation   Living Environment   Lives With child(gaudencio), adult   Living Arrangements house   Number of Stairs to Enter Home 0   Number of Stairs Within Home 0   Transportation Available car;family or friend will provide   Living Environment Comment Tub/shower, higher toilet, grab bars, shower chair   Self-Care   Usual Activity Tolerance good   Current Activity Tolerance moderate   Regular Exercise no   Equipment Currently Used at Home none   Activity/Exercise/Self-Care Comment Patient independent in ADLs/mobility, had R TKA in December.   Functional Level Prior   Ambulation 0-->independent   Transferring 0-->independent   Toileting 0-->independent   Bathing 0-->independent   Dressing 0-->independent   Eating 0-->independent   Communication 0-->understands/communicates without difficulty   Swallowing 0-->swallows foods/liquids without difficulty   Cognition 0 - no cognition issues reported   Fall history within last six months no   Prior Functional Level Comment Patient independent in ADLs/mobility, had R TKA in December.   General Information   Onset of Illness/Injury or Date of Surgery - Date 09/12/17   Referring Physician Dr. Conroy   Patient/Family Goals Statement return home to baseline   Additional Occupational Profile Info/Pertinent History of Current Problem POD #1 s/p L TKA   Precautions/Limitations no known precautions/limitations   Weight-Bearing Status - LLE weight-bearing as tolerated   General Observations On RA, alert.   Cognitive Status Examination   Cognitive Comment No cognitive concerns   Sensory Examination   Sensory Comments No N/T noted   Pain Assessment   Patient Currently in Pain Yes, see Vital Sign flowsheet   Range of Motion (ROM)   ROM Comment B UE AROM WFL   Strength   Strength Comments B UE MMT WFL   Mobility   Bed Mobility Bed mobility skill: Sit to supine;Bed mobility skill: Supine to sit   Bed Mobility Skill: Sit  "to Supine   Level of Anderson: Sit/Supine independent   Bed Mobility Skill: Supine to Sit   Level of [place]: Supine/Sit independent   Transfer Skill: Bed to Chair/Chair to Bed   Level of Anderson: Bed to Chair stand-by assist   Assistive Device - Transfer Skill Bed to Chair Chair to Bed Rehab Eval standard walker   Transfer Skill: Sit to Stand   Level of [place]: Sit/Stand stand-by assist   Assistive Device for Transfer: Sit/Stand standard walker   Transfer Skill: Toilet Transfer   Level of Anderson: Toilet stand-by assist   Assistive Device standard walker   Upper Body Dressing   Level of Anderson: Dress Upper Body independent   Lower Body Dressing   Level of [place]: Dress Lower Body stand-by assist   Toileting   Level of Anderson: Toilet stand-by assist   Grooming   Level of Anderson: Grooming independent   Eating/Self Feeding   Level of Anderson: Eating independent   Activities of Daily Living Analysis   Impairments Contributing to Impaired Activities of Daily Living pain;post surgical precautions;ROM decreased   Clinical Impression   Criteria for Skilled Therapeutic Interventions Met evaluation only   Anticipated Discharge Disposition Home with Assist   Risks and Benefits of Treatment have been explained. Yes   Patient, Family & other staff in agreement with plan of care Yes   [facility] TM \"6 Clicks\"   2016, Trustees of [facility], under license to [employer].  All rights reserved.   6 Clicks Short Forms Daily Activity Inpatient Short Form   [facility]  \"6 Clicks\" Daily Activity Inpatient Short Form   1. Putting on and taking off regular lower body clothing? 3 - A Little   2. Bathing (including washing, rinsing, drying)? 4 - None   3. Toileting, which includes using toilet, bedpan or urinal? 4 - None   4. Putting on and taking off regular upper body clothing? 4 - None   5. Taking care of personal grooming such as brushing teeth? 4 " - None   6. Eating meals? 4 - None   Daily Activity Raw Score (Score out of 24.Lower scores equate to lower levels of function) 23   Total Evaluation Time   Total Evaluation Time (Minutes) 10

## 2017-09-13 NOTE — PROGRESS NOTES
Pt feels well  Pain is controlled  No chest pain or SOB    VSS  02 sats upper 80s RA on CPAP, improved to mid 90s on low flow 02 with CPAP.  Pt does not normally need 02 with CPAP    Alert, fully oriented  Lungs clear  CV rrr  Abd soft  No L calf edema      Hgb 10.7      Assessment    S/p L TKA, Pt is doing well    Acute BL anemia, mild, asymptomatic    MANNIE on CPAP with mild hypoxia with CPAP on RA, unclear if this is chronic or acute need.  No current resp symptoms    Plan  Repeat Hgb in am  Continue to document 02 sats on RA with CPAP  ASA for DVT proph

## 2017-09-13 NOTE — PROGRESS NOTES
"       Interval History:     Did well overnight. Pain well controlled. No concerns at present. PT anticipates discharge home when ready.         Physical Exam:     Vital Signs:  /53  Pulse 77  Temp 98.6  F (37  C) (Oral)  Resp 16  Ht 1.626 m (5' 4\")  Wt 107.4 kg (236 lb 12.4 oz)  LMP 10/15/2003  SpO2 90%  BMI 40.64 kg/m2    Gen:    No acute distress. Alert.  Pulm:  Non-labored breathing  Incision:  Dressing c/d/i    Operative extremity:   Motor: ehl/fhl/gs/at intact  Sensory: intact light touch dp/sp/t/s/s  Vascular: 2+ DP pulse    Drain:  220/350   serous      Labs:  CBC    Recent Labs  Lab 09/07/17  1333   HGB 14.7              Assessment and Plan:    Mary Carrera is a 67 year old female who is s/p left TKA on 9/12/2017.     Activity: Up with assist.  Bridge knee in extension while in bed.  Gait aids PRN.  OOB to chair for all meals.  Weight bearing status: WBAT  Antibiotics/Tetanus: Routine post operative antibiotics x 2 doses.  Diet: Begin with clear fluids and progress diet as tolerated.  DVT prophylaxis: SCDs while in hospital, 81 mg aspirin BID x4 weeks total.  Bracing/Splinting: No brace or splint needed.  Ice: Ice operative site 20 minutes >4 times daily.  Wound Care:Aquacel x 10 days, Ace PRN  Drains: Follow drain output  Pain management: transition from IV to orals as tolerated.   Physical Therapy/Occupational Therapy: WB and precautions noted above, gait transfers, ROM, ADL's.   Follow-up: Clinic with Dr. Conroy around 2 weeks postoperatively for wound check and xrays as needed.    Disposition: Pending progress with therapies, pain control on orals, and medical stability, anticipate discharge to home on POD #2.      "

## 2017-09-13 NOTE — PLAN OF CARE
Problem: Knee Replacement, Total (Adult)  Goal: Signs and Symptoms of Listed Potential Problems Will be Absent or Manageable (Knee Replacement, Total)  Signs and symptoms of listed potential problems will be absent or manageable by discharge/transition of care (reference Knee Replacement, Total (Adult) CPG).   Outcome: Improving            VS:    VSS.    Output:    Urine output adequate, up to the bathroom x2 this shift.   Activity:    Up with stand-by assist and walker. WBAT on LLE.   Skin: Intact, incision.    Pain:    Pain well managed with 5mg of Oxycodone q3-4 hours. Pt states she cannot believe how much less the pain is with this knee replacement than the last time/other knee replacement.   CMS:    Intact   Dressing:    CDI   Diet:    Advanced to regular diet, well tolerated.    LDA:    PIV in right hand, SL.  Hemovac - bag changed this shift.   Equipment:    PCDs, CPAP   Plan:    Continue to monitor.   Additional Info:    Capnography was on for a few hours this evening, otherwise pt was wearing CPAP.

## 2017-09-13 NOTE — PROGRESS NOTES
Care Coordinator- Discharge Planning     Admission Date/Time:  9/12/2017  Attending MD:  Severo Conroy MD     Data  Date of initial CC assessment:  9/13/2017  Chart reviewed, discussed with interdisciplinary team.   Patient was admitted for:   1. S/P total knee arthroplasty, left         Assessment  Concerns with insurance coverage for discharge needs: None.  Current Living Situation: Patient lives alone.  Support System: Family and Friends  Services Involved: Outpatient Rehab  Transportation: Family or Friend will provide  Barriers to Discharge: None        Coordination of Care and Referrals: Provided patient/family with options for Outpatient Rehab. A referral was sent to [facility], [address] [place] [address] [address] [place], phone [phone], fax [phone], per patient request. No further discharge concerns at this time. All therapy orders complete. CC to follow as needed.        Plan  Anticipated Discharge Date:  9/13/2017  Anticipated Discharge Plan:  Home with Outpatient PT    CTS Handoff completed:  YES    Doris Brown RN, BSN  Care Coordinator, 8A  Phone [phone]  Pager [phone]

## 2017-09-13 NOTE — PLAN OF CARE
Problem: Goal Outcome Summary  Goal: Goal Outcome Summary  Outcome: Improving  Pt discharged home via WC accompanied with transport, discharge medication, and discharge instruction given, all belongings sent home with pt and pt understand discharge plan.

## 2017-09-13 NOTE — PLAN OF CARE
Problem: Goal Outcome Summary  Goal: Goal Outcome Summary  Pt doing well s/p TKA. She has already met goals for discharge.  ROM 5-101.  Pt would benefit from OP therapy upon discharge. No further needs.     Physical Therapy Discharge Summary     Reason for therapy discharge:    Discharged to home with outpatient therapy.  All goals and outcomes met, no further needs identified.     Progress towards therapy goal(s). See goals on Care Plan in [facility] electronic health record for goal details.  Goals met     Therapy recommendation(s):    Continued therapy is recommended.  Rationale/Recommendations:  Progress B knee ROM and strength for independent mobility without restriction.

## 2017-09-13 NOTE — PLAN OF CARE
Problem: Goal Outcome Summary  Goal: Goal Outcome Summary  Outcome: Improving  VS:     VSS.    Output:     Urine output adequate, up to the bathroom this shift.    Activity:     Up with stand-by assist and walker. WBAT on LLE.   Skin: Intact, incision.    Pain:     Pain well managed with 5-10mg of Oxycodone q3-4 hours. Pt states she cannot believe how much less the pain is with this knee replacement than the last time/other knee replacement.   CMS:     Intact   Dressing:     CDI   Diet:     Advanced to regular diet, well tolerated.    LDA:     PIV in right hand, SL.  Hemovac - bag changed this shift.   Equipment:     PCDs, CPAP   Plan:     Continue to monitor.   Additional Info:

## 2017-09-14 ENCOUNTER — CARE COORDINATION (OUTPATIENT)
Dept: CARE COORDINATION | Facility: CLINIC | Age: 67
End: 2017-09-14

## 2017-09-14 NOTE — PROGRESS NOTES
"       Interval History:     Patient doing well.  Walked with PT last night.  Minimal pain and hopeful for d/c to home today.         Physical Exam:     Vital Signs:  /56 (BP Location: Right arm)  Pulse 77  Temp 97.9  F (36.6  C) (Oral)  Resp 16  Ht 1.626 m (5' 4\")  Wt 107.4 kg (236 lb 12.4 oz)  LMP 10/15/2003  SpO2 94%  BMI 40.64 kg/m2    Intake/Output Summary (Last 24 hours) at 09/12/17 1647  Last data filed at 09/12/17 1600   Gross per 24 hour   Intake             1200 ml   Output              870 ml   Net              330 ml     Gen:    No acute distress. Alert.  Pulm:  Non-labored breathing  Incision:   Dressing c/d/i  Drain: w/ s/s output - removed.    Operative extremity:   Motor: ehl/fhl/gs/at intact  Sensory: intact light touch dp/sp/t/s/s  Vascular: 2+ DP pulse    Labs:  CBC    Recent Labs  Lab 09/13/17  0648 09/07/17  1333   HGB 10.7* 14.7          Assessment and Plan:    Mary Carrera is a 67 year old female who is s/p left TKA on 9/12/2017.     Activity: Up with assist.  Bridge knee in extension while in bed.  Gait aids PRN.  OOB to chair for all meals.  Weight bearing status: WBAT  Antibiotics/Tetanus: Routine post operative antibiotics x 2 doses.  Diet: Begin with clear fluids and progress diet as tolerated.  DVT prophylaxis: SCDs while in hospital, 81 mg aspirin BID x4 weeks total.  Bracing/Splinting: No brace or splint needed.  Ice: Ice operative site 20 minutes >4 times daily.  Wound Care:Aquacel x 10 days, Ace PRN  Drains: Follow drain output  Pain management: transition from IV to orals as tolerated.   Physical Therapy/Occupational Therapy: WB and precautions noted above, gait transfers, ROM, ADL's.   Follow-up: Clinic with Dr. Conroy around 2 weeks postoperatively for wound check and xrays as needed.    Disposition: Pending progress with therapies, pain control on orals, and medical stability, anticipate discharge to home today pending PT      "

## 2017-09-14 NOTE — PROGRESS NOTES
"Clinic Care Coordination Contact  OUTREACH    Referral Information:  Referral Source: IP/TCU Report  Reason for Contact:  CC f/u w/ pt post d/c.  Care Conference: No     Universal Utilization:   ED Visits in last year: 0  Hospital visits in last year: 2  Last PCP appointment: 09/07/17  Missed Appointments: 0  Concerns: Appropriate utilization.  Multiple Providers or Specialists: ortho, surgery, sleep medicine    Clinical Concerns:  Current Medical Concerns:   Patient Active Problem List   Diagnosis     Leiomyoma of uterus     Obesity     Benign neoplasm of cranial nerve (H)     Hearing loss     Pure hypercholesterolemia     Depression, major, recurrent, in complete remission (H)     Alopecia [facility]     ACP (advance care planning)     Osteoarthritis of right knee     MANNIE (obstructive sleep apnea)     S/P total knee arthroplasty, left   *      Clinical Pathway Name: None      Medication Management:  Pt reports no changes to her medication, other than the addition of pain medication. Pt reports pain is well managed and feels \"okay\" and only \"aches\".    Functional Status:  Mobility Status: Independent w/Device (walker)  Equipment Currently Used at Home: shower chair, walker, standard      Psychosocial:  Current living arrangement:: I live in a private home with family. Pt lives w/ daughter and family.  Financial/Insurance: Medicare/other     Resources and Interventions:  Current Resources:  (none);  (none)  PAS Number:  (N/A)  Senior Linkage Line Referral Placed:  (N/A)  Advanced Care Plans/Directives on file:: No  Referrals Placed:  (No referrals placed during this contact.)     Frequency of Care Coordination:  (N/A)  Upcoming appointment:  (No upcoming PCP appt. F/u w/ surgeon at [facility] 2 weeks.)     Plan:  Pt home after surgery and reports doing well. Pt lives w/ daughter and her family. Pt indicates no concerns w/ pain. Regarding equipment, pt states she has a regular walker and shower chair. Pt " has no services in the home, but is assisted by family. Pt plans to f/u w/ Dr. Conroy at his [facility] office location in 2 weeks and begin outpatient PT at [facility] around that time. In the meantime, d/c instructions indicated activity as tolerated and pt plans to start some exercises that she knows from her after her previous surgery, as she can. Pt reports no needs or concerns at this time, knows to call Dr. Conroy or PCP w/ any concerns or questions that arise. No further [facility] outreach planned.    Henny Amaro, JEANMARIE, BRIGIDO  Social Work Care Coordinator  [phone]  [email or web address]

## 2018-01-25 ENCOUNTER — OFFICE VISIT (OUTPATIENT)
Dept: SLEEP MEDICINE | Facility: CLINIC | Age: 68
End: 2018-01-25
Payer: MEDICARE

## 2018-01-25 VITALS
OXYGEN SATURATION: 96 % | SYSTOLIC BLOOD PRESSURE: 128 MMHG | DIASTOLIC BLOOD PRESSURE: 73 MMHG | WEIGHT: 226 LBS | BODY MASS INDEX: 37.65 KG/M2 | HEIGHT: 65 IN | RESPIRATION RATE: 14 BRPM | HEART RATE: 86 BPM

## 2018-01-25 DIAGNOSIS — G47.33 OSA (OBSTRUCTIVE SLEEP APNEA): Primary | ICD-10-CM

## 2018-01-25 PROCEDURE — 99214 OFFICE O/P EST MOD 30 MIN: CPT | Performed by: FAMILY MEDICINE

## 2018-01-25 NOTE — PROGRESS NOTES
"[facility]  Sleep Medicine Follow Up Note  January 25, 2018        Mary Carrera MRN# [id number]   Age: 66 year old YOB: 1950      Date of Follow Up: January 25, 2018     Primary care provider: Lynn Lim      History taken from: Patient     Mary Carrera is a 66 year old female seen in the Sleep Clinic for        Chief Complaint   Patient presents with     RECHECK       F/u Psg 1/23      Assessment and Plan:   Diagnoses:  (1) Moderate MANNIE (AHI was 29.7 events per hour)     Discussion:     (1) Moderate MANNIE: Pt has moderate to severe MANNIE with an AHI of 29.7 events per hour. The patient was placed on an APAP at minimum pressure of 9 cmH2O and maximum pressure of 18 cmH2O during the prior visits. Today, the PAP download shows that the patient is compliant with the APAP treatment and this effective at treating the condition with a residual AHI of 29.7 events per hour. The patient also reports subjective improvement of sxs with the PAP therapy. Given that the patient does not have any contraindications for APAP therapy, the patient will be kept on current therapy. Auto-CPAP compliance was discussed. Pt was encouraged to use the APAP every time she sleeps. Lifestyle changes with diet and exercises were encouraged. Pt will follow up within one year.     Patient understands and agrees with assessment and plan. All questions and concerns were addressed. Treatments were discussed along with their potential adverse interactions and possible side effects.     Pt was asked to not operate any heavy machinery, work at heights, or engage in life-threatening activities if she feels sleepy or \"drowsy.\"      Total of 30 minutes was spent in face to face contact with patient with > 50% in counseling and coordination of care. Options for treatment and/or follow-up care were reviewed with the patient. Mary Carrera was engaged and actively involved in the decision making " process. She verbalized understanding of the options discussed and was satisfied with the final plan.     RTC in 1 year for follow up of MANNIE (or sooner if develops new or worsening symptoms).     Thank you for allowing me participate in the care of Mary Carrera.     Reason for Follow Up:     CC: 66 year old female pt presents for a follow up visit for MANNIE.     History of Present Illness:      Mary Carrera is a 66 year old female with history of major depressive disorder, hyperlipidemia, OA knee, and recent hypoxia post-op as well as acoustic neurinoma who presents to the [facility] in [place] for an MANNIE follow up visit. During her initial evaluation with Dr Cobian, the patient explained that she underwent right TKA on 12/5/16 at [facility]. During the hospitalization, she was noticed to have episodes of hypoxia and she required 2 L/min of oxygen. During the hospitalization, she had nocturnal oximetry while on 2 L/min of oxygen. This showed no sleep related hypoxia with O2, with time spent O2 sat below 88% of 4:18 minutes (0.9%), lowest O2 saturation was 72%. The patient was referred for evaluation of SBD. During this initial visit, she explained that she snores during the night and has witnessed apneas. She denied any nocturia. A PSG sleep study was order by Dr Cobian and this showed moderate to severe MANNIE with an AHI of 29.7 events per hour (the condition was REM predominant). Sleep related hypoxemia was also present during the study with 34.8 minutes below 89%. Some increased PLM index was increased at 50.0 movements per hour with a PLM arousal index of 21.1 per hour.    The patient was placed on an Auto-titration PAP device with minimum pressure of 9 cmH2O and maximum pressure of 18 cmH2O. Today, the PAP device download showed the patient is compliant with PAP treatment. She is using the device 100% of the time with 100% of the time used over 4 hours (average use is over 9 hours). The 95th  percentile pressure is 12.9 cmH2O (maximum pressure is at 14.1 cmH2O). Some mild leakage is noted at 95th percentile at 36.2 L/min. The residual AHI is 0.6 events per hour. No obvious central events or periodic breathing noted.    Pt is still using a nasal interface with no integumentary discomfort or leaks. She explains that with the CPAP, she is able to sleep better throughout the night and she reports some refreshed sleeping. She also denies any gasping / choking for air, snoring, ot witnessed apneas when using the device. Pt denies any aerophagia, GERD sxs, xerostomia, skin problems, CP, SOB, or any other sxs or concerns. She also reports improved sleep inertia and EDS. Pt likes the CPAP.    The lisinopril was discontinued the patient explains today.    The patient is going to a trip to [place] in one week. She is looking into portable devices.    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)  Hand Dominance: RHD     --Coffeanated beverages: One coffee in the morning  --Drowsy driving / near incidents: No     Allergies:           Allergies   Allergen Reactions     No Known Allergies        Past Medical History:       Past Medical History         Past Medical History   Diagnosis Date     Chronic depressive personality disorder       Other and unspecified alcohol dependence, in remission       Hearing loss       Arthritis           Past Surgical History:       Past Surgical History           Past Surgical History   Procedure Laterality Date     C ligate fallopian tube   1982       Tubal Ligation     Hc removal of leg veins/ulcer   1988 & 1996     C nonspecific procedure   6/96       Acoustic Neuroma left, dr tran/olivia     C revision of cranial nerve   10/2006       recurrent acoustic neuroma     Colonoscopy   2007       WNL     Colonoscopy   5/13/2014       Procedure: COLONOSCOPY; Surgeon: Priyanka Galvan MD; Location:  GI     Rotator cuff repair rt/lt   10/2015       right     Ent surgery          Arthroplasty knee Right 12/5/2016       Procedure: ARTHROPLASTY KNEE; Surgeon: Severo Conroy MD; Location: [facility] OR         Social History:       Social History    Social History            Social History     Marital Status: Single       Spouse Name: N/A     Number of Children: 3     Years of Education: 16           Occupational History     [employer]             Social History Main Topics     Smoking status: Never Smoker      Smokeless tobacco: Never Used     Alcohol Use: 2.5 oz/week       5 Standard drinks or equivalent per week         Comment: wine     Drug Use: No     Sexual Activity:        Partners: Male       Birth Control/ Protection: Surgical            Other Topics Concern      Service No     Blood Transfusions No     Caffeine Concern No     Occupational Exposure No     Stress Concern Yes       divorce and mental health issues     Weight Concern Yes     Special Diet Yes       low fat     Back Care No     Exercise Yes     Seat Belt No     Self-Exams Yes      Social History Narrative            Smoking:          Social History   Substance Use Topics     Smoking status: Never Smoker      Smokeless tobacco: Never Used     Alcohol Use: 2.5 oz/week       5 Standard drinks or equivalent per week         Comment: wine      -Illicit drugs: None Reported  -Alcohol intake: More than 5 drinks a week with negative CAGE     Family History:       Family History          Family History   Problem Relation Age of Onset     Alcohol/Drug Father       HEART DISEASE Mother       CANCER No family hx of       DIABETES No family hx of           Immunization:           Immunization History   Administered Date(s) Administered     Hepatitis A Vac Ped/Adol-2 Dose 02/10/1999, 07/06/1999     Influenza (H1N1) 12/22/2009     Influenza (IIV3) 01/03/2001, 11/01/2008, 09/01/2009, 10/15/2012, 09/19/2013, 09/25/2013     Influenza Vaccine IM 3yrs+ 4 Valent IIV4 09/25/2015, 09/15/2016     TD  "(ADULT, 7+) 06/21/1994, 01/30/2004     Tdap (Adacel,Boostrix) 10/01/2011     Varicella 09/27/2015      Medications:      Current Outpatient Prescriptions   Medication     ibuprofen (ADVIL/MOTRIN) 800 MG tablet     sertraline (ZOLOFT) 100 MG tablet     order for DME     acetaminophen (TYLENOL) 325 MG tablet     multivitamin  with lutein (OCUVITE WITH LTEIN) CAPS     ibuprofen (ADVIL,MOTRIN) 800 MG tablet     hydrocortisone (WESTCORT) 0.2 % cream     PREVIDENT 5000 BOOSTER PLUS 1.1 % PSTE     No current facility-administered medications for this visit.       Review of Systems:   I have done 14 points of review systems and no obvious new pertinent findings reported.     General: no fever, chills, weakness  HENT: No loss of hearing, vertigo, ear ringing, sore throat, epistaxis  EYES: Diplopia, blurry vision, photophobia.  Pulmonary: No dyspnea, wheezing, cough, sputum, hemoptysis, chest pain  Sleep: No EDS, snoring, insomnia, cataplexy, restless legs, REM behavior   CVS: No chest discomfort, palpitations  GI: No diarrhea, constipation, dysphagia, early satiety, bleeding  Renal: No pain on urination, hematuria, freq micturation  Musculoskeletal: No muscle ache, joint pain, swelling  Skin: No rash, ecchymosis, itching, icterus  Neurology: No tingling, weakness, numbness  Heme: No easy bruisibility or bleeding  Allergy: runny nose, sneezing, urticeria  Psychiatry: no change in mood and affect     Physical Examination:   /73  Pulse 86  Resp 14  Ht 1.651 m (5' 5\")  Wt 102.5 kg (226 lb)  LMP 10/15/2003  SpO2 96%  BMI 37.61 kg/m2     VS: Reviewed and normal.  General: Alert, oriented, not in distress  HEENT: Normocephalic and atraumatic; NL TM x 2; pupils are isocoric and equally responsive to the light. PERRLA. EOMI. Normal fundoscopic examination; Nasal turbinates are normal with a normal septal alignment; Mallampati score: Grade IV; Tonsillar hypertrophy: +1; Pharynx with no erythema or exudates.  NECK: neck " supple; symmetrical; no lymphadenopathy; no thyromegaly, bruit, JVD noted.  Lungs: both hemithoraces are symmetrical, normal to palpation, no dullness to percussion, auscultation of lungs revealed normal breath sounds with no expirium prolongation, wheezing, rhonci and crackles.  CVS: Normal S1 and S2 heart sounds with no extra heart sounds. No murmur, rubs, or clicks. Normal peripheral pulses throughout with no obvious peripheral edema.  Psychiatry: Mood and affect are appropriate. No SI/HI with adequate insight and judgement.     Mark Marsh MD, MPH  Sleep Medicine Clinic     [facility] [address], [place], [place] [address] [phone] Clinic     Total time spent with patient: 30 min. Over >50% of the time was spent for face to face counseling, education, and evaluation.

## 2018-01-25 NOTE — PATIENT INSTRUCTIONS
Your BMI is Body mass index is 37.61 kg/(m^2).  Weight management is a personal decision.  If you are interested in exploring weight loss strategies, the following discussion covers the approaches that may be successful. Body mass index (BMI) is one way to tell whether you are at a healthy weight, overweight, or obese. It measures your weight in relation to your height.  A BMI of 18.5 to 24.9 is in the healthy range. A person with a BMI of 25 to 29.9 is considered overweight, and someone with a BMI of 30 or greater is considered obese. More than two-thirds of American adults are considered overweight or obese.  Being overweight or obese increases the risk for further weight gain. Excess weight may lead to heart disease and diabetes.  Creating and following plans for healthy eating and physical activity may help you improve your health.  Weight control is part of healthy lifestyle and includes exercise, emotional health, and healthy eating habits. Careful eating habits lifelong are the mainstay of weight control. Though there are significant health benefits from weight loss, long-term weight loss with diet alone may be very difficult to achieve- studies show long-term success with dietary management in less than 10% of people. Attaining a healthy weight may be especially difficult to achieve in those with severe obesity. In some cases, medications, devices and surgical management might be considered.  What can you do?  If you are overweight or obese and are interested in methods for weight loss, you should discuss this with your provider.     Consider reducing daily calorie intake by 500 calories.     Keep a food journal.     Avoiding skipping meals, consider cutting portions instead.    Diet combined with exercise helps maintain muscle while optimizing fat loss. Strength training is particularly important for building and maintaining muscle mass. Exercise helps reduce stress, increase energy, and improves  fitness. Increasing exercise without diet control, however, may not burn enough calories to loose weight.       Start walking three days a week 10-20 minutes at a time    Work towards walking thirty minutes five days a week     Eventually, increase the speed of your walking for 1-2 minutes at time    In addition, we recommend that you review healthy lifestyles and methods for weight loss available through the National Institutes of Health patient information sites:  http://win.niddk.nih.gov/publications/index.htm    And look into health and wellness programs that may be available through your health insurance provider, employer, local community center, or humberto club.    Weight management plan: Patient was referred to their PCP to discuss a diet and exercise plan.     Your blood pressure was checked while you were in clinic today.  Please read the guidelines below about what these numbers mean and what you should do about them.  Your systolic blood pressure is the top number.  This is the pressure when the heart is pumping.  Your diastolic blood pressure is the bottom number.  This is the pressure in between beats.  If your systolic blood pressure is less than 120 and your diastolic blood pressure is less than 80, then your blood pressure is normal. There is nothing more that you need to do about it  If your systolic blood pressure is 120-139 or your diastolic blood pressure is 80-89, your blood pressure may be higher than it should be.  You should have your blood pressure re-checked within a year by a primary care provider.  If your systolic blood pressure is 140 or greater or your diastolic blood pressure is 90 or greater, you may have high blood pressure.  High blood pressure is treatable, but if left untreated over time it can put you at risk for heart attack, stroke, or kidney failure.  You should have your blood pressure re-checked by a primary care provider within the next four weeks.    1. CPAP-  WHAT DOES IT  DO AND HOW CAN I LEARN TO WEAR IT?                               BEFORE I START, CAN I WATCH A MOVIE TO GET A PLAN ON HOW TO USE CPAP?  https://www.youtube.com/watch?i=r8J30tn245K      Continuous positive airway pressure, or CPAP, is the most effective treatment for obstructive sleep apnea. It works by blowing room air, through a mask, to hold your throat open. A decision to use CPAP is a major step forward in the pursuit of a healthier life. The successful use of CPAP will help you breathe easier, sleep better and live healthier. You can choose CPAP equipment from any durable medical equipment provider that meets your needs.  Using CPAP can be a positive experience if you keep these matthews points in mind:  1. Commitment  CPAP is not a quick fix for your problem. It involves a long-term commitment to improve your sleep and your health.    2. Communication  Stay in close communication with both your sleep doctor and your CPAP supplier. Ask lots of questions and seek help when you need it.    3. Consistency  Use CPAP all night, every night and for every nap. You will receive the maximum health benefits from CPAP when you use it every time that you sleep. This will also make it easier for your body to adjust to the treatment.    4. Correction  The first machine and mask that you try may not be the best ones for you. Work with your sleep doctor and your CPAP supplier to make corrections to your equipment selection. Ask about trying a different type of machine or mask if you have ongoing problems. Make sure that your mask is a good fit and learn to use your equipment properly.    5. Challenge  Tell a family member or close friend to ask you each morning if you used your CPAP the previous night. Have someone to challenge you to give it your best effort.    6. Connection   Your adjustment to CPAP will be easier if you are able to connect with others who use the same treatment. Ask your sleep doctor if there is a support group  "in your area for people who have sleep apnea, or look for one on the Internet.  7. Comfort   Increase your level of comfort by using a saline spray, decongestant or heated humidifier if CPAP irritates your nose, mouth or throat. Use your unit's \"ramp\" setting to slowly get used to the air pressure level. There may be soft pads you can buy that will fit over your mask straps. Look on www.CPAP.com for accessories that can help make CPAP use more comfortable.  8. Cleaning   Clean your mask, tubing and headgear on a regular basis. Put this time in your schedule so that you don't forget to do it. Check and replace the filters for your CPAP unit and humidifier.    9. Completion   Although you are never finished with CPAP therapy, you should reward yourself by celebrating the completion of your first month of treatment. Expect this first month to be your hardest period of adjustment. It will involve some trial and error as you find the machine, mask and pressure settings that are right for you.    10. Continuation  After your first month of treatment, continue to make a daily commitment to use your CPAP all night, every night and for every nap.    CPAP-Tips to starting with success:  Begin using your CPAP for short periods of time during the day while you watch TV or read.    Use CPAP every night and for every nap. Using it less often reduces the health benefits and makes it harder for your body to get used to it.    Make small adjustments to your mask, tubing, straps and headgear until you get the right fit. Tightening the mask may actually worsen the leak.  If it leaves significant marks on your face or irritates the bridge of your nose, it may not be the best mask for you.  Speak with the person who supplied the mask and consider trying other masks. Insurances will allow you to try different masks during the first month of starting CPAP.  Insurance also covers a new mask, hose and filter about every 6 months.    Use a " saline nasal spray to ease mild nasal congestion. Neti-Pot or saline nasal rinses may also help. Nasal gel sprays can help reduce nasal dryness.  Biotene mouthwash can be helpful to protect your teeth if you experience frequent dry mouth.  Dry mouth may be a sign of air escaping out of your mouth or out of the mask in the case of a full face mask.  Speak with your provider if you expect that is the case.     Take a nasal decongestant to relieve more severe nasal or sinus congestion.  Do not use Afrin (oxymetazoline) nasal spray more than 3 days in a row.  Speak with your sleep doctor if your nasal congestion is chronic.    Use a heated humidifier that fits your CPAP model to enhance your breathing comfort. Adjust the heat setting up if you get a dry nose or throat, down if you get condensation in the hose or mask.  Position the CPAP lower than you so that any condensation in the hose drains back into the machine rather than towards the mask.    Try a system that uses nasal pillows if traditional masks give you problems.    Clean your mask, tubing and headgear once a week. Make sure the equipment dries fully.    Regularly check and replace the filters for your CPAP unit and humidifier.    Work closely with your sleep provider and your CPAP supplier to make sure that you have the machine, mask and air pressure setting that works best for you. It is better to stop using it and call your provider to solve problems than to lay awake all night frustrated with the device.    Daytime naps are not advised, but use the PAP device if taking naps. Many insurances require that we prove you are using the PAP device at least 4 hours on at least 70% of nights over a 30 day period. We have 90 days to meet those criteria.    You can get new supplies (mask, hose and filter) for your device every 3-6 months (covered by insurance). You do not need to get supplies that often, but they are available if you would like them. You may  exchange the mask once within the first month if you feel the initial mask does not fit well. Please, contact your medical equipment provider for equipment issues.    Please let me know if you have any snoring, daytime sleepiness, or poor sleep quality. We will want to make sure your PAP device is adequately treating your condition.    There is a website called CPAP.com that has accessories that may make CPAP use easier. Please visit it at your convenience.    Schedule a follow up within one year. Continue the good work.    Enjoy your trip!    Mark Marsh MD, MPH  Clinical Sleep and Occupational / Environmental Medicine    PS, the portable device is called Z1 CPAP.

## 2018-01-25 NOTE — MR AVS SNAPSHOT
After Visit Summary   1/25/2018    Mary Carrera    MRN: [id number]           Patient Information     Date Of Birth          1950        Visit Information        Provider Department      1/25/2018 1:30 PM Mark Marsh MD [facility]        Care Instructions          Your BMI is Body mass index is 37.61 kg/(m^2).  Weight management is a personal decision.  If you are interested in exploring weight loss strategies, the following discussion covers the approaches that may be successful. Body mass index (BMI) is one way to tell whether you are at a healthy weight, overweight, or obese. It measures your weight in relation to your height.  A BMI of 18.5 to 24.9 is in the healthy range. A person with a BMI of 25 to 29.9 is considered overweight, and someone with a BMI of 30 or greater is considered obese. More than two-thirds of American adults are considered overweight or obese.  Being overweight or obese increases the risk for further weight gain. Excess weight may lead to heart disease and diabetes.  Creating and following plans for healthy eating and physical activity may help you improve your health.  Weight control is part of healthy lifestyle and includes exercise, emotional health, and healthy eating habits. Careful eating habits lifelong are the mainstay of weight control. Though there are significant health benefits from weight loss, long-term weight loss with diet alone may be very difficult to achieve- studies show long-term success with dietary management in less than 10% of people. Attaining a healthy weight may be especially difficult to achieve in those with severe obesity. In some cases, medications, devices and surgical management might be considered.  What can you do?  If you are overweight or obese and are interested in methods for weight loss, you should discuss this with your provider.     Consider reducing daily calorie intake by 500 calories.      Keep a food journal.     Avoiding skipping meals, consider cutting portions instead.    Diet combined with exercise helps maintain muscle while optimizing fat loss. Strength training is particularly important for building and maintaining muscle mass. Exercise helps reduce stress, increase energy, and improves fitness. Increasing exercise without diet control, however, may not burn enough calories to loose weight.       Start walking three days a week 10-20 minutes at a time    Work towards walking thirty minutes five days a week     Eventually, increase the speed of your walking for 1-2 minutes at time    In addition, we recommend that you review healthy lifestyles and methods for weight loss available through the National Institutes of Health patient information sites:  http://win.niddk.nih.gov/publications/index.htm    And look into health and wellness programs that may be available through your health insurance provider, employer, local community center, or humberto club.    Weight management plan: Patient was referred to their PCP to discuss a diet and exercise plan.     Your blood pressure was checked while you were in clinic today.  Please read the guidelines below about what these numbers mean and what you should do about them.  Your systolic blood pressure is the top number.  This is the pressure when the heart is pumping.  Your diastolic blood pressure is the bottom number.  This is the pressure in between beats.  If your systolic blood pressure is less than 120 and your diastolic blood pressure is less than 80, then your blood pressure is normal. There is nothing more that you need to do about it  If your systolic blood pressure is 120-139 or your diastolic blood pressure is 80-89, your blood pressure may be higher than it should be.  You should have your blood pressure re-checked within a year by a primary care provider.  If your systolic blood pressure is 140 or greater or your diastolic blood pressure  is 90 or greater, you may have high blood pressure.  High blood pressure is treatable, but if left untreated over time it can put you at risk for heart attack, stroke, or kidney failure.  You should have your blood pressure re-checked by a primary care provider within the next four weeks.    1. CPAP-  WHAT DOES IT DO AND HOW CAN I LEARN TO WEAR IT?                               BEFORE I START, CAN I WATCH A MOVIE TO GET A PLAN ON HOW TO USE CPAP?  https://www.Buddy.com/watch?r=v1M76gi114Q      Continuous positive airway pressure, or CPAP, is the most effective treatment for obstructive sleep apnea. It works by blowing room air, through a mask, to hold your throat open. A decision to use CPAP is a major step forward in the pursuit of a healthier life. The successful use of CPAP will help you breathe easier, sleep better and live healthier. You can choose CPAP equipment from any durable medical equipment provider that meets your needs.  Using CPAP can be a positive experience if you keep these matthews points in mind:  1. Commitment  CPAP is not a quick fix for your problem. It involves a long-term commitment to improve your sleep and your health.    2. Communication  Stay in close communication with both your sleep doctor and your CPAP supplier. Ask lots of questions and seek help when you need it.    3. Consistency  Use CPAP all night, every night and for every nap. You will receive the maximum health benefits from CPAP when you use it every time that you sleep. This will also make it easier for your body to adjust to the treatment.    4. Correction  The first machine and mask that you try may not be the best ones for you. Work with your sleep doctor and your CPAP supplier to make corrections to your equipment selection. Ask about trying a different type of machine or mask if you have ongoing problems. Make sure that your mask is a good fit and learn to use your equipment properly.    5. Challenge  Tell a family member  "or close friend to ask you each morning if you used your CPAP the previous night. Have someone to challenge you to give it your best effort.    6. Connection   Your adjustment to CPAP will be easier if you are able to connect with others who use the same treatment. Ask your sleep doctor if there is a support group in your area for people who have sleep apnea, or look for one on the Internet.  7. Comfort   Increase your level of comfort by using a saline spray, decongestant or heated humidifier if CPAP irritates your nose, mouth or throat. Use your unit's \"ramp\" setting to slowly get used to the air pressure level. There may be soft pads you can buy that will fit over your mask straps. Look on www.CPAP.com for accessories that can help make CPAP use more comfortable.  8. Cleaning   Clean your mask, tubing and headgear on a regular basis. Put this time in your schedule so that you don't forget to do it. Check and replace the filters for your CPAP unit and humidifier.    9. Completion   Although you are never finished with CPAP therapy, you should reward yourself by celebrating the completion of your first month of treatment. Expect this first month to be your hardest period of adjustment. It will involve some trial and error as you find the machine, mask and pressure settings that are right for you.    10. Continuation  After your first month of treatment, continue to make a daily commitment to use your CPAP all night, every night and for every nap.    CPAP-Tips to starting with success:  Begin using your CPAP for short periods of time during the day while you watch TV or read.    Use CPAP every night and for every nap. Using it less often reduces the health benefits and makes it harder for your body to get used to it.    Make small adjustments to your mask, tubing, straps and headgear until you get the right fit. Tightening the mask may actually worsen the leak.  If it leaves significant marks on your face or " irritates the bridge of your nose, it may not be the best mask for you.  Speak with the person who supplied the mask and consider trying other masks. Insurances will allow you to try different masks during the first month of starting CPAP.  Insurance also covers a new mask, hose and filter about every 6 months.    Use a saline nasal spray to ease mild nasal congestion. Neti-Pot or saline nasal rinses may also help. Nasal gel sprays can help reduce nasal dryness.  Biotene mouthwash can be helpful to protect your teeth if you experience frequent dry mouth.  Dry mouth may be a sign of air escaping out of your mouth or out of the mask in the case of a full face mask.  Speak with your provider if you expect that is the case.     Take a nasal decongestant to relieve more severe nasal or sinus congestion.  Do not use Afrin (oxymetazoline) nasal spray more than 3 days in a row.  Speak with your sleep doctor if your nasal congestion is chronic.    Use a heated humidifier that fits your CPAP model to enhance your breathing comfort. Adjust the heat setting up if you get a dry nose or throat, down if you get condensation in the hose or mask.  Position the CPAP lower than you so that any condensation in the hose drains back into the machine rather than towards the mask.    Try a system that uses nasal pillows if traditional masks give you problems.    Clean your mask, tubing and headgear once a week. Make sure the equipment dries fully.    Regularly check and replace the filters for your CPAP unit and humidifier.    Work closely with your sleep provider and your CPAP supplier to make sure that you have the machine, mask and air pressure setting that works best for you. It is better to stop using it and call your provider to solve problems than to lay awake all night frustrated with the device.    Daytime naps are not advised, but use the PAP device if taking naps. Many insurances require that we prove you are using the PAP  device at least 4 hours on at least 70% of nights over a 30 day period. We have 90 days to meet those criteria.    You can get new supplies (mask, hose and filter) for your device every 3-6 months (covered by insurance). You do not need to get supplies that often, but they are available if you would like them. You may exchange the mask once within the first month if you feel the initial mask does not fit well. Please, contact your medical equipment provider for equipment issues.    Please let me know if you have any snoring, daytime sleepiness, or poor sleep quality. We will want to make sure your PAP device is adequately treating your condition.    There is a website called CPAP.com that has accessories that may make CPAP use easier. Please visit it at your convenience.    Schedule a follow up within one year. Continue the good work.    Enjoy your trip!    Mark Marsh MD, MPH  Clinical Sleep and Occupational / Environmental Medicine    PS, the portable device is called Z1 CPAP.            Follow-ups after your visit        Who to contact     If you have questions or need follow up information about today's clinic visit or your schedule please contact [facility] directly at [phone].  Normal or non-critical lab and imaging results will be communicated to you by [facility] [employer], letter or phone within 4 business days after the clinic has received the results. If you do not hear from us within 7 days, please contact the clinic through [employer] or phone. If you have a critical or abnormal lab result, we will notify you by phone as soon as possible.  Submit refill requests through [employer] or call your pharmacy and they will forward the refill request to us. Please allow 3 business days for your refill to be completed.          Additional Information About Your Visit        [email or web address] Information     Digital Legends lets you send messages to your doctor, view your test results, renew your prescriptions,  "schedule appointments and more. To sign up, go to [email or web address] . Click on \"Log in\" on the left side of the screen, which will take you to the Welcome page. Then click on \"Sign up Now\" on the right side of the page.     You will be asked to enter the access code listed below, as well as some personal information. Please follow the directions to create your username and password.     Your access code is: [email or web address]  Expires: 2018  1:52 PM     Your access code will  in 90 days. If you need help or a new code, please call your [place] clinic or [phone].        Care EveryWhere ID     This is your Care EveryWhere ID. This could be used by other organizations to access your [place] medical records  [id number]        Your Vitals Were     Pulse Respirations Height Last Period Pulse Oximetry BMI (Body Mass Index)    86 14 1.651 m (5' 5\") 10/15/2003 96% 37.61 kg/m2       Blood Pressure from Last 3 Encounters:   18 128/73   17 112/56   17 132/78    Weight from Last 3 Encounters:   18 102.5 kg (226 lb)   17 107.4 kg (236 lb 12.4 oz)   17 107 kg (236 lb)              Today, you had the following     No orders found for display         Today's Medication Changes          These changes are accurate as of 18  1:52 PM.  If you have any questions, ask your nurse or doctor.               Stop taking these medicines if you haven't already. Please contact your care team if you have questions.     oxyCODONE IR 5 MG tablet   Commonly known as:  ROXICODONE                    Primary Care Provider Office Phone # Fax #    Lynn Lim [phone] [address]  [address] [place] [id number]        Equal Access to Services     CARMEN HOOPER : Apple Galaviz, sherry cummins, keren davis. [facility] [phone].    ATENCIÓN: Si habla espjsotin, tiene a lehman disposición " servicios gratuitos de asistencia lingüística. Janessa ledezma [phone].    We comply with applicable federal civil rights laws and [place] laws. We do not discriminate on the basis of race, color, national origin, age, disability, sex, sexual orientation, or gender identity.            Thank you!     Thank you for choosing [facility]  for your care. Our goal is always to provide you with excellent care. Hearing back from our patients is one way we can continue to improve our services. Please take a few minutes to complete the written survey that you may receive in the mail after your visit with us. Thank you!             Your Updated Medication List - Protect others around you: Learn how to safely use, store and throw away your medicines at www.disposemymeds.org.          This list is accurate as of 1/25/18  1:52 PM.  Always use your most recent med list.                   Brand Name Dispense Instructions for use Diagnosis    acetaminophen 325 MG tablet    TYLENOL    100 tablet    Take 2 tablets (650 mg) by mouth every 4 hours as needed for other (surgical pain)    Primary osteoarthritis of right knee       hydrocortisone 0.2 % cream    WESTCORT    45 g    Apply topically 2 times daily    Other eczema       * ibuprofen 800 MG tablet    ADVIL/MOTRIN     Take 800 mg by mouth        * ibuprofen 800 MG tablet    ADVIL/MOTRIN    60 tablet    Take 1 tablet (800 mg) by mouth every 12 hours as needed for moderate pain    S/P total knee arthroplasty, left       multivitamin  with lutein Caps per capsule      Take 1 capsule by mouth daily    Primary osteoarthritis of right knee, Pre-op exam       order for DME      Equipment ordered: RESMED Auto PAP Mask type: Nasal Settings: 8-16 CM H2O        PREVIDENT 5000 BOOSTER PLUS 1.1 % Pste   Generic drug:  Sodium Fluoride           sertraline 100 MG tablet    ZOLOFT    90 tablet    Take 1 tablet (100 mg) by mouth daily    Depression, major, recurrent, in  complete remission (H)       * Notice:  This list has 2 medication(s) that are the same as other medications prescribed for you. Read the directions carefully, and ask your doctor or other care provider to review them with you.

## 2018-01-25 NOTE — NURSING NOTE
"Chief Complaint   Patient presents with     RECHECK     f/u cpap       Initial /73  Pulse 86  Resp 14  Ht 1.651 m (5' 5\")  Wt 102.5 kg (226 lb)  LMP 10/15/2003  SpO2 96%  BMI 37.61 kg/m2 Estimated body mass index is 37.61 kg/(m^2) as calculated from the following:    Height as of this encounter: 1.651 m (5' 5\").    Weight as of this encounter: 102.5 kg (226 lb).  Medication Reconciliation: complete       Jen Triplett LPN/REMBERTO  "

## 2018-03-14 ENCOUNTER — OFFICE VISIT (OUTPATIENT)
Dept: FAMILY MEDICINE | Facility: CLINIC | Age: 68
End: 2018-03-14

## 2018-03-14 VITALS
SYSTOLIC BLOOD PRESSURE: 164 MMHG | WEIGHT: 233.6 LBS | HEIGHT: 65 IN | RESPIRATION RATE: 16 BRPM | OXYGEN SATURATION: 97 % | DIASTOLIC BLOOD PRESSURE: 90 MMHG | TEMPERATURE: 97.1 F | BODY MASS INDEX: 38.92 KG/M2 | HEART RATE: 69 BPM

## 2018-03-14 DIAGNOSIS — F33.42 DEPRESSION, MAJOR, RECURRENT, IN COMPLETE REMISSION (H): Primary | ICD-10-CM

## 2018-03-14 PROCEDURE — 99214 OFFICE O/P EST MOD 30 MIN: CPT | Performed by: FAMILY MEDICINE

## 2018-03-14 RX ORDER — SERTRALINE HYDROCHLORIDE 100 MG/1
100 TABLET, FILM COATED ORAL DAILY
Qty: 90 TABLET | Refills: 3 | Status: SHIPPED | OUTPATIENT
Start: 2018-03-14 | End: 2019-01-11

## 2018-03-14 ASSESSMENT — ANXIETY QUESTIONNAIRES
5. BEING SO RESTLESS THAT IT IS HARD TO SIT STILL: NOT AT ALL
1. FEELING NERVOUS, ANXIOUS, OR ON EDGE: NOT AT ALL
6. BECOMING EASILY ANNOYED OR IRRITABLE: NOT AT ALL
GAD7 TOTAL SCORE: 0
2. NOT BEING ABLE TO STOP OR CONTROL WORRYING: NOT AT ALL
7. FEELING AFRAID AS IF SOMETHING AWFUL MIGHT HAPPEN: NOT AT ALL
3. WORRYING TOO MUCH ABOUT DIFFERENT THINGS: NOT AT ALL
IF YOU CHECKED OFF ANY PROBLEMS ON THIS QUESTIONNAIRE, HOW DIFFICULT HAVE THESE PROBLEMS MADE IT FOR YOU TO DO YOUR WORK, TAKE CARE OF THINGS AT HOME, OR GET ALONG WITH OTHER PEOPLE: NOT DIFFICULT AT ALL

## 2018-03-14 ASSESSMENT — PATIENT HEALTH QUESTIONNAIRE - PHQ9: 5. POOR APPETITE OR OVEREATING: NOT AT ALL

## 2018-03-14 NOTE — MR AVS SNAPSHOT
After Visit Summary   3/14/2018    Mary Carrera    MRN: [id number]           Patient Information     Date Of Birth          1950        Visit Information        Provider Department      3/14/2018 11:15 AM Lynn Lim MD [facility]        Today's Diagnoses     Depression, major, recurrent, in complete remission (H)    -  1      Care Instructions    Depression, major, recurrent, in complete remission (H)  (primary encounter diagnosis)  Plan: sertraline (ZOLOFT) 100 MG tablet        I've explained to her that drugs of the SSRI class can have side effects such as weight gain, sexual dysfunction, insomnia, headache, nausea. These medications are generally effective at alleviating symptoms of anxiety and/or depression. Let me know if significant side effects do occur.  Recheck 1 year            Follow-ups after your visit        Follow-up notes from your care team     Return in about 1 year (around 3/14/2019), or if symptoms worsen or fail to improve.      Who to contact     If you have questions or need follow up information about today's clinic visit or your schedule please contact [facility] directly at [phone].  Normal or non-critical lab and imaging results will be communicated to you by MyChart, letter or phone within 4 business days after the clinic has received the results. If you do not hear from us within 7 days, please contact the clinic through [employer] or phone. If you have a critical or abnormal lab result, we will notify you by phone as soon as possible.  Submit refill requests through [employer] or call your pharmacy and they will forward the refill request to us. Please allow 3 business days for your refill to be completed.          Additional Information About Your Visit        Care EveryWhere ID     This is your Care EveryWhere ID. This could be used by other organizations to access your [place] medical records  [id number]    "     Your Vitals Were     Pulse Temperature Respirations Height Last Period Pulse Oximetry    69 97.1  F (36.2  C) (Oral) 16 1.638 m (5' 4.5\") 10/15/2003 97%    Breastfeeding? BMI (Body Mass Index)                No 39.48 kg/m2           Blood Pressure from Last 3 Encounters:   03/14/18 164/90   01/25/18 128/73   09/13/17 112/56    Weight from Last 3 Encounters:   03/14/18 106 kg (233 lb 9.6 oz)   01/25/18 102.5 kg (226 lb)   09/12/17 107.4 kg (236 lb 12.4 oz)              Today, you had the following     No orders found for display         Where to get your medicines      Some of these will need a paper prescription and others can be bought over the counter.  Ask your nurse if you have questions.     Bring a paper prescription for each of these medications     sertraline 100 MG tablet          Primary Care Provider Office Phone # Fax #    Lynn Lim [phone]       [facility] [address] [address]        Equal Access to Services     [facility]: Hadii tiffanie lindquist Socathi, waaxda placido, qaybta kaalananya diaz, keren vo . So [facility] [phone].    ATENCIÓN: Si habla español, tiene a lehman disposición servicios gratuitos de asistencia lingüística. [facility] [phone].    We comply with applicable federal civil rights laws and [place] laws. We do not discriminate on the basis of race, color, national origin, age, disability, sex, sexual orientation, or gender identity.            Thank you!     Thank you for choosing [facility]  for your care. Our goal is always to provide you with excellent care. Hearing back from our patients is one way we can continue to improve our services. Please take a few minutes to complete the written survey that you may receive in the mail after your visit with us. Thank you!             Your Updated Medication List - Protect others around you: Learn how to safely use, store and " throw away your medicines at www.disposemymeds.org.          This list is accurate as of 3/14/18 12:07 PM.  Always use your most recent med list.                   Brand Name Dispense Instructions for use Diagnosis    hydrocortisone 0.2 % cream    WESTCORT    45 g    Apply topically 2 times daily    Other eczema       ibuprofen 800 MG tablet    ADVIL/MOTRIN    60 tablet    Take 1 tablet (800 mg) by mouth every 12 hours as needed for moderate pain    S/P total knee arthroplasty, left       multivitamin  with lutein Caps per capsule      Take 1 capsule by mouth daily    Primary osteoarthritis of right knee, Pre-op exam       PREVIDENT 5000 BOOSTER PLUS 1.1 % Pste   Generic drug:  Sodium Fluoride           sertraline 100 MG tablet    ZOLOFT    90 tablet    Take 1 tablet (100 mg) by mouth daily    Depression, major, recurrent, in complete remission (H)

## 2018-03-14 NOTE — NURSING NOTE
Mary Carrera is here today for a non fasting medication recheck,    Pre-visit Screening:    Immunizations:  up to date  Colonoscopy:  is up to date  Mammogram: is up to date  Asthma Action Test/Plan:  NA  PHQ9:  is completed today  GAD7:  is completed today    Questioned patient about current smoking habits Pt. has never smoked.    Is it ok to leave a detailed message on cell phone's voice mail for today's visit only? Yes   Phone # [phone]      Joycelyn hTomas CMA

## 2018-03-14 NOTE — PATIENT INSTRUCTIONS
Depression, major, recurrent, in complete remission (H)  (primary encounter diagnosis)  Plan: sertraline (ZOLOFT) 100 MG tablet        I've explained to her that drugs of the SSRI class can have side effects such as weight gain, sexual dysfunction, insomnia, headache, nausea. These medications are generally effective at alleviating symptoms of anxiety and/or depression. Let me know if significant side effects do occur.  Recheck 1 year

## 2018-03-14 NOTE — PROGRESS NOTES
"SUBJECTIVE:  Mary Carrera is an 67 year old female who presents for follow-up   of depressive symptoms.  Initially evaluated in her 40's.  Notes from that visit reviewed.  Current symptoms include   none. Symptoms that have subjectively improved include depressed mood, hopelessness, diminished interest or pleasure in activities, weight gain, psychomotor retardation (slowness of thought and reaction), fatigue, anxiety, irritablility.  Previous and current treatment modalities   employed include individual therapy and medication(s) Zoloft (sertraline).     Organic causes of depression present: menopausal symptoms started this issue    Current Outpatient Prescriptions   Medication     sertraline (ZOLOFT) 100 MG tablet     multivitamin  with lutein (OCUVITE WITH LTEIN) CAPS     ibuprofen (ADVIL/MOTRIN) 800 MG tablet     hydrocortisone (WESTCORT) 0.2 % cream     PREVIDENT 5000 BOOSTER PLUS 1.1 % PSTE     No current facility-administered medications for this visit.      Allergies   Allergen Reactions     No Known Allergies      Side effects of medication: none    Social History   Substance Use Topics     Smoking status: Never Smoker     Smokeless tobacco: Never Used     Alcohol use 2.5 oz/week     5 Standard drinks or equivalent per week      Comment: wine         Neurologic: negative  Psychiatric: excessive stress-finances  Endocrine: negative    OBJECTIVE:  /90 (BP Location: Right arm, Patient Position: Chair, Cuff Size: Adult Large)  Pulse 69  Temp 97.1  F (36.2  C) (Oral)  Ht 1.638 m (5' 4.5\")  Wt 106 kg (233 lb 9.6 oz)  LMP 10/15/2003  SpO2 97%  Breastfeeding? No  BMI 39.48 kg/m2  Mental Status Examination  Posture and motor behavior: negative  Dress, grooming, personal hygiene: negative  Facial expression: negative  Speech: negative  Mood: negative  Coherency and relevance of thought: negative  Thought content: negative  Perceptions: negative  Orientation: negative  Attention and concentration: " negative  Memory: : negative  Information: negative  Vocabulary: negative  Abstract reasoning: negative  Judgment: negative    General appearance: healthy, alert, no distress, cooperative, smiling and over weight  Eyes: conjunctivae/corneas clear. PERRL, EOM's intact. Fundi benign  Ears: negative  Oropharynx: Lips, mucosa, and tongue normal. Teeth and gums normal.  Neck: Neck supple. No adenopathy. Thyroid symmetric, normal size,, Carotids without bruits.  Lungs: negative, Percussion normal. Good diaphragmatic excursion. Lungs clear  Heart: negative, PMI normal. No lifts, heaves, or thrills. RRR. No murmurs, clicks gallops or rub  Abdomen: Abdomen soft, non-tender. BS normal. No masses, organomegaly, positive findings: obese  Neuro: Gait normal. Reflexes normal and symmetric. Sensation grossly WNL.  BMI : Body mass index is 39.48 kg/(m^2).    ASSESSMENT:(F33.42) Depression, major, recurrent, in complete remission (H)  (primary encounter diagnosis)  Plan: sertraline (ZOLOFT) 100 MG tablet        I've explained to her that drugs of the SSRI class can have side effects such as weight gain, sexual dysfunction, insomnia, headache, nausea. These medications are generally effective at alleviating symptoms of anxiety and/or depression. Let me know if significant side effects do occur.  Recheck 1 year

## 2018-03-15 ASSESSMENT — PATIENT HEALTH QUESTIONNAIRE - PHQ9: SUM OF ALL RESPONSES TO PHQ QUESTIONS 1-9: 0

## 2018-03-15 ASSESSMENT — ANXIETY QUESTIONNAIRES: GAD7 TOTAL SCORE: 0

## 2018-09-17 DIAGNOSIS — G47.33 OSA (OBSTRUCTIVE SLEEP APNEA): Primary | ICD-10-CM

## 2018-09-18 ENCOUNTER — TELEPHONE (OUTPATIENT)
Dept: SLEEP MEDICINE | Facility: CLINIC | Age: 68
End: 2018-09-18

## 2018-09-18 NOTE — TELEPHONE ENCOUNTER
Patient returned my phone call to schedule a mask fitting. Patient stated she likes her current mask, but she would like to see what other options are out there. Scheduled mask fitting appointment at the [facility] ([address]) on 09/21/18 at 10 am. Gave patient address/location information and phone number.

## 2018-09-18 NOTE — TELEPHONE ENCOUNTER
Received Rx for PAP supplies. Per notes in [facility], patient called to request a mask fitting appointment. Called patient back to see if there are any specific reasons why she would like a mask fitting and to schedule the mask fitting appointment with [facility]. Patient was unavailable. Left voicemail for patient to call [facility] back at [phone] for scheduling.

## 2018-09-20 ENCOUNTER — TRANSFERRED RECORDS (OUTPATIENT)
Dept: FAMILY MEDICINE | Facility: CLINIC | Age: 68
End: 2018-09-20

## 2018-09-20 LAB — MAMMOGRAM: NORMAL

## 2018-11-05 ENCOUNTER — OFFICE VISIT (OUTPATIENT)
Dept: FAMILY MEDICINE | Facility: CLINIC | Age: 68
End: 2018-11-05

## 2018-11-05 VITALS
TEMPERATURE: 98.3 F | SYSTOLIC BLOOD PRESSURE: 164 MMHG | OXYGEN SATURATION: 97 % | BODY MASS INDEX: 40.29 KG/M2 | HEART RATE: 70 BPM | WEIGHT: 236 LBS | DIASTOLIC BLOOD PRESSURE: 80 MMHG | HEIGHT: 64 IN

## 2018-11-05 DIAGNOSIS — Z23 NEED FOR VACCINATION: Primary | ICD-10-CM

## 2018-11-05 DIAGNOSIS — S61.211A LACERATION OF LEFT INDEX FINGER WITHOUT FOREIGN BODY WITHOUT DAMAGE TO NAIL, INITIAL ENCOUNTER: ICD-10-CM

## 2018-11-05 PROCEDURE — G0009 ADMIN PNEUMOCOCCAL VACCINE: HCPCS | Performed by: PHYSICIAN ASSISTANT

## 2018-11-05 PROCEDURE — 12001 RPR S/N/AX/GEN/TRNK 2.5CM/<: CPT | Performed by: PHYSICIAN ASSISTANT

## 2018-11-05 PROCEDURE — 90732 PPSV23 VACC 2 YRS+ SUBQ/IM: CPT | Performed by: PHYSICIAN ASSISTANT

## 2018-11-05 PROCEDURE — 90714 TD VACC NO PRESV 7 YRS+ IM: CPT | Performed by: PHYSICIAN ASSISTANT

## 2018-11-05 PROCEDURE — 90471 IMMUNIZATION ADMIN: CPT | Mod: 59 | Performed by: PHYSICIAN ASSISTANT

## 2018-11-05 NOTE — NURSING NOTE
Mary Block is here for a cut on left index finger that happened last night and possibly needs stitches          Pre-visit Screening:  Immunizations:  not up to date - will get Pneum 23 today  Colonoscopy:  is up to date  Mammogram: is up to date  Asthma Action Test/Plan:  NA  PHQ9:  NA to today's visit  GAD7:  NA  Questioned patient about current smoking habits Pt. has never smoked.  Ok to leave detailed message on voice mail for today's visit only Yes, phone # [phone]

## 2018-11-05 NOTE — PROGRESS NOTES
"CC: Finger laceration, left, 2nd digit    History:  Last night, Mary was playing fetch with her dog. She twisted out of her slippers and fell. No injury to head or body, but then was trying to get back up using the edge of the car, and must have sliced her finger open. Did not feel pain at the time, but once she got inside looked down and saw that finger had wound and was bleeding. Rinsed it out with warm water and applied gauze and bandage- no topical antibiotic. Did swell overnight. Pain is tolerable, but both 2nd and 3rd digits are very stiff.     PMH, MEDICATIONS, ALLERGIES, SOCIAL AND FAMILY HISTORY in [facility] and reviewed by me personally.      ROS negative other than the symptoms noted above in the HPI.        Examination   /80 (BP Location: Right arm, Patient Position: Sitting, Cuff Size: Adult Large)  Pulse 70  Temp 98.3  F (36.8  C) (Oral)  Ht 1.626 m (5' 4\")  Wt 107 kg (236 lb)  LMP 10/15/2003  SpO2 97%  BMI 40.51 kg/m2       Constitutional: Sitting comfortably, in no acute distress. Vital signs noted. BP elevated.  Neck:  no adenopathy, trachea midline and normal to palpation  Cardiovascular:  regular rate and rhythm, no murmurs, clicks, or gallops  Respiratory:  normal respiratory rate and rhythm, lungs clear to auscultation  M/S: ROM of left hand 2nd digit 50% of normal flexion, 75% of normal extension.  NEURO:  muscle strength normal, sensation to light touch and pinprick normal  SKIN: No jaundice/pallor. Laceration at distal aspect of left 2nd digit proximal to DIP joint. Approximately 1.8 cm in diameter.  Moderate edema.  Psychiatric: mentation appears normal and affect normal/bright    Procedure: Soaked wound in iodine solution for 10 minutes. Anesthetized with lidocaine 2%. Applied sterile field. Edges approximated with 6 staples. Pt tolerated well. Wound edges well approximated.    A/P    ICD-10-CM    1. Need for vaccination Z23 PNEUMOCOCCAL VACCINE,ADULT,SQ OR IM     VACCINE " ADMINISTRATION, INITIAL     VACCINE ADMINISTRATION, EACH ADDITIONAL     TD (ADULT, 7+) PRESERVE FREE       DISCUSSION:  Pt instructed on wound care. Placed finger in splint to alleviate strain on staples. Monitor closely for signs of infection. Updated on tetanus today. Return in 1 week for staple removal, or contact me sooner with concerns.     follow up visit: 7 days    Maricarmen Harrison PA-C  [facility]

## 2018-11-05 NOTE — MR AVS SNAPSHOT
"              After Visit Summary   11/5/2018    Mary Carrera    MRN: [id number]           Patient Information     Date Of Birth          1950        Visit Information        Provider Department      11/5/2018 12:00 PM Maricarmen Harrison PA-C [facility]        Today's Diagnoses     Need for vaccination    -  1    Laceration of left index finger without foreign body without damage to nail, initial encounter           Follow-ups after your visit        Follow-up notes from your care team     Return in about 1 week (around 11/12/2018).      Your next 10 appointments already scheduled     Nov 12, 2018 11:00 AM CST   Office Visit with LEATHA Johnson   [facility] ([facility])    [address]  [address]  [facility] [address] [phone]              Who to contact     If you have questions or need follow up information about today's clinic visit or your schedule please contact [facility] directly at [phone].  Normal or non-critical lab and imaging results will be communicated to you by MyChart, letter or phone within 4 business days after the clinic has received the results. If you do not hear from us within 7 days, please contact the clinic through MyChart or phone. If you have a critical or abnormal lab result, we will notify you by phone as soon as possible.  Submit refill requests through [employer]ood[employer] or call your pharmacy and they will forward the refill request to us. Please allow 3 business days for your refill to be completed.          Additional Information About Your Visit        Care EveryWhere ID     This is your Care EveryWhere ID. This could be used by other organizations to access your [place] medical records  [id number]        Your Vitals Were     Pulse Temperature Height Last Period Pulse Oximetry BMI (Body Mass Index)    70 98.3  F (36.8  C) (Oral) 1.626 m (5' 4\") " 10/15/2003 97% 40.51 kg/m2       Blood Pressure from Last 3 Encounters:   11/05/18 164/80   03/14/18 164/90   01/25/18 128/73    Weight from Last 3 Encounters:   11/05/18 107 kg (236 lb)   03/14/18 106 kg (233 lb 9.6 oz)   01/25/18 102.5 kg (226 lb)              We Performed the Following     PNEUMOCOCCAL VACCINE,ADULT,SQ OR IM     REPAIR SUPERFICIAL, WOUND BODY < =2.5CM     TD (ADULT, 7+) PRESERVE FREE     VACCINE ADMINISTRATION, EACH ADDITIONAL     VACCINE ADMINISTRATION, INITIAL        Primary Care Provider Office Phone # Fax #    Lynn Lim [phone] [address]  [address] [place] [address]        Equal Access to Services     CARMEN HOOPER : Hadii aad ku hadasho Soomaali, waaxda luqadaha, qaybta kaalmada adeegyaburak, keren vo . So [facility] [phone].    ATENCIÓN: Si habla español, tiene a lehman disposición servicios gratuitos de asistencia lingüística. Llame al [phone].    We comply with applicable federal civil rights laws and [place] laws. We do not discriminate on the basis of race, color, national origin, age, disability, sex, sexual orientation, or gender identity.            Thank you!     Thank you for choosing [facility]  for your care. Our goal is always to provide you with excellent care. Hearing back from our patients is one way we can continue to improve our services. Please take a few minutes to complete the written survey that you may receive in the mail after your visit with us. Thank you!             Your Updated Medication List - Protect others around you: Learn how to safely use, store and throw away your medicines at www.disposemymeds.org.          This list is accurate as of 11/5/18 11:59 PM.  Always use your most recent med list.                   Brand Name Dispense Instructions for use Diagnosis    hydrocortisone 0.2 % cream    WESTCORT    45 g    Apply topically 2 times daily    Other eczema        ibuprofen 800 MG tablet    ADVIL/MOTRIN    60 tablet    Take 1 tablet (800 mg) by mouth every 12 hours as needed for moderate pain    S/P total knee arthroplasty, left       multivitamin  with lutein Caps per capsule      Take 1 capsule by mouth daily    Primary osteoarthritis of right knee, Pre-op exam       PREVIDENT 5000 BOOSTER PLUS 1.1 % Pste   Generic drug:  Sodium Fluoride           sertraline 100 MG tablet    ZOLOFT    90 tablet    Take 1 tablet (100 mg) by mouth daily    Depression, major, recurrent, in complete remission (H)

## 2018-11-12 ENCOUNTER — OFFICE VISIT (OUTPATIENT)
Dept: FAMILY MEDICINE | Facility: CLINIC | Age: 68
End: 2018-11-12

## 2018-11-12 VITALS
WEIGHT: 239 LBS | TEMPERATURE: 98.3 F | DIASTOLIC BLOOD PRESSURE: 86 MMHG | HEIGHT: 64 IN | BODY MASS INDEX: 40.8 KG/M2 | SYSTOLIC BLOOD PRESSURE: 162 MMHG | OXYGEN SATURATION: 97 % | HEART RATE: 75 BPM

## 2018-11-12 DIAGNOSIS — D33.3 ACOUSTIC NEUROMA (H): ICD-10-CM

## 2018-11-12 DIAGNOSIS — E66.01 MORBID OBESITY (H): ICD-10-CM

## 2018-11-12 DIAGNOSIS — I10 BENIGN ESSENTIAL HYPERTENSION: Primary | ICD-10-CM

## 2018-11-12 DIAGNOSIS — H91.90 HEARING LOSS, UNSPECIFIED HEARING LOSS TYPE, UNSPECIFIED LATERALITY: Primary | ICD-10-CM

## 2018-11-12 DIAGNOSIS — Z48.02 ENCOUNTER FOR STAPLE REMOVAL: ICD-10-CM

## 2018-11-12 PROCEDURE — 99213 OFFICE O/P EST LOW 20 MIN: CPT | Performed by: PHYSICIAN ASSISTANT

## 2018-11-12 PROCEDURE — 36415 COLL VENOUS BLD VENIPUNCTURE: CPT | Performed by: PHYSICIAN ASSISTANT

## 2018-11-12 RX ORDER — LISINOPRIL 10 MG/1
10 TABLET ORAL DAILY
Qty: 90 TABLET | Refills: 0 | Status: SHIPPED | OUTPATIENT
Start: 2018-11-12 | End: 2019-01-11

## 2018-11-12 NOTE — MR AVS SNAPSHOT
After Visit Summary   11/12/2018    Mary Carrera    MRN: [id number]           Patient Information     Date Of Birth          1950        Visit Information        Provider Department      11/12/2018 11:00 AM Ev Bose PA [facility]        Today's Diagnoses     Benign essential hypertension    -  1    Morbid obesity (H)        Encounter for staple removal           Follow-ups after your visit        Your next 10 appointments already scheduled     Dec 10, 2018 11:00 AM CST   Office Visit with Lynn Lim MD   [facility] ([facility])    [address]  [address]  [facility] [address]   [phone]              Future tests that were ordered for you today     Open Future Orders        Priority Expected Expires Ordered    AUDIOLOGY ADULT REFERRAL Routine  5/11/2019 11/12/2018    MRI Brain w & w/o contrast Routine  11/12/2019 11/12/2018            Who to contact     If you have questions or need follow up information about today's clinic visit or your schedule please contact [facility] directly at [phone].  Normal or non-critical lab and imaging results will be communicated to you by MyChart, letter or phone within 4 business days after the clinic has received the results. If you do not hear from us within 7 days, please contact the clinic through MyChart or phone. If you have a critical or abnormal lab result, we will notify you by phone as soon as possible.  Submit refill requests through [employer] or call your pharmacy and they will forward the refill request to us. Please allow 3 business days for your refill to be completed.          Additional Information About Your Visit        Care EveryWhere ID     This is your Care EveryWhere ID. This could be used by other organizations to access your [place] medical records  [id number]        Your Vitals Were     Pulse  "Temperature Height Last Period Pulse Oximetry BMI (Body Mass Index)    75 98.3  F (36.8  C) (Oral) 1.626 m (5' 4\") 10/15/2003 97% 41.02 kg/m2       Blood Pressure from Last 3 Encounters:   11/12/18 162/86   11/05/18 164/80   03/14/18 164/90    Weight from Last 3 Encounters:   11/12/18 108.4 kg (239 lb)   11/05/18 107 kg (236 lb)   03/14/18 106 kg (233 lb 9.6 oz)              We Performed the Following     BASIC METABOLIC PANEL (QUEST)     VENOUS COLLECTION          Today's Medication Changes          These changes are accurate as of 11/12/18 12:32 PM.  If you have any questions, ask your nurse or doctor.               Start taking these medicines.        Dose/Directions    lisinopril 10 MG tablet   Commonly known as:  PRINIVIL/ZESTRIL   Used for:  Benign essential hypertension   Started by:  Ev Bose PA        Dose:  10 mg   Take 1 tablet (10 mg) by mouth daily   Quantity:  90 tablet   Refills:  0            Where to get your medicines      These medications were sent to [id number] - [place], [place] - [address] [facility] [facility] [place] [address]  [address], [place] [address]     Phone:  [phone]     lisinopril 10 MG tablet                Primary Care Provider Office Phone # Fax #    Lynn Lim [phone] [address] [address]        Equal Access to Services     [facility] : Hadii tiffanie weathers hadasho Soomaali, waaxda luqadaha, qaybta kaalmada adeegyaburak, waxay idiin hayaan adeeg kharash la'aan . So [facility] [phone].    ATENCIÓN: Si habla español, tiene a lehman disposición servicios gratuitos de asistencia lingüística. Janessa al [phone].    We comply with applicable federal civil rights laws and [place] laws. We do not discriminate on the basis of race, color, national origin, age, disability, sex, sexual orientation, or gender identity.            Thank you!     Thank you for choosing [facility]  for your care. Our goal is always to provide you with excellent care. Hearing back from our patients is one way we can continue to improve our services. Please take a few minutes to complete the written survey that you may receive in the mail after your visit with us. Thank you!             Your Updated Medication List - Protect others around you: Learn how to safely use, store and throw away your medicines at www.disposemymeds.org.          This list is accurate as of 11/12/18 12:32 PM.  Always use your most recent med list.                   Brand Name Dispense Instructions for use Diagnosis    hydrocortisone 0.2 % cream    WESTCORT    45 g    Apply topically 2 times daily    Other eczema       ibuprofen 800 MG tablet    ADVIL/MOTRIN    60 tablet    Take 1 tablet (800 mg) by mouth every 12 hours as needed for moderate pain    S/P total knee arthroplasty, left       lisinopril 10 MG tablet    PRINIVIL/ZESTRIL    90 tablet    Take 1 tablet (10 mg) by mouth daily    Benign essential hypertension       multivitamin  with lutein Caps per capsule      Take 1 capsule by mouth daily    Primary osteoarthritis of right knee, Pre-op exam       PREVIDENT 5000 BOOSTER PLUS 1.1 % Pste   Generic drug:  Sodium Fluoride           sertraline 100 MG tablet    ZOLOFT    90 tablet    Take 1 tablet (100 mg) by mouth daily    Depression, major, recurrent, in complete remission (H)

## 2018-11-12 NOTE — PROGRESS NOTES
"Suture removal  Chief Complaint   Patient presents with     Staple Removal     6 staples on left hand pointer finger, cut open on bottom of her car       Subjective:  Mary Carrera who presents for staple removal.  They were placed on 11/5/18 following fall.  There have not been any problems with the suture site.    Pt is right handed - left index finger palmar surface    BP high today  Previously on lisinopril    Specifically denies chest pain, palpitations, dyspnea, or peripheral edema.         BP Readings from Last 6 Encounters:   11/12/18 162/86   11/05/18 164/80   03/14/18 164/90   01/25/18 128/73   09/13/17 112/56   09/07/17 132/78         Objective:  /86 (BP Location: Left arm, Patient Position: Sitting, Cuff Size: Adult Large)  Pulse 75  Temp 98.3  F (36.8  C) (Oral)  Ht 1.626 m (5' 4\")  Wt 108.4 kg (239 lb)  LMP 10/15/2003  SpO2 97%  BMI 41.02 kg/m2     There are 6 staples in the finger.  The site appears well healed.  They were removed without difficulty.  Steri-strips were applied at this time.      Heart: Regular Rate and Rhythm.  No murmurs or extra sounds noted.  Lung: Lungs are clear to auscultation bilaterally.  No crackles, wheezes, or rhonchi noted.          Assessment:  Staple Removal  Hypertension - NEW    Plan  The following information was discussed with the patient:     Keep Steri-Strips dry for 5-7 days.     If the edges of the Steri-Strips begin to come loose, trim the free ends with a pair of scissors. Do  not pull on the loose edges of the strips, as the cut could reopen.    Steri strips will usually fall off by themselves after 10-12 days.     If the Steri-Strips are still in place after 12 days, they can be removed by soaking them with 1/2  peroxide and 1/2 water, then gently lifting them off. Again, do not pull hard on the strips to  remove them.    Call the clinic right away if you notice:  Increased drainage or bleeding from the wound   Redness in or around the wound "   Foul odor or pus coming from the wound   Fever above 101.0 F or shaking chills      Follow-up in 4 weeks after restarting lisinopril   I reviewed the patient information handout from Up To Date on this medication including side effects with the patient.        Ev Bose

## 2018-11-12 NOTE — LETTER
November 13, 2018      Mary Carrera  [address]  [place] [address]        Dear ,    We are writing to inform you of your test results.    Glucose is slightly elevated because you weren't fasting, this is normal.  Kidney function normal.  Electrolytes are within normal limits.      Resulted Orders   BASIC METABOLIC PANEL (QUEST)   Result Value Ref Range    Glucose 115 (H) 65 - 99 mg/dL      Comment:                    Fasting reference interval     For someone without known diabetes, a glucose value  between 100 and 125 mg/dL is consistent with  prediabetes and should be confirmed with a  follow-up test.         Urea Nitrogen 13 7 - 25 mg/dL    Creatinine 0.79 0.50 - 0.99 mg/dL      Comment:      For patients >49 years of age, the reference limit  for Creatinine is approximately 13% higher for people  identified as -American.         GFR Estimate 77 > OR = 60 mL/min/1.73m2    EGFR African American 90 > OR = 60 mL/min/1.73m2    BUN/Creatinine Ratio NOT APPLICABLE 6 - 22 (calc)    Sodium 142 135 - 146 mmol/L    Potassium 3.9 3.5 - 5.3 mmol/L    Chloride 106 98 - 110 mmol/L    Carbon Dioxide 23 20 - 32 mmol/L    Calcium 9.1 8.6 - 10.4 mg/dL       If you have any questions or concerns, please call the clinic at the number listed above.       Sincerely,        LEATHA Johnson

## 2018-11-12 NOTE — NURSING NOTE
Mary Carrera is here today for staple removal/    Pre-visit Screening:  Immunizations:  up to date  Colonoscopy:  is up to date  Mammogram: is up to date  Asthma Action Test/Plan:  NA  PHQ9:  NA  GAD7:  NA  Questioned patient about current smoking habits Pt. has never smoked.  Ok to leave detailed message on voice mail for today's visit only Yes, phone # [phone]

## 2018-11-13 LAB
BUN SERPL-MCNC: 13 MG/DL (ref 7–25)
BUN/CREATININE RATIO: ABNORMAL (CALC) (ref 6–22)
CALCIUM SERPL-MCNC: 9.1 MG/DL (ref 8.6–10.4)
CHLORIDE SERPLBLD-SCNC: 106 MMOL/L (ref 98–110)
CO2 SERPL-SCNC: 23 MMOL/L (ref 20–32)
CREAT SERPL-MCNC: 0.79 MG/DL (ref 0.5–0.99)
EGFR AFRICAN AMERICAN - QUEST: 90 ML/MIN/1.73M2
GFR SERPL CREATININE-BSD FRML MDRD: 77 ML/MIN/1.73M2
GLUCOSE - QUEST: 115 MG/DL (ref 65–99)
POTASSIUM SERPL-SCNC: 3.9 MMOL/L (ref 3.5–5.3)
SODIUM SERPL-SCNC: 142 MMOL/L (ref 135–146)

## 2019-01-11 ENCOUNTER — OFFICE VISIT (OUTPATIENT)
Dept: FAMILY MEDICINE | Facility: CLINIC | Age: 69
End: 2019-01-11

## 2019-01-11 VITALS
BODY MASS INDEX: 39.71 KG/M2 | HEIGHT: 64 IN | HEART RATE: 72 BPM | TEMPERATURE: 98.4 F | SYSTOLIC BLOOD PRESSURE: 134 MMHG | OXYGEN SATURATION: 97 % | DIASTOLIC BLOOD PRESSURE: 82 MMHG | RESPIRATION RATE: 29 BRPM | WEIGHT: 232.6 LBS

## 2019-01-11 DIAGNOSIS — Z13.1 SCREENING FOR DIABETES MELLITUS: ICD-10-CM

## 2019-01-11 DIAGNOSIS — I10 ESSENTIAL HYPERTENSION: ICD-10-CM

## 2019-01-11 DIAGNOSIS — E66.01 MORBID OBESITY (H): ICD-10-CM

## 2019-01-11 DIAGNOSIS — Z79.899 ENCOUNTER FOR LONG-TERM (CURRENT) USE OF MEDICATIONS: ICD-10-CM

## 2019-01-11 DIAGNOSIS — F10.21 ALCOHOL DEPENDENCE IN REMISSION (H): ICD-10-CM

## 2019-01-11 DIAGNOSIS — Z13.220 SCREENING CHOLESTEROL LEVEL: ICD-10-CM

## 2019-01-11 DIAGNOSIS — F33.42 DEPRESSION, MAJOR, RECURRENT, IN COMPLETE REMISSION (H): Primary | ICD-10-CM

## 2019-01-11 DIAGNOSIS — D33.3 BENIGN NEOPLASM OF CRANIAL NERVE (H): ICD-10-CM

## 2019-01-11 PROCEDURE — 36415 COLL VENOUS BLD VENIPUNCTURE: CPT | Performed by: FAMILY MEDICINE

## 2019-01-11 PROCEDURE — 99214 OFFICE O/P EST MOD 30 MIN: CPT | Performed by: FAMILY MEDICINE

## 2019-01-11 RX ORDER — LISINOPRIL 10 MG/1
10 TABLET ORAL DAILY
Qty: 90 TABLET | Refills: 1 | Status: SHIPPED | OUTPATIENT
Start: 2019-01-11 | End: 2019-05-09 | Stop reason: ALTCHOICE

## 2019-01-11 RX ORDER — SERTRALINE HYDROCHLORIDE 100 MG/1
100 TABLET, FILM COATED ORAL DAILY
Qty: 90 TABLET | Refills: 3 | Status: SHIPPED | OUTPATIENT
Start: 2019-01-11 | End: 2020-02-07

## 2019-01-11 SDOH — ECONOMIC STABILITY: FOOD INSECURITY: WITHIN THE PAST 12 MONTHS, THE FOOD YOU BOUGHT JUST DIDN'T LAST AND YOU DIDN'T HAVE MONEY TO GET MORE.: NEVER TRUE

## 2019-01-11 SDOH — ECONOMIC STABILITY: FOOD INSECURITY: WITHIN THE PAST 12 MONTHS, YOU WORRIED THAT YOUR FOOD WOULD RUN OUT BEFORE YOU GOT MONEY TO BUY MORE.: NEVER TRUE

## 2019-01-11 SDOH — ECONOMIC STABILITY: TRANSPORTATION INSECURITY
IN THE PAST 12 MONTHS, HAS THE LACK OF TRANSPORTATION KEPT YOU FROM MEDICAL APPOINTMENTS OR FROM GETTING MEDICATIONS?: NO

## 2019-01-11 SDOH — ECONOMIC STABILITY: TRANSPORTATION INSECURITY
IN THE PAST 12 MONTHS, HAS LACK OF TRANSPORTATION KEPT YOU FROM MEETINGS, WORK, OR FROM GETTING THINGS NEEDED FOR DAILY LIVING?: NO

## 2019-01-11 SDOH — ECONOMIC STABILITY: INCOME INSECURITY: HOW HARD IS IT FOR YOU TO PAY FOR THE VERY BASICS LIKE FOOD, HOUSING, MEDICAL CARE, AND HEATING?: NOT HARD AT ALL

## 2019-01-11 ASSESSMENT — PATIENT HEALTH QUESTIONNAIRE - PHQ9: SUM OF ALL RESPONSES TO PHQ QUESTIONS 1-9: 0

## 2019-01-11 ASSESSMENT — MIFFLIN-ST. JEOR: SCORE: 1575.07

## 2019-01-11 NOTE — LETTER
January 14, 2019      Mary Carrera  [address]  [place] [address]        Dear ,    We are writing to inform you of your test results.    Normal metabolic panel.  Marked elevation in total and LDL (bad) cholesterol putting you at risk for future heart attack or stroke.    Please return to discuss starting cholesterol lowering medication. See handout    Resulted Orders   LIPID PANEL   Result Value Ref Range    Cholesterol 268 (H) <200 mg/dL    HDL Cholesterol 46 (L) >50 mg/dL    Triglycerides 126 <150 mg/dL    LDL Cholesterol Calculated 195 (H) mg/dL (calc)      Comment:      LDL-C levels > or = 190 mg/dL may indicate familial   hypercholesterolemia (FH). Clinical assessment and   measurement of blood lipid levels should be   considered for all first degree relatives of   patients with an FH diagnosis.   For questions about testing for familial  hypercholesterolemia, please call [facility]  Client Services at [phone].  Alberto BELTRÁN, et al. J National Lipid Association   Recommendations for Patient-Centered Management of   Dyslipidemia: Part 1 Journal of Clinical Lipidology   2015;9(2), 129-169.  Reference range: <100     Desirable range <100 mg/dL for primary prevention;    <70 mg/dL for patients with CHD or diabetic patients   with > or = 2 CHD risk factors.     LDL-C is now calculated using the Ahmet-Vicente   calculation, which is a validated novel method providing   better accuracy than the Friedewald equation in the   estimation of LDL-C.   Ahmet SS et al. RICHARD. 2013;310(19): 6323-1889   (http://education.Justin.TV/faq/IZD307)      Cholesterol/HDL Ratio 5.8 (H) <5.0 (calc)    Non HDL Cholesterol 222 (H) <130 mg/dL (calc)      Comment:      Non-HDL level > or = 220 is very high and may indicate   genetic familial hypercholesterolemia (FH). Clinical   assessment and measurement of blood lipid levels   should be considered for all first-degree relatives   of patients with  an FH diagnosis.     For patients with diabetes plus 1 major ASCVD risk   factor, treating to a non-HDL-C goal of <100 mg/dL   (LDL-C of <70 mg/dL) is considered a therapeutic   option.     COMPREHENSIVE METABOLIC PANEL   Result Value Ref Range    Glucose 91 65 - 99 mg/dL      Comment:                    Fasting reference interval         Urea Nitrogen 16 7 - 25 mg/dL    Creatinine 0.76 0.50 - 0.99 mg/dL      Comment:      For patients >49 years of age, the reference limit  for Creatinine is approximately 13% higher for people  identified as -American.         GFR Estimate 81 > OR = 60 mL/min/1.73m2    EGFR African American 94 > OR = 60 mL/min/1.73m2    BUN/Creatinine Ratio NOT APPLICABLE 6 - 22 (calc)    Sodium 140 135 - 146 mmol/L    Potassium 3.9 3.5 - 5.3 mmol/L    Chloride 108 98 - 110 mmol/L    Carbon Dioxide 23 20 - 32 mmol/L    Calcium 9.5 8.6 - 10.4 mg/dL    Protein Total 7.2 6.1 - 8.1 g/dL    Albumin 4.6 3.6 - 5.1 g/dL    Globulin Calculated 2.6 1.9 - 3.7 g/dL (calc)    A/G Ratio 1.8 1.0 - 2.5 (calc)    Bilirubin Total 0.7 0.2 - 1.2 mg/dL    Alkaline Phosphatase 47 33 - 130 U/L    AST 35 10 - 35 U/L    ALT 33 (H) 6 - 29 U/L       If you have any questions or concerns, please call the clinic at the number listed above.       Sincerely,        Lynn Lim MD

## 2019-01-11 NOTE — PROGRESS NOTES
"Two issues:    1. Hypertension  2. Depression    1.     SUBJECTIVE:  Mary Carrera is an 68 year old female who presents for evaluation of   hypertension. She indicates that she is feeling well and   denies any symptoms referable to her elevated blood pressure.   Specifically denies chest pain, palpitations, dyspnea, orthopnea,   PND or peripheral edema. Current medication regimen is as listed   below. Patient denies any side effects of medication.    Family history: positive for hypertension and cardiovascular disease  Age at onset of elevated blood pressure: 56  Cardiovascular risk factors: family history, hypertension, obesity and stress  Use of agents associated with hypertension: none  History of renal disease: negative  History of flank trauma: negative    Current Outpatient Medications   Medication     ibuprofen (ADVIL/MOTRIN) 800 MG tablet     lisinopril (PRINIVIL/ZESTRIL) 10 MG tablet     multivitamin  with lutein (OCUVITE WITH LTEIN) CAPS     sertraline (ZOLOFT) 100 MG tablet     No current facility-administered medications for this visit.      Allergies   Allergen Reactions     No Known Allergies        Social History     Tobacco Use     Smoking status: Never Smoker     Smokeless tobacco: Never Used   Substance Use Topics     Alcohol use: Yes     Alcohol/week: 2.5 oz     Types: 5 Standard drinks or equivalent per week     Comment: wine       OBJECTIVE:  /82 (BP Location: Right arm, Patient Position: Sitting, Cuff Size: Adult Large)   Pulse 72   Temp 98.4  F (36.9  C) (Oral)   Ht 1.626 m (5' 4\")   Wt 105.5 kg (232 lb 9.6 oz)   LMP 10/15/2003   SpO2 97%   BMI 39.93 kg/m    Repeat BP R arm seated = 134/82 with regular size cuff.  Fundi: deferred  Thyroid: normal to inspection and palpation  Lungs: negative, Percussion normal. Good diaphragmatic excursion. Lungs clear  Heart: negative, PMI normal. No lifts, heaves, or thrills. RRR. No murmurs, clicks gallops or rub  Peripheral pulses: " radial=4/4, femoral=4/4, popliteal=4/4, dorsalis pedis=4/4,  Abd; The abdomen is soft without tenderness, guarding, mass or organomegaly. Bowel sounds are normal. No CVA tenderness or inguinal adenopathy noted.  BMI : Body mass index is 39.93 kg/m .    ASSESSMENT:(F33.42) Depression, major, recurrent, in complete remission (H)  (primary encounter diagnosis)  Plan: sertraline (ZOLOFT) 100 MG tablet        I've explained to her that drugs of the SSRI class can have side effects such as weight gain, sexual dysfunction, insomnia, headache, nausea. These medications are generally effective at alleviating symptoms of anxiety and/or depression. Let me know if significant side effects do occur.  Refilled one year/ see below    (I10) Essential hypertension  Plan: VENIPUNC FNGR,HEEL,EAR [84951], COMPREHENSIVE         METABOLIC PANEL, lisinopril (PRINIVIL/ZESTRIL)         10 MG tablet        1)  Medication: continue current medication regimen unchanged  2)  Dietary sodium restriction  3)  Regular aerobic exercise  4)  Recheck in 6 months, sooner should new symptoms or   problems arise.    Patient Education: Reviewed risks of hypertension and principles of   treatment.        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   SUBJECTIVE:  Mary Carrera is an 68 year old female who presents for follow-up   of depressive symptoms.  Initially evaluated in her 40's.  Notes from that visit reviewed.  Current symptoms include   none. Symptoms that have subjectively improved include depressed mood, hopelessness, diminished interest or pleasure in activities, weight gain, psychomotor retardation (slowness of thought and reaction), fatigue, anxiety, irritablility.  Previous and current treatment modalities   employed include individual therapy and medication(s) Zoloft (sertraline).     Organic causes of depression present: menopause    Current Outpatient Medications   Medication     ibuprofen (ADVIL/MOTRIN) 800 MG tablet     lisinopril (PRINIVIL/ZESTRIL) 10 MG tablet     multivitamin  with lutein (OCUVITE WITH LTEIN) CAPS     sertraline (ZOLOFT) 100 MG tablet     No current facility-administered medications for this visit.      Allergies   Allergen Reactions     No Known Allergies      Side effects of medication:   none    Social History     Tobacco Use     Smoking status: Never Smoker     Smokeless tobacco: Never Used   Substance Use Topics     Alcohol use: Yes     Alcohol/week: 2.5 oz     Types: 5 Standard drinks or equivalent per week     Comment: wine         Neurologic: MRI follow up on acoustic neuroma  Psychiatric: excessive stress-finance  Endocrine: negative    OBJECTIVE:  /82 (BP Location: Right arm, Patient Position: Sitting, Cuff Size: Adult Large)   Pulse 72   Temp 98.4  F (36.9  C) (Oral)   Ht 1.626 m (5' 4\")   Wt 105.5 kg (232 lb 9.6 oz)   LMP 10/15/2003   SpO2 97%   BMI 39.93 kg/m    Mental Status Examination  Posture and motor behavior: negative  Dress, grooming, personal hygiene: negative  Facial expression: negative  Speech: negative  Mood: negative  Coherency and relevance of thought: negative  Thought " content: negative  Perceptions: negative  Orientation: negative  Attention and concentration: negative  Memory: : negative  Information: negative  Vocabulary: negative  Abstract reasoning: negative  Judgment: negative    General appearance: healthy, alert, no distress, cooperative, smiling and over weight  Eyes: conjunctivae/corneas clear. PERRL, EOM's intact. Fundi benign  Ears: negative  Oropharynx: Lips, mucosa, and tongue normal. Teeth and gums normal.  Neck: Neck supple. No adenopathy. Thyroid symmetric, normal size,, Carotids without bruits.  Lungs: negative, Percussion normal. Good diaphragmatic excursion. Lungs clear  Heart: negative, PMI normal. No lifts, heaves, or thrills. RRR. No murmurs, clicks gallops or rub  Abdomen: Abdomen soft, non-tender. BS normal. No masses, organomegaly  Neuro: Gait normal. Reflexes normal and symmetric. Sensation grossly WNL.

## 2019-01-11 NOTE — NURSING NOTE
Mary Block is here today for a fasting med recheck.    Pre-visit Screening:  Immunizations:  up to date  Colonoscopy:  is up to date  Mammogram: is up to date  Asthma Action Test/Plan:  NA  PHQ9:  NA  GAD7:  NA  Questioned patient about current smoking habits Pt. has never smoked.  Ok to leave detailed message on voice mail for today's visit only Yes, phone # [phone]

## 2019-01-11 NOTE — PATIENT INSTRUCTIONS
1)  Medication: continue current medication regimen unchanged  2)  Dietary sodium restriction  3)  Regular aerobic exercise  4)  Recheck in 6 months FOR BP CHECK, sooner should new symptoms or   problems arise.    Patient Education: Reviewed risks of hypertension and principles of   Treatment.    MRI ordered in [place], check with Dr Marquez    Colonoscopy 5/2019

## 2019-01-12 LAB
ALBUMIN SERPL-MCNC: 4.6 G/DL (ref 3.6–5.1)
ALBUMIN/GLOB SERPL: 1.8 (CALC) (ref 1–2.5)
ALP SERPL-CCNC: 47 U/L (ref 33–130)
ALT SERPL-CCNC: 33 U/L (ref 6–29)
AST SERPL-CCNC: 35 U/L (ref 10–35)
BILIRUB SERPL-MCNC: 0.7 MG/DL (ref 0.2–1.2)
BUN SERPL-MCNC: 16 MG/DL (ref 7–25)
BUN/CREATININE RATIO: ABNORMAL (CALC) (ref 6–22)
CALCIUM SERPL-MCNC: 9.5 MG/DL (ref 8.6–10.4)
CHLORIDE SERPLBLD-SCNC: 108 MMOL/L (ref 98–110)
CHOLEST SERPL-MCNC: 268 MG/DL
CHOLEST/HDLC SERPL: 5.8 (CALC)
CO2 SERPL-SCNC: 23 MMOL/L (ref 20–32)
CREAT SERPL-MCNC: 0.76 MG/DL (ref 0.5–0.99)
EGFR AFRICAN AMERICAN - QUEST: 94 ML/MIN/1.73M2
GFR SERPL CREATININE-BSD FRML MDRD: 81 ML/MIN/1.73M2
GLOBULIN, CALCULATED - QUEST: 2.6 G/DL (CALC) (ref 1.9–3.7)
GLUCOSE - QUEST: 91 MG/DL (ref 65–99)
HDLC SERPL-MCNC: 46 MG/DL
LDLC SERPL CALC-MCNC: 195 MG/DL (CALC)
NONHDLC SERPL-MCNC: 222 MG/DL (CALC)
POTASSIUM SERPL-SCNC: 3.9 MMOL/L (ref 3.5–5.3)
PROT SERPL-MCNC: 7.2 G/DL (ref 6.1–8.1)
SODIUM SERPL-SCNC: 140 MMOL/L (ref 135–146)
TRIGL SERPL-MCNC: 126 MG/DL

## 2019-01-29 ENCOUNTER — HOSPITAL ENCOUNTER (OUTPATIENT)
Dept: MRI IMAGING | Facility: CLINIC | Age: 69
Discharge: HOME | End: 2019-01-29
Attending: OTOLARYNGOLOGY | Admitting: OTOLARYNGOLOGY
Payer: MEDICARE

## 2019-01-29 DIAGNOSIS — D33.3 ACOUSTIC NEUROMA (H): ICD-10-CM

## 2019-01-29 PROCEDURE — A9585 GADOBUTROL INJECTION: HCPCS | Performed by: OTOLARYNGOLOGY

## 2019-01-29 PROCEDURE — 70543 MRI ORBT/FAC/NCK W/O &W/DYE: CPT

## 2019-01-29 PROCEDURE — 25500064 ZZH RX 255 OP 636: Performed by: OTOLARYNGOLOGY

## 2019-01-29 RX ORDER — GADOBUTROL 604.72 MG/ML
10 INJECTION INTRAVENOUS ONCE
Status: COMPLETED | OUTPATIENT
Start: 2019-01-29 | End: 2019-01-29

## 2019-01-29 RX ADMIN — GADOBUTROL 10 ML: 604.72 INJECTION INTRAVENOUS at 15:24

## 2019-02-06 ENCOUNTER — TELEPHONE (OUTPATIENT)
Dept: FAMILY MEDICINE | Facility: CLINIC | Age: 69
End: 2019-02-06

## 2019-02-06 ENCOUNTER — TELEPHONE (OUTPATIENT)
Dept: OTOLARYNGOLOGY | Facility: CLINIC | Age: 69
End: 2019-02-06

## 2019-02-06 DIAGNOSIS — E78.2 MIXED HYPERLIPIDEMIA: Primary | ICD-10-CM

## 2019-02-06 NOTE — TELEPHONE ENCOUNTER
[facility] Call [facility]    Phone Message    May a detailed message be left on voicemail: yes    Reason for Call: Other: Please call the Pt as soon as possible with her recent MRI results. She is surprised she hasn't heard yet.     Action Taken: Message routed to:  [facility] ([facility]): see above

## 2019-02-06 NOTE — TELEPHONE ENCOUNTER
Vane called to say that she has recently been diagnosed with hyperlipidemia.  She is wondering if you could put her on a prescription without her coming in for an office visit.    Please advise patient's phone #[phone]

## 2019-02-07 RX ORDER — ATORVASTATIN CALCIUM 10 MG/1
10 TABLET, FILM COATED ORAL DAILY
Qty: 30 TABLET | Refills: 1 | Status: SHIPPED | OUTPATIENT
Start: 2019-02-07 | End: 2019-05-09

## 2019-02-07 NOTE — TELEPHONE ENCOUNTER
I would be glad to send a 30 day prescription with one refill to start. After 4-6 weeks she should return for fasting labs and a visit.  Encourage her to review all risks and benefits/ side effects with the pharmacists when she picks up the medications    Which pharmacy?.

## 2019-02-07 NOTE — TELEPHONE ENCOUNTER
She uses the [facility] in [place] on [address] which is on file.  She will go over the med with the pharmacist and will schedule an appt in 4-6 weeks for fasting lab and med check

## 2019-02-14 NOTE — TELEPHONE ENCOUNTER
Left message w/ direct contact information for pt to call back to discuss MRI results and to schedule appt w/ Dr. Nissen.      Tasneem Greene LPN

## 2019-02-14 NOTE — TELEPHONE ENCOUNTER
Pt returned phone call to discuss recent MRI and to schedule appt. Informed pt that Dr. Nissen did not send me results of the MRI but would like to discuss them at an office visit. Pt okay with this plan and stated that she had an appt scheduled w/ Dr. Nissen on Tuesday, 02/26/19 at 1515. Informed pt that she would need to get a new hearing test prior to the visit. Scheduled pt for a WIN on Tuesday, 02/26/19 at 1430. Pt verified date, times, and location. Sent message to clinic coordinator pool to send pt reminder letter for appts.      Tasneem Greene LPN

## 2019-02-26 ENCOUNTER — OFFICE VISIT (OUTPATIENT)
Dept: AUDIOLOGY | Facility: CLINIC | Age: 69
End: 2019-02-26
Payer: MEDICARE

## 2019-02-26 ENCOUNTER — OFFICE VISIT (OUTPATIENT)
Dept: OTOLARYNGOLOGY | Facility: CLINIC | Age: 69
End: 2019-02-26
Payer: MEDICARE

## 2019-02-26 DIAGNOSIS — R42 DIZZINESS: ICD-10-CM

## 2019-02-26 DIAGNOSIS — H91.90 HEARING LOSS, UNSPECIFIED HEARING LOSS TYPE, UNSPECIFIED LATERALITY: ICD-10-CM

## 2019-02-26 DIAGNOSIS — H91.92 ACQUIRED DEAFNESS OF LEFT EAR: ICD-10-CM

## 2019-02-26 DIAGNOSIS — H90.3 SENSORY HEARING LOSS, BILATERAL: Primary | ICD-10-CM

## 2019-02-26 DIAGNOSIS — D33.3 LEFT ACOUSTIC NEUROMA (H): ICD-10-CM

## 2019-02-26 ASSESSMENT — PAIN SCALES - GENERAL: PAINLEVEL: NO PAIN (0)

## 2019-02-26 NOTE — PATIENT INSTRUCTIONS
1.  You were seen in the ENT Clinic today by Dr. Nissen.  If you have any questions or concerns after your appointment, please call [phone]. Press option #1 for scheduling related needs. Press option #3 for Nurse advice.    2.  Plan is to return to clinic in 2 years with an Audiogram and Tympanogram prior to appointment, or sooner with any concerns.      Tasneem Greene LPN  [facility]  [phone]

## 2019-02-26 NOTE — PROGRESS NOTES
AUDIOLOGY REPORT    SUMMARY: Audiology visit completed. See audiogram for results.      RECOMMENDATIONS: Follow-up with ENT.      Ame Hays, CCC-A  Licensed Audiologist  [place] #[id number]

## 2019-02-26 NOTE — NURSING NOTE
Chief Complaint   Patient presents with     RECHECK     follow up acoustic neuroma      Ahmet Mcconnell LPN

## 2019-02-26 NOTE — LETTER
2/26/2019       RE: Mary Carrera  [address]  [place] [address]     Dear Colleague,    Thank you for referring your patient, Mary Carrera, to the [facility] E[facility] AND [facility]ROAT at [facility]. Please see a copy of my visit note below.    Dear Lynn Bennett:    I had the pleasure of meeting Mary Carrera in consultation today at the [facility] at your request.    CHIEF COMPLAINT: Left acoustic neuroma    HISTORY OF PRESENT ILLNESS: Patient is a 68-year-old in today for assessment of lightheadedness and double vision.  She has had an acoustic neuroma removed on 2 occasions in the past and is concerned about that recurring.  Over the past month, she has noticed some lightheadedness mainly when she is getting up such as bending over and tying her shoes or up from bed.  It only lasts for a very short time but is concerning for her.  She was found to have high blood pressure and has been on some recent medications for that.  Also was started on some cholesterol medications.  She is to follow-up with her local physician in 6 weeks to recheck her blood pressure.  From an ear standpoint, she has hearing only in the right ear.  She feels that hearing has been stable.  She has no hearing in the left ear following an acoustic neuroma surgery.  She still has tenderness on the left side and that has been stable.  She denies any vertigo or dizziness other than his recent lightheadedness.  She has not had any pain or drainage in either ear.  She denies any dysphagia, hoarseness, facial paresthesias.  There has not been any extremity weakness or paresthesias.  She had her initial left acoustic neuroma removed through a retrosigmoid approach in 1995.  Initially left hearing was preserved but that subsequently was slowly lost.  She then was found to have a recurrent left acoustic neuroma which was found on an MRI scan in 2006 for headaches.   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  Her last MRI scan was in 2011 which showed a 4 mm enhancing area in the surgical site, uncertain whether it was scar tissue or small residual tumor.  It was recommended she follow that along.  She was lost to follow-up until recently with these new symptoms, a recent follow-up MRI scan shows enhancement in the surgical site at about 4 mm.  I am in the process of obtaining the old scan for a direct comparison, but by the report it sounds like it is unchanged.    ALLERGIES:    Allergies   Allergen Reactions     No Known Allergies        HABITS: Social History    Substance and Sexual Activity      Alcohol use: Yes        Alcohol/week: 2.5 oz        Types: 5 Standard drinks or equivalent per week        Comment: wine     History   Smoking Status     Never Smoker   Smokeless Tobacco     Never Used         PAST MEDICAL HISTORY: Please see today's intake form (for the remainder of the PMH) which I reviewed and signed.  Past Medical History:   Diagnosis Date     Arthritis      Chronic depressive personality disorder      Depression, major, recurrent, in complete remission (H) 8/16/2013     Hearing loss      Other and unspecified alcohol dependence, in remission        FAMILY HISTORY/SOCIAL HISTORY:   Family History   Problem Relation Age of Onset     Heart Disease Mother      Alcohol/Drug Father      Cancer No family hx of      Diabetes No family hx of       Social History     Socioeconomic History     Marital status: Single     Spouse name: Not on file     Number of children: 3     Years of education: 16     Highest education level: Not on file   Occupational History     Occupation: Paraprofessional     Employer: [employer]   Social Needs     Financial resource strain: Not hard at all     Food insecurity:     Worry: Never true     Inability: Never true     Transportation needs:     Medical: No     Non-medical: No   Tobacco Use      Smoking status: Never Smoker     Smokeless tobacco: Never Used   Substance and Sexual Activity     Alcohol use: Yes     Alcohol/week: 2.5 oz     Types: 5 Standard drinks or equivalent per week     Comment: wine     Drug use: No     Sexual activity: Not Currently     Partners: Male     Birth control/protection: Surgical, Post-menopausal   Lifestyle     Physical activity:     Days per week: Not on file     Minutes per session: Not on file     Stress: Not on file   Relationships     Social connections:     Talks on phone: Not on file     Gets together: Not on file     Attends Synagogue service: Not on file     Active member of club or organization: Not on file     Attends meetings of clubs or organizations: Not on file     Relationship status: Not on file     Intimate partner violence:     Fear of current or ex partner: Not on file     Emotionally abused: Not on file     Physically abused: Not on file     Forced sexual activity: Not on file   Other Topics Concern      Service No     Blood Transfusions No     Caffeine Concern No     Occupational Exposure No     Hobby Hazards Not Asked     Sleep Concern Not Asked     Stress Concern Yes     Comment: divorce and mental health issues     Weight Concern Yes     Special Diet Yes     Comment: low fat     Back Care No     Exercise Yes     Bike Helmet Not Asked     Seat Belt No     Self-Exams Yes   Social History Narrative     Not on file       REVIEW OF SYSTEMS: Patient Supplied Answers to Review of Systems   ENT ROS 2/26/2019   Neurology Dizzy spells   Eyes Double vision   Ears, Nose, Throat Hearing loss       The remainder of the 10 point ROS is negative    PHYSICIAL EXAMINATION:  Constitutional: The patient was well-groomed and in no acute distress.   Skin: Warm and pink.  Psychiatric: The patient's affect was calm, cooperative, and appropriate.   Respiratory: Breathing comfortably without stridor or exertion of accessory muscles.  Eyes: Pupils were equal and  reactive. Extraocular movement intact.   Head: Normocephalic and atraumatic. No lesions or scars.  Ears: Patient placed under the microscope for microscopic evaluation and cleaning of cerumen which was obscuring full visualization of both TMs. Under high power magnification, the right ear was examined and cleaned of cerumen using curet, alligator forceps, and suction.  After cleaning, TM is fully visualized and has normal position with normal middle ear aeration. The left ear was then cleaned and inspected using microscope, instruments and similar techniques. After cleaning of cerumen, the TM has normal position with normal aeration to middle ear.  Nose: Sinuses were nontender. Anterior rhinoscopy revealed midline septum and absence of purulence or polyps.  Oral Cavity: Normal tongue, floor of mouth, buccal mucosa, and palate. No lesions or masses on inspection or palpation. No abnormal lymph tissue in the oropharynx.   Neck: The parotid is soft without masses. Supple with normal laryngeal and tracheal landmarks.   Lymphatic: There is no palpable lymphadenopathy or other masses in the neck.   Neurologic: Alert and oriented x 3. Cranial nerves III-XI within normal limits. Voice quality normal.  Cerebellar Function Tests:  Grossly normal    Audiogram: Audiogram shows a high-frequency sensorineural hearing loss in the right ear, slightly worse but maintaining excellent discrimination at 96%.  She has no hearing in the left ear as expected.  Subjectively she feels her right hearing is unchanged.      IMPRESSION AND PLAN:   1. Right high-frequency sensorineural hearing loss: Discussed this with her in detail today.  Discussed options for hearing aid which she will pursue at her discretion in place of location.  She needs to protect her ears from noise exposure which she is rarely around.  Recommend she monitor this closer than she has been, follow-up audiogram in 1 year.  2. Dizziness: Her dizziness which she describes  as lightheadedness, I feel likely represents postural hypotension.  We discussed this in detail and she will follow-up with her family physician as recommended in 6 weeks.  She had any worsening or other balance issues arise she will return.  3. Diplopia: Recent episode of double vision only lasted for seconds and was just one time.  Recommended we just monitor that for now and she will follow-up with her family physician or ophthalmologist if that returns.  Reassured her I do not think this is at all related to her acoustic neuroma removal.  4. Left deafness: Secondary to acoustic neuroma and previous surgery.  Option for a Bi-cros hearing aid discussed if she desires, she will pursue that at her discretion in place of location.    Patient is to follow-up in 1 year to monitor hearing or sooner if any symptoms or problems.  Would recommend consideration for one more follow-up MRI scan in 2-3 years.    Thank you very much for the opportunity to participate in the care of your patient.    Rick L Nissen MD

## 2019-02-27 NOTE — PROGRESS NOTES
Dear Lynn Bennett:    I had the pleasure of meeting Mary Carrera in consultation today at the [facility] at your request.    CHIEF COMPLAINT: Left acoustic neuroma    HISTORY OF PRESENT ILLNESS: Patient is a 68-year-old in today for assessment of lightheadedness and double vision.  She has had an acoustic neuroma removed on 2 occasions in the past and is concerned about that recurring.  Over the past month, she has noticed some lightheadedness mainly when she is getting up such as bending over and tying her shoes or up from bed.  It only lasts for a very short time but is concerning for her.  She was found to have high blood pressure and has been on some recent medications for that.  Also was started on some cholesterol medications.  She is to follow-up with her local physician in 6 weeks to recheck her blood pressure.  From an ear standpoint, she has hearing only in the right ear.  She feels that hearing has been stable.  She has no hearing in the left ear following an acoustic neuroma surgery.  She still has tenderness on the left side and that has been stable.  She denies any vertigo or dizziness other than his recent lightheadedness.  She has not had any pain or drainage in either ear.  She denies any dysphagia, hoarseness, facial paresthesias.  There has not been any extremity weakness or paresthesias.  She had her initial left acoustic neuroma removed through a retrosigmoid approach in 1995.  Initially left hearing was preserved but that subsequently was slowly lost.  She then was found to have a recurrent left acoustic neuroma which was found on an MRI scan in 2006 for headaches.  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  Her last MRI scan was in 2011 which showed a 4 mm enhancing area in the surgical site, uncertain whether it was scar tissue or small residual tumor.  It was recommended she follow that along.  She was lost to  follow-up until recently with these new symptoms, a recent follow-up MRI scan shows enhancement in the surgical site at about 4 mm.  I am in the process of obtaining the old scan for a direct comparison, but by the report it sounds like it is unchanged.    ALLERGIES:    Allergies   Allergen Reactions     No Known Allergies        HABITS: Social History    Substance and Sexual Activity      Alcohol use: Yes        Alcohol/week: 2.5 oz        Types: 5 Standard drinks or equivalent per week        Comment: wine     History   Smoking Status     Never Smoker   Smokeless Tobacco     Never Used         PAST MEDICAL HISTORY: Please see today's intake form (for the remainder of the PMH) which I reviewed and signed.  Past Medical History:   Diagnosis Date     Arthritis      Chronic depressive personality disorder      Depression, major, recurrent, in complete remission (H) 8/16/2013     Hearing loss      Other and unspecified alcohol dependence, in remission        FAMILY HISTORY/SOCIAL HISTORY:   Family History   Problem Relation Age of Onset     Heart Disease Mother      Alcohol/Drug Father      Cancer No family hx of      Diabetes No family hx of       Social History     Socioeconomic History     Marital status: Single     Spouse name: Not on file     Number of children: 3     Years of education: 16     Highest education level: Not on file   Occupational History     Occupation: Paraprofessional     Employer: [employer]   Social Needs     Financial resource strain: Not hard at all     Food insecurity:     Worry: Never true     Inability: Never true     Transportation needs:     Medical: No     Non-medical: No   Tobacco Use     Smoking status: Never Smoker     Smokeless tobacco: Never Used   Substance and Sexual Activity     Alcohol use: Yes     Alcohol/week: 2.5 oz     Types: 5 Standard drinks or equivalent per week     Comment: wine     Drug use: No     Sexual activity: Not Currently     Partners: Male      Birth control/protection: Surgical, Post-menopausal   Lifestyle     Physical activity:     Days per week: Not on file     Minutes per session: Not on file     Stress: Not on file   Relationships     Social connections:     Talks on phone: Not on file     Gets together: Not on file     Attends Catholic service: Not on file     Active member of club or organization: Not on file     Attends meetings of clubs or organizations: Not on file     Relationship status: Not on file     Intimate partner violence:     Fear of current or ex partner: Not on file     Emotionally abused: Not on file     Physically abused: Not on file     Forced sexual activity: Not on file   Other Topics Concern      Service No     Blood Transfusions No     Caffeine Concern No     Occupational Exposure No     Hobby Hazards Not Asked     Sleep Concern Not Asked     Stress Concern Yes     Comment: divorce and mental health issues     Weight Concern Yes     Special Diet Yes     Comment: low fat     Back Care No     Exercise Yes     Bike Helmet Not Asked     Seat Belt No     Self-Exams Yes   Social History Narrative     Not on file       REVIEW OF SYSTEMS: Patient Supplied Answers to Review of Systems   ENT ROS 2/26/2019   Neurology Dizzy spells   Eyes Double vision   Ears, Nose, Throat Hearing loss       The remainder of the 10 point ROS is negative    PHYSICIAL EXAMINATION:  Constitutional: The patient was well-groomed and in no acute distress.   Skin: Warm and pink.  Psychiatric: The patient's affect was calm, cooperative, and appropriate.   Respiratory: Breathing comfortably without stridor or exertion of accessory muscles.  Eyes: Pupils were equal and reactive. Extraocular movement intact.   Head: Normocephalic and atraumatic. No lesions or scars.  Ears: Patient placed under the microscope for microscopic evaluation and cleaning of cerumen which was obscuring full visualization of both TMs. Under high power magnification, the right ear  was examined and cleaned of cerumen using curet, alligator forceps, and suction.  After cleaning, TM is fully visualized and has normal position with normal middle ear aeration. The left ear was then cleaned and inspected using microscope, instruments and similar techniques. After cleaning of cerumen, the TM has normal position with normal aeration to middle ear.  Nose: Sinuses were nontender. Anterior rhinoscopy revealed midline septum and absence of purulence or polyps.  Oral Cavity: Normal tongue, floor of mouth, buccal mucosa, and palate. No lesions or masses on inspection or palpation. No abnormal lymph tissue in the oropharynx.   Neck: The parotid is soft without masses. Supple with normal laryngeal and tracheal landmarks.   Lymphatic: There is no palpable lymphadenopathy or other masses in the neck.   Neurologic: Alert and oriented x 3. Cranial nerves III-XI within normal limits. Voice quality normal.  Cerebellar Function Tests:  Grossly normal    Audiogram: Audiogram shows a high-frequency sensorineural hearing loss in the right ear, slightly worse but maintaining excellent discrimination at 96%.  She has no hearing in the left ear as expected.  Subjectively she feels her right hearing is unchanged.      IMPRESSION AND PLAN:   1. Right high-frequency sensorineural hearing loss: Discussed this with her in detail today.  Discussed options for hearing aid which she will pursue at her discretion in place of location.  She needs to protect her ears from noise exposure which she is rarely around.  Recommend she monitor this closer than she has been, follow-up audiogram in 1 year.  2. Dizziness: Her dizziness which she describes as lightheadedness, I feel likely represents postural hypotension.  We discussed this in detail and she will follow-up with her family physician as recommended in 6 weeks.  She had any worsening or other balance issues arise she will return.  3. Diplopia: Recent episode of double vision  only lasted for seconds and was just one time.  Recommended we just monitor that for now and she will follow-up with her family physician or ophthalmologist if that returns.  Reassured her I do not think this is at all related to her acoustic neuroma removal.  4. Left deafness: Secondary to acoustic neuroma and previous surgery.  Option for a Bi-cros hearing aid discussed if she desires, she will pursue that at her discretion in place of location.    Patient is to follow-up in 1 year to monitor hearing or sooner if any symptoms or problems.  Would recommend consideration for one more follow-up MRI scan in 2-3 years.    Thank you very much for the opportunity to participate in the care of your patient.    Rick L Nissen MD

## 2019-03-22 ENCOUNTER — HEALTH MAINTENANCE LETTER (OUTPATIENT)
Age: 69
End: 2019-03-22

## 2019-04-19 DIAGNOSIS — E78.2 MIXED HYPERLIPIDEMIA: ICD-10-CM

## 2019-04-20 RX ORDER — ATORVASTATIN CALCIUM 10 MG/1
TABLET, FILM COATED ORAL
Qty: 30 TABLET | Refills: 0 | COMMUNITY
Start: 2019-04-20

## 2019-04-20 NOTE — TELEPHONE ENCOUNTER
Received incoming refill request for  Pending Prescriptions:                       Disp   Refills    atorvastatin (LIPITOR) 10 MG tablet [Phar*30 tab*0            Sig: TAKE 1 TABLET(10 MG) BY MOUTH DAILY    Patient last had a refill of this medication on 2/7/19 for a 30 day supply with one refill. Patient is supposed to return for medication check and is now so I called her to inform her of this. There was no answer so I left a message informing her to call back and schedule an appointment.

## 2019-05-09 ENCOUNTER — OFFICE VISIT (OUTPATIENT)
Dept: FAMILY MEDICINE | Facility: CLINIC | Age: 69
End: 2019-05-09

## 2019-05-09 VITALS
HEIGHT: 64 IN | RESPIRATION RATE: 18 BRPM | HEART RATE: 64 BPM | OXYGEN SATURATION: 97 % | SYSTOLIC BLOOD PRESSURE: 150 MMHG | DIASTOLIC BLOOD PRESSURE: 78 MMHG | BODY MASS INDEX: 40.12 KG/M2 | WEIGHT: 235 LBS | TEMPERATURE: 98.6 F

## 2019-05-09 DIAGNOSIS — E66.812 CLASS 2 OBESITY DUE TO EXCESS CALORIES WITHOUT SERIOUS COMORBIDITY IN ADULT, UNSPECIFIED BMI: ICD-10-CM

## 2019-05-09 DIAGNOSIS — E78.2 MIXED HYPERLIPIDEMIA: Primary | ICD-10-CM

## 2019-05-09 DIAGNOSIS — Z79.899 ENCOUNTER FOR LONG-TERM (CURRENT) USE OF MEDICATIONS: ICD-10-CM

## 2019-05-09 DIAGNOSIS — I10 ESSENTIAL HYPERTENSION, BENIGN: ICD-10-CM

## 2019-05-09 DIAGNOSIS — E66.09 CLASS 2 OBESITY DUE TO EXCESS CALORIES WITHOUT SERIOUS COMORBIDITY IN ADULT, UNSPECIFIED BMI: ICD-10-CM

## 2019-05-09 LAB
ALBUMIN SERPL-MCNC: 4.5 G/DL (ref 3.6–5.1)
ALP SERPL-CCNC: 47 U/L (ref 40–115)
ALT 1742-6: 10 U/L (ref 9–46)
AST 1920-8: 14 U/L (ref 10–35)
BILIRUB SERPL-MCNC: 0.7 MG/DL (ref 0.2–1.2)
BUN SERPL-MCNC: 16 MG/DL (ref 7–25)
CALCIUM SERPL-MCNC: 9.7 MG/DL (ref 8.6–10.3)
CHLORIDE SERPLBLD-SCNC: 108.7 MMOL/L (ref 98–110)
CHOLEST SERPL-MCNC: 260 MG/DL (ref 0–199)
CHOLEST/HDLC SERPL: 5 {RATIO} (ref 0–5)
CO2 SERPL-SCNC: 26.2 MMOL/L (ref 20–32)
CREAT SERPL-MCNC: 0.76 MG/DL (ref 0.7–1.18)
GLUCOSE SERPL-MCNC: 87 MG/DL (ref 60–99)
HDLC SERPL-MCNC: 53 MG/DL (ref 40–150)
LDLC SERPL CALC-MCNC: 178 MG/DL (ref 0–99)
POTASSIUM SERPL-SCNC: 4.1 MMOL/L (ref 3.5–5.3)
PROT SERPL-MCNC: 7.4 G/DL (ref 6.1–8.1)
SODIUM SERPL-SCNC: 143.4 MMOL/L (ref 135–146)
TRIGL SERPL-MCNC: 143 MG/DL (ref 0–149)

## 2019-05-09 PROCEDURE — 99214 OFFICE O/P EST MOD 30 MIN: CPT | Performed by: FAMILY MEDICINE

## 2019-05-09 PROCEDURE — 80061 LIPID PANEL: CPT | Performed by: FAMILY MEDICINE

## 2019-05-09 PROCEDURE — 80053 COMPREHEN METABOLIC PANEL: CPT | Performed by: FAMILY MEDICINE

## 2019-05-09 PROCEDURE — 36415 COLL VENOUS BLD VENIPUNCTURE: CPT | Performed by: FAMILY MEDICINE

## 2019-05-09 RX ORDER — LISINOPRIL/HYDROCHLOROTHIAZIDE 10-12.5 MG
1 TABLET ORAL DAILY
Qty: 90 TABLET | Refills: 1 | Status: SHIPPED | OUTPATIENT
Start: 2019-05-09 | End: 2020-02-07

## 2019-05-09 RX ORDER — ATORVASTATIN CALCIUM 10 MG/1
10 TABLET, FILM COATED ORAL DAILY
Qty: 90 TABLET | Refills: 1 | Status: SHIPPED | OUTPATIENT
Start: 2019-05-09 | End: 2020-02-07

## 2019-05-09 ASSESSMENT — PATIENT HEALTH QUESTIONNAIRE - PHQ9: SUM OF ALL RESPONSES TO PHQ QUESTIONS 1-9: 0

## 2019-05-09 ASSESSMENT — MIFFLIN-ST. JEOR: SCORE: 1580.95

## 2019-05-09 NOTE — NURSING NOTE
Mary is here for fasting med check    Pre-visit Screening:  Immunizations:  up to date  Colonoscopy:  is up to date  Mammogram: is up to date  Asthma Action Test/Plan:  NA  PHQ9:  Done today  GAD7:  Done today  Questioned patient about current smoking habits Pt. has never smoked.  Ok to leave detailed message on voice mail for today's visit only Yes, phone # [phone]

## 2019-05-09 NOTE — PATIENT INSTRUCTIONS
1)  Medication: continue current medication regimen unchanged for cholesterol  2)  Low fat, low cholesterol diet  3)  Regular aerobic exercise and weight loss  4)  Recheck in 6 months, sooner should new symptoms or   problems arise.    Patient Education: Reviewed risks of elevated lipids and principles   of treatment.        (I10) Essential hypertension, benign  Comment: dose change adding HCTZ  Plan: Comprehensive Metobolic Panel (BFP), VENOUS         COLLECTION, lisinopril-hydrochlorothiazide         (PRINZIDE/ZESTORETIC) 10-12.5 MG tablet        1)  Medication: continue current lisinopril and add hydrochlorothiazide/ monitor home BP readings  2)  Dietary sodium restriction  3)  Regular aerobic exercise  4)  Recheck in 6 months, sooner should new symptoms or   problems arise.    Patient Education: Reviewed risks of hypertension and principles of   treatment.

## 2019-05-09 NOTE — PROGRESS NOTES
"SUBJECTIVE:  Mary Carrera is an 69 year old female who presents for evaluation of   hyperlipidemia and hypertension. She has been diagnosed in the past as having   combined hyperlipidemia. She indicates that she is feeling well   and denies any symptoms of cardiovascular disease. Specifically   denies chest pain, palpitations, dyspnea, orthopnea, PND,   claudication or peripheral edema. Treatment modalities employed to   this point include diet and Zocor. Current medication   regimen is as listed below. Patient denies any side effects of   medication.    Family history: positive for cardiovascular disease  Age at diagnosis of hyperlipidemia: age 56 started medications this year , hypertension age 67  Cardiovascular risk factors: family history, lipids, hypertension, obesity, sleep apnea and stress    Current Outpatient Medications   Medication     atorvastatin (LIPITOR) 10 MG tablet     ibuprofen (ADVIL/MOTRIN) 800 MG tablet     lisinopril (PRINIVIL/ZESTRIL) 10 MG tablet     multivitamin  with lutein (OCUVITE WITH LTEIN) CAPS     sertraline (ZOLOFT) 100 MG tablet     No current facility-administered medications for this visit.      Allergies   Allergen Reactions     No Known Allergies        Social History     Tobacco Use     Smoking status: Never Smoker     Smokeless tobacco: Never Used   Substance Use Topics     Alcohol use: Yes     Alcohol/week: 2.5 oz     Types: 5 Standard drinks or equivalent per week     Comment: wine       OBJECTIVE:  /78 (BP Location: Right arm, Patient Position: Sitting, Cuff Size: Adult Large)   Pulse 64   Temp 98.6  F (37  C) (Oral)   Resp 18   Ht 1.626 m (5' 4\")   Wt 106.6 kg (235 lb)   LMP 10/15/2003   SpO2 97%   BMI 40.34 kg/m    Repeat BP R arm seated = 150/78  with large size cuff.  Skin: negative  Fundi: deferred  Lungs: negative, Percussion normal. Good diaphragmatic excursion. Lungs clear  Heart: negative, PMI normal. No lifts, heaves, or thrills. RRR. No " murmurs, clicks gallops or rub  Peripheral pulses: radial=4/4, femoral=4/4, popliteal=4/4, dorsalis pedis=4/4,  Abd; The abdomen is soft without tenderness, guarding, mass or organomegaly. Bowel sounds are normal. No CVA tenderness or inguinal adenopathy noted.  BMI : Body mass index is 40.34 kg/m .    ASSESSMENT:  (E78.2) Mixed hyperlipidemia  (primary encounter diagnosis)  Comment: await results on medications  Plan: Lipid Panel (BFP), VENOUS COLLECTION,         atorvastatin (LIPITOR) 10 MG tablet        1)  Medication: continue current medication regimen unchanged  2)  Low fat, low cholesterol diet  3)  Regular aerobic exercise  4)  Recheck in 6 months, sooner should new symptoms or   problems arise.    Patient Education: Reviewed risks of elevated lipids and principles   of treatment.        (I10) Essential hypertension, benign  Comment: dose change adding HCTZ  Plan: Comprehensive Metobolic Panel (BFP), VENOUS         COLLECTION, lisinopril-hydrochlorothiazide         (PRINZIDE/ZESTORETIC) 10-12.5 MG tablet        1)  Medication: continue current lisinopril and add hydrochlorothiazide/ monitor home BP readings  2)  Dietary sodium restriction  3)  Regular aerobic exercise  4)  Recheck in 6 months, sooner should new symptoms or   problems arise.    Patient Education: Reviewed risks of hypertension and principles of   treatment.        (E66.09) Class 2 obesity due to excess calories without serious comorbidity in adult, unspecified BMI  Plan: Comprehensive Metobolic Panel (BFP), Lipid         Panel (BFP), VENOUS COLLECTION        A low fat diet, regular aerobic exercise like walking 30 minutes daily and weight control is the treatment recommendations at this time      (Z79.899) Encounter for long-term (current) use of medications  Plan: Comprehensive Metobolic Panel (BFP), Lipid         Panel (BFP), VENOUS COLLECTION              Patient Education: Reviewed risks of elevated lipids and principles   of treatment.

## 2019-07-31 ENCOUNTER — OFFICE VISIT (OUTPATIENT)
Dept: FAMILY MEDICINE | Facility: CLINIC | Age: 69
End: 2019-07-31

## 2019-07-31 VITALS
BODY MASS INDEX: 39.81 KG/M2 | HEART RATE: 70 BPM | TEMPERATURE: 98.4 F | SYSTOLIC BLOOD PRESSURE: 132 MMHG | HEIGHT: 64 IN | DIASTOLIC BLOOD PRESSURE: 78 MMHG | WEIGHT: 233.2 LBS | OXYGEN SATURATION: 97 %

## 2019-07-31 DIAGNOSIS — L82.0 SEBORRHEIC KERATOSIS, INFLAMED: Primary | ICD-10-CM

## 2019-07-31 PROCEDURE — 17110 DESTRUCTION B9 LES UP TO 14: CPT | Performed by: FAMILY MEDICINE

## 2019-07-31 ASSESSMENT — MIFFLIN-ST. JEOR: SCORE: 1572.79

## 2019-07-31 NOTE — NURSING NOTE
Mary Block is here today for a bump on her back that she feels has gotten bigger.    Pre-visit Screening:  Immunizations:  up to date  Colonoscopy:  is up to date  Mammogram: is up to date  Asthma Action Test/Plan:  NA  PHQ9:  NA  GAD7:  NA  Questioned patient about current smoking habits Pt. has never smoked.  Ok to leave detailed message on voice mail for today's visit only Yes, phone # [phone]

## 2019-07-31 NOTE — PROGRESS NOTES
RASH    Location:Right upper back under her bra strap and left anterior chest wall above the left breast    When:for 10+ years    any causative agent ?:she picks at it and her bra strap rubs on these areas/ often itch    any other chronic skin diseases?: dry skin, solar keratosis    Description:Classic stuck on appearance seborrheic keratosis    1.Right upper back over the scapula and under the bra line irritated 1 cm lesions  2. Left upper chest wall along the sternal border above the left breast 0.5 cm lesion    pt use of Meds:NA    Diagnosis:(L82.0) Seborrheic keratosis, inflamed  (primary encounter diagnosis)  Comment: reviewed cosmetic possible coverage. Benign lesions. Options  Plan: DESTRUCT BENIGN LESION, UP TO 14        Patient after listening to options and risks elected cryotherapy. Signed she will pay for removal if not covered and understands risks/ benefits.     Monitor lesion for blistering and healing new skin. Gentle soap, no sun exposure, moisturizers and monitor for complete healing.  3 second application of liquid nitrogen with repetition three times.  Patient tolerated the procedure well.        plan for follow up: prn

## 2019-07-31 NOTE — PATIENT INSTRUCTIONS
Monitor lesion for blistering and healing new skin. Gentle soap, no sun exposure, moisturizers and monitor for complete healing.  3 second application of liquid nitrogen with repetition three times.  Patient tolerated the procedure well

## 2019-08-23 ENCOUNTER — HOSPITAL ENCOUNTER (OUTPATIENT)
Facility: CLINIC | Age: 69
Discharge: HOME | End: 2019-08-23
Attending: COLON & RECTAL SURGERY | Admitting: COLON & RECTAL SURGERY
Payer: MEDICARE

## 2019-08-23 VITALS
OXYGEN SATURATION: 94 % | SYSTOLIC BLOOD PRESSURE: 113 MMHG | HEART RATE: 73 BPM | BODY MASS INDEX: 38.41 KG/M2 | WEIGHT: 225 LBS | HEIGHT: 64 IN | DIASTOLIC BLOOD PRESSURE: 68 MMHG | RESPIRATION RATE: 34 BRPM

## 2019-08-23 LAB — COLONOSCOPY: NORMAL

## 2019-08-23 PROCEDURE — 88305 TISSUE EXAM BY PATHOLOGIST: CPT | Performed by: COLON & RECTAL SURGERY

## 2019-08-23 PROCEDURE — G0500 MOD SEDAT ENDO SERVICE >5YRS: HCPCS | Performed by: COLON & RECTAL SURGERY

## 2019-08-23 PROCEDURE — 88305 TISSUE EXAM BY PATHOLOGIST: CPT | Mod: 26 | Performed by: COLON & RECTAL SURGERY

## 2019-08-23 PROCEDURE — 45385 COLONOSCOPY W/LESION REMOVAL: CPT | Performed by: COLON & RECTAL SURGERY

## 2019-08-23 PROCEDURE — 25000128 H RX IP 250 OP 636: Performed by: COLON & RECTAL SURGERY

## 2019-08-23 RX ORDER — ONDANSETRON 4 MG/1
4 TABLET, ORALLY DISINTEGRATING ORAL EVERY 6 HOURS PRN
Status: DISCONTINUED | OUTPATIENT
Start: 2019-08-23 | End: 2019-08-23 | Stop reason: HOSPADM

## 2019-08-23 RX ORDER — DIPHENHYDRAMINE HYDROCHLORIDE 50 MG/ML
25 INJECTION INTRAMUSCULAR; INTRAVENOUS EVERY 4 HOURS PRN
Status: DISCONTINUED | OUTPATIENT
Start: 2019-08-23 | End: 2019-08-23 | Stop reason: HOSPADM

## 2019-08-23 RX ORDER — NALOXONE HYDROCHLORIDE 0.4 MG/ML
.1-.4 INJECTION, SOLUTION INTRAMUSCULAR; INTRAVENOUS; SUBCUTANEOUS
Status: DISCONTINUED | OUTPATIENT
Start: 2019-08-23 | End: 2019-08-23 | Stop reason: HOSPADM

## 2019-08-23 RX ORDER — FLUMAZENIL 0.1 MG/ML
0.2 INJECTION, SOLUTION INTRAVENOUS
Status: DISCONTINUED | OUTPATIENT
Start: 2019-08-23 | End: 2019-08-23 | Stop reason: HOSPADM

## 2019-08-23 RX ORDER — PROCHLORPERAZINE MALEATE 5 MG
5 TABLET ORAL EVERY 6 HOURS PRN
Status: DISCONTINUED | OUTPATIENT
Start: 2019-08-23 | End: 2019-08-23 | Stop reason: HOSPADM

## 2019-08-23 RX ORDER — DIPHENHYDRAMINE HCL 25 MG
25 CAPSULE ORAL EVERY 4 HOURS PRN
Status: DISCONTINUED | OUTPATIENT
Start: 2019-08-23 | End: 2019-08-23 | Stop reason: HOSPADM

## 2019-08-23 RX ORDER — ONDANSETRON 2 MG/ML
4 INJECTION INTRAMUSCULAR; INTRAVENOUS EVERY 6 HOURS PRN
Status: DISCONTINUED | OUTPATIENT
Start: 2019-08-23 | End: 2019-08-23 | Stop reason: HOSPADM

## 2019-08-23 RX ORDER — ONDANSETRON 2 MG/ML
4 INJECTION INTRAMUSCULAR; INTRAVENOUS
Status: DISCONTINUED | OUTPATIENT
Start: 2019-08-23 | End: 2019-08-23 | Stop reason: HOSPADM

## 2019-08-23 RX ORDER — LIDOCAINE 40 MG/G
CREAM TOPICAL
Status: DISCONTINUED | OUTPATIENT
Start: 2019-08-23 | End: 2019-08-23 | Stop reason: HOSPADM

## 2019-08-23 RX ORDER — FENTANYL CITRATE 50 UG/ML
INJECTION, SOLUTION INTRAMUSCULAR; INTRAVENOUS PRN
Status: DISCONTINUED | OUTPATIENT
Start: 2019-08-23 | End: 2019-08-23 | Stop reason: HOSPADM

## 2019-08-23 ASSESSMENT — MIFFLIN-ST. JEOR: SCORE: 1535.59

## 2019-08-23 NOTE — H&P
Pre-Endoscopy History and Physical     Mary Carrera MRN# [id number]   YOB: 1950 Age: 69 year old     Date of Procedure: 8/23/2019  Primary care provider: Lynn Lim  Type of Endoscopy: colonoscopy  Reason for Procedure: screening  Type of Anesthesia Anticipated: Moderate Sedation    HPI:    Mary Block is a 69 year old female who will be undergoing the above procedure.      A history and physical has been performed. The patient's medications and allergies have been reviewed. The risks and benefits of the procedure including the risk of bleeding, perforation, and missed lesions as well as the sedation options and risks were discussed with the patient.  All questions were answered and informed consent was obtained.      Allergies   Allergen Reactions     No Known Allergies         Current Facility-Administered Medications   Medication     lidocaine (LMX4) cream     lidocaine 1 % 0.1-1 mL     ondansetron (ZOFRAN) injection 4 mg     sodium chloride (PF) 0.9% PF flush 3 mL     sodium chloride (PF) 0.9% PF flush 3 mL       Medications Prior to Admission   Medication Sig Dispense Refill Last Dose     atorvastatin (LIPITOR) 10 MG tablet Take 1 tablet (10 mg) by mouth daily 90 tablet 1 8/22/2019 at Unknown time     lisinopril-hydrochlorothiazide (PRINZIDE/ZESTORETIC) 10-12.5 MG tablet Take 1 tablet by mouth daily 90 tablet 1 Past Week at Unknown time     sertraline (ZOLOFT) 100 MG tablet Take 1 tablet (100 mg) by mouth daily 90 tablet 3 8/22/2019 at Unknown time     ibuprofen (ADVIL/MOTRIN) 800 MG tablet Take 1 tablet (800 mg) by mouth every 12 hours as needed for moderate pain 60 tablet 1 Taking     multivitamin  with lutein (OCUVITE WITH LTEIN) CAPS Take 1 capsule by mouth daily   Taking       Patient Active Problem List   Diagnosis     Leiomyoma of uterus     Obesity     Benign neoplasm of cranial nerve (H)     Hearing loss     Pure hypercholesterolemia     Depression, major, recurrent, in complete  "remission (H)     Alopecia [facility]     ACP (advance care planning)     Osteoarthritis of right knee     MANNIE (obstructive sleep apnea)     S/P total knee arthroplasty, left     Morbid obesity (H)     Alcohol dependence in remission (H)     Dizziness     Asymmetrical right sensorineural hearing loss     Essential hypertension, benign        Past Medical History:   Diagnosis Date     Arthritis      Chronic depressive personality disorder      Depression, major, recurrent, in complete remission (H) 8/16/2013     Hearing loss      Hypertension      Other and unspecified alcohol dependence, in remission      Sleep apnea     uses CPAP machine        Past Surgical History:   Procedure Laterality Date     ARTHROPLASTY KNEE Right 12/5/2016    Procedure: ARTHROPLASTY KNEE;  Surgeon: Severo Conroy MD;  Location: [facility] OR     ARTHROPLASTY KNEE Left 9/12/2017    Procedure: ARTHROPLASTY KNEE;  Left Total Knee Arthroplasty ;  Surgeon: Severo Conroy MD;  Location: [facility] OR     C LIGATE FALLOPIAN TUBE  1982    Tubal Ligation     C NONSPECIFIC PROCEDURE  6/96    Acoustic Neuroma   left, dr tran/olivia QUIÑONES REVISION OF CRANIAL NERVE  10/2006    recurrent acoustic neuroma     COLONOSCOPY  2007    WNL     COLONOSCOPY  5/13/2014    Procedure: COLONOSCOPY;  Surgeon: Priyanka Galvan MD;  Location:  GI     ENT SURGERY       HC REMOVAL OF LEG VEINS/ULCER  1988 & 1996     ROTATOR CUFF REPAIR RT/LT  10/2015    right       Social History     Tobacco Use     Smoking status: Never Smoker     Smokeless tobacco: Never Used   Substance Use Topics     Alcohol use: Yes     Alcohol/week: 2.5 oz     Types: 5 Standard drinks or equivalent per week     Comment: wine on the weekends       Family History   Problem Relation Age of Onset     Heart Disease Mother      Alcohol/Drug Father      Cancer No family hx of      Diabetes No family hx of          PHYSICAL EXAM:   /79   Resp 16   Ht 1.626 m (5' 4\")   Wt " "102.1 kg (225 lb)   LMP 10/15/2003   SpO2 97%   BMI 38.62 kg/m   Estimated body mass index is 38.62 kg/m  as calculated from the following:    Height as of this encounter: 1.626 m (5' 4\").    Weight as of this encounter: 102.1 kg (225 lb).   Mental status - alert and oriented  RESP: lungs clear  CV: RRR  AIRWAY EXAM: Mallampatti Class II (visualization of the soft palate, fauces, and uvula)    IMPRESSION   ASA Class 2 - Mild systemic disease      Signed Electronically by: Roger Key MD  August 23, 2019    Colorectal Surgery  [phone] (office)  [phone] (pager)  [email or web address]          "

## 2019-08-26 LAB — COPATH REPORT: NORMAL

## 2019-09-27 ENCOUNTER — HEALTH MAINTENANCE LETTER (OUTPATIENT)
Age: 69
End: 2019-09-27

## 2019-09-30 ENCOUNTER — DOCUMENTATION ONLY (OUTPATIENT)
Dept: SLEEP MEDICINE | Facility: CLINIC | Age: 69
End: 2019-09-30

## 2019-09-30 DIAGNOSIS — G47.33 OSA (OBSTRUCTIVE SLEEP APNEA): Primary | ICD-10-CM

## 2019-09-30 NOTE — PROGRESS NOTES
Patient brought her Airsense 10 Auto CPAP machine to [facility] showroom on 9/30/2019 for a troubleshoot appointment.  Patient's machine was found to have a high pitched wheezing noise that has been happening over the past few months.  We were able to duplicate the issue in office.  Called [employer] tech support line and confirmed that patient's machine is no longer under warranty.  Per instruction from [employer], we are to send the patient's machine in for a repair estimate.  Patient was setup with a loaner [employer] Airsense 10 today and we will be sending her faulty machine in for a repair estimate.

## 2019-10-02 ENCOUNTER — TRANSFERRED RECORDS (OUTPATIENT)
Dept: FAMILY MEDICINE | Facility: CLINIC | Age: 69
End: 2019-10-02

## 2019-10-02 LAB — MAMMOGRAM: NORMAL

## 2019-10-08 ENCOUNTER — OFFICE VISIT (OUTPATIENT)
Dept: SLEEP MEDICINE | Facility: CLINIC | Age: 69
End: 2019-10-08
Payer: MEDICARE

## 2019-10-08 VITALS
DIASTOLIC BLOOD PRESSURE: 76 MMHG | SYSTOLIC BLOOD PRESSURE: 123 MMHG | OXYGEN SATURATION: 93 % | HEART RATE: 78 BPM | WEIGHT: 233.4 LBS | RESPIRATION RATE: 18 BRPM | HEIGHT: 65 IN | BODY MASS INDEX: 38.89 KG/M2

## 2019-10-08 DIAGNOSIS — G47.33 OSA (OBSTRUCTIVE SLEEP APNEA): Primary | ICD-10-CM

## 2019-10-08 PROCEDURE — 99213 OFFICE O/P EST LOW 20 MIN: CPT | Performed by: INTERNAL MEDICINE

## 2019-10-08 ASSESSMENT — MIFFLIN-ST. JEOR: SCORE: 1589.58

## 2019-10-08 NOTE — PROGRESS NOTES
Obstructive Sleep Apnea - PAP Follow-Up Visit:    Chief Complaint   Patient presents with     CPAP Follow Up       Mary Carrera comes in today for follow-up of their moderate- severe sleep apnea, managed with CPAP. She was previously followed by Dr. Marsh.     PSG on 1/23/2017 showed an AHI of 29.7 per hour.     Medical history is significant for HTN, depression.     Overall, she rates the experience with PAP as 10 (0 poor, 10 great). The mask is comfortable. The mask is not leaking.  She is not snoring with the mask on. She is not having gasp arousals.  She is not having significant oral/nasal dryness. The pressure settings are comfortable.     She uses nasal pillows.     Bedtime is typically 10:30 pm. Usually it takes about  20 minutes to fall asleep with the mask on. Wake time is typically 6:30 am.  Patient is using PAP therapy 7 hours per night. The patient is usually getting 7 hours of sleep per night.    She does feel rested in the morning.    Hornick Sleepiness Scale: 0/24    ResMed     Auto-PAP 9-18 cmH2O download:  30/30 total days of use. 0 nonuse days. 30/30 days with >4 hours use.  Average use 7 hours 1 minutes per day. Median Leak 2.3 L/min. 95%ile Leak 17.4 L/min. CPAP 95% pressure 13.7cm. AHI 0.8      Reviewed by team:      Reviewed by provider:        Problem List:  Patient Active Problem List    Diagnosis Date Noted     Essential hypertension, benign 05/09/2019     Priority: Medium     Dizziness 02/26/2019     Priority: Medium     Asymmetrical right sensorineural hearing loss 02/26/2019     Priority: Medium     Alcohol dependence in remission (H) 01/11/2019     Priority: Medium     Morbid obesity (H) 11/12/2018     Priority: Medium     S/P total knee arthroplasty, left 09/12/2017     Priority: Medium     MANNIE (obstructive sleep apnea) 01/25/2017     Priority: Medium     Diagnostic Study  Sleep Study 1/23/2017 - (226.0 lbs) AHI 29.7, RDI 36.7, Supine AHI -, REM AHI 91.6, Low O2 75.3%, Time  Spent ?88% 16.9 minutes / Time Spent ?89% 34.8 minutes.       Osteoarthritis of right knee 12/05/2016     Priority: Medium     ACP (advance care planning) 04/08/2014     Priority: Medium       Advance Care Planning 1/20/2016: ACP Review of Chart / Resources Provided:  Reviewed chart for advance care plan.  Mary Carrera has no plan or code status on file. Discussed available resources and provided with information. Confirmed code status reflects current choices pending further ACP discussions.  Confirmed/documented legally designated decision maker(s). Added by Mahnaz Britt  Advance Care Planning 9/7/2017: ACP Review of Chart / Resources Provided:  Reviewed chart for advance care plan.  Mary Carrera has no plan or code status on file however states presence of ACP document. Copy requested.   Added by Joycelyn [facility] 09/06/2013     Priority: Medium     State Tier Level:  Tier 1  Status:  n/a  Care Coordinator:  n/a     See Letters for [facility] Care Plan           Depression, major, recurrent, in complete remission (H) 08/16/2013     Priority: Medium     Alopecia areata 08/16/2013     Priority: Medium     Pure hypercholesterolemia 08/17/2007     Priority: Medium     Benign neoplasm of cranial nerve (H) 09/29/2006     Priority: Medium     Problem list name updated by automated process. Provider to review       Hearing loss 09/29/2006     Priority: Medium     Problem list name updated by automated process. Provider to review       Obesity 01/30/2004     Priority: Medium     Problem list name updated by automated process. Provider to review       Leiomyoma of uterus 01/14/2002     Priority: Medium     Problem list name updated by automated process. Provider to review            LMP 10/15/2003     Impression/Plan:     Moderate to severe sleep apnea.     -  Tolerating PAP well. Daytime symptoms are improved. Regular compliance and normal AHI is demonstrated on download.     Plan:      1. Continue auto PAP therapy     Mary Carrera will follow up in about 1 year(s).     Fifteen minutes spent with patient, all of which were spent face-to-face counseling, consulting, coordinating plan of care.      Edy Garcia MD, MD    CC:  Lynn Lim,

## 2019-10-08 NOTE — PROGRESS NOTES
"Chief Complaint   Patient presents with     CPAP Follow Up       Initial LMP 10/15/2003  Estimated body mass index is 38.62 kg/m  as calculated from the following:    Height as of 8/23/19: 1.626 m (5' 4\").    Weight as of 8/23/19: 102.1 kg (225 lb).    Medication Reconciliation: complete    ESS: 0    Darlin Andrew  [facility] - Specialist    "

## 2019-10-08 NOTE — PATIENT INSTRUCTIONS
Your BMI is Body mass index is 38.84 kg/m .  Weight management is a personal decision.  If you are interested in exploring weight loss strategies, the following discussion covers the approaches that may be successful. Body mass index (BMI) is one way to tell whether you are at a healthy weight, overweight, or obese. It measures your weight in relation to your height.  A BMI of 18.5 to 24.9 is in the healthy range. A person with a BMI of 25 to 29.9 is considered overweight, and someone with a BMI of 30 or greater is considered obese. More than two-thirds of American adults are considered overweight or obese.  Being overweight or obese increases the risk for further weight gain. Excess weight may lead to heart disease and diabetes.  Creating and following plans for healthy eating and physical activity may help you improve your health.  Weight control is part of healthy lifestyle and includes exercise, emotional health, and healthy eating habits. Careful eating habits lifelong are the mainstay of weight control. Though there are significant health benefits from weight loss, long-term weight loss with diet alone may be very difficult to achieve- studies show long-term success with dietary management in less than 10% of people. Attaining a healthy weight may be especially difficult to achieve in those with severe obesity. In some cases, medications, devices and surgical management might be considered.  What can you do?  If you are overweight or obese and are interested in methods for weight loss, you should discuss this with your provider.     Consider reducing daily calorie intake by 500 calories.     Keep a food journal.     Avoiding skipping meals, consider cutting portions instead.    Diet combined with exercise helps maintain muscle while optimizing fat loss. Strength training is particularly important for building and maintaining muscle mass. Exercise helps reduce stress, increase energy, and improves fitness.  Increasing exercise without diet control, however, may not burn enough calories to loose weight.       Start walking three days a week 10-20 minutes at a time    Work towards walking thirty minutes five days a week     Eventually, increase the speed of your walking for 1-2 minutes at time    In addition, we recommend that you review healthy lifestyles and methods for weight loss available through the National Institutes of Health patient information sites:  http://win.niddk.nih.gov/publications/index.htm    And look into health and wellness programs that may be available through your health insurance provider, employer, local community center, or humberto club.    Weight management plan: Patient was referred to their PCP to discuss a diet and exercise plan.

## 2019-11-05 DIAGNOSIS — I10 ESSENTIAL HYPERTENSION, BENIGN: ICD-10-CM

## 2019-11-05 DIAGNOSIS — E78.2 MIXED HYPERLIPIDEMIA: ICD-10-CM

## 2019-11-05 RX ORDER — ATORVASTATIN CALCIUM 10 MG/1
TABLET, FILM COATED ORAL
Qty: 90 TABLET | Refills: 0 | OUTPATIENT
Start: 2019-11-05

## 2019-11-05 RX ORDER — LISINOPRIL/HYDROCHLOROTHIAZIDE 10-12.5 MG
1 TABLET ORAL DAILY
Qty: 90 TABLET | Refills: 0 | OUTPATIENT
Start: 2019-11-05

## 2019-11-05 NOTE — TELEPHONE ENCOUNTER
Refused Prescriptions:                       Disp   Refills    atorvastatin (LIPITOR) 10 MG tablet [Pharm*90 tab*0        Sig: TAKE 1 TABLET(10 MG) BY MOUTH DAILY  Refused By: BRITT ADAMES  Reason for Refusal: Appt required, please call patient    lisinopril-hydrochlorothiazide (PRINZIDE/Z*90 tab*0        Sig: TAKE 1 TABLET BY MOUTH DAILY  Refused By: BRITT ADAMES  Reason for Refusal: Appt required, please call patient    Per notes on 5-9-2019 rtc in 6 months fasting   No future appts  Britt  [phone] (home)

## 2019-11-06 RX ORDER — ATORVASTATIN CALCIUM 10 MG/1
TABLET, FILM COATED ORAL
Qty: 90 TABLET | Refills: 0 | OUTPATIENT
Start: 2019-11-06

## 2019-11-06 NOTE — TELEPHONE ENCOUNTER
Refused Prescriptions:                       Disp   Refills    atorvastatin (LIPITOR) 10 MG tablet [Pharm*90 tab*0        Sig: TAKE 1 TABLET(10 MG) BY MOUTH DAILY  Refused By: BRITT ADAMES    Per notes on 5-9-2019 rtc in 6 months fasting   No future appts  Britt  [phone] (home)

## 2020-02-07 ENCOUNTER — OFFICE VISIT (OUTPATIENT)
Dept: FAMILY MEDICINE | Facility: CLINIC | Age: 70
End: 2020-02-07

## 2020-02-07 VITALS
BODY MASS INDEX: 39.91 KG/M2 | HEART RATE: 68 BPM | TEMPERATURE: 97.9 F | RESPIRATION RATE: 18 BRPM | SYSTOLIC BLOOD PRESSURE: 134 MMHG | HEIGHT: 64 IN | WEIGHT: 233.8 LBS | OXYGEN SATURATION: 98 % | DIASTOLIC BLOOD PRESSURE: 88 MMHG

## 2020-02-07 DIAGNOSIS — I10 ESSENTIAL HYPERTENSION, BENIGN: Primary | ICD-10-CM

## 2020-02-07 DIAGNOSIS — F33.42 DEPRESSION, MAJOR, RECURRENT, IN COMPLETE REMISSION (H): ICD-10-CM

## 2020-02-07 DIAGNOSIS — D33.3 LEFT ACOUSTIC NEUROMA (H): ICD-10-CM

## 2020-02-07 DIAGNOSIS — Z79.899 ENCOUNTER FOR LONG-TERM (CURRENT) USE OF MEDICATIONS: ICD-10-CM

## 2020-02-07 DIAGNOSIS — E66.01 MORBID OBESITY (H): ICD-10-CM

## 2020-02-07 DIAGNOSIS — E78.2 MIXED HYPERLIPIDEMIA: ICD-10-CM

## 2020-02-07 DIAGNOSIS — F10.21 ALCOHOL DEPENDENCE IN REMISSION (H): ICD-10-CM

## 2020-02-07 LAB
ALBUMIN SERPL-MCNC: 4.3 G/DL (ref 3.6–5.1)
ALBUMIN/GLOB SERPL: 1.7 {RATIO} (ref 1–2.5)
ALP SERPL-CCNC: 40 U/L (ref 33–130)
ALT 1742-6: 7 U/L (ref 0–32)
AST 1920-8: 13 U/L (ref 0–35)
BILIRUB SERPL-MCNC: 0.8 MG/DL (ref 0.2–1.2)
BUN SERPL-MCNC: 14 MG/DL (ref 7–25)
BUN/CREATININE RATIO: 17.7 (ref 6–22)
CALCIUM SERPL-MCNC: 9 MG/DL (ref 8.6–10.3)
CHLORIDE SERPLBLD-SCNC: 107 MMOL/L (ref 98–110)
CHOLEST SERPL-MCNC: 228 MG/DL (ref 0–199)
CHOLEST/HDLC SERPL: 4 {RATIO} (ref 0–5)
CO2 SERPL-SCNC: 27.4 MMOL/L (ref 20–32)
CREAT SERPL-MCNC: 0.79 MG/DL (ref 0.7–1.18)
GLOBULIN, CALCULATED - QUEST: 2.5 (ref 1.9–3.7)
GLUCOSE SERPL-MCNC: 87 MG/DL (ref 60–99)
HDLC SERPL-MCNC: 55 MG/DL (ref 40–150)
LDLC SERPL CALC-MCNC: 146 MG/DL (ref 0–130)
POTASSIUM SERPL-SCNC: 3.93 MMOL/L (ref 3.5–5.3)
PROT SERPL-MCNC: 6.8 G/DL (ref 6.1–8.1)
SODIUM SERPL-SCNC: 142.7 MMOL/L (ref 135–146)
TRIGL SERPL-MCNC: 135 MG/DL (ref 0–149)

## 2020-02-07 PROCEDURE — 36415 COLL VENOUS BLD VENIPUNCTURE: CPT | Performed by: FAMILY MEDICINE

## 2020-02-07 PROCEDURE — 80053 COMPREHEN METABOLIC PANEL: CPT | Performed by: FAMILY MEDICINE

## 2020-02-07 PROCEDURE — 99214 OFFICE O/P EST MOD 30 MIN: CPT | Performed by: FAMILY MEDICINE

## 2020-02-07 PROCEDURE — 80061 LIPID PANEL: CPT | Performed by: FAMILY MEDICINE

## 2020-02-07 RX ORDER — LISINOPRIL/HYDROCHLOROTHIAZIDE 10-12.5 MG
1 TABLET ORAL DAILY
Qty: 90 TABLET | Refills: 0 | Status: SHIPPED | OUTPATIENT
Start: 2020-02-07 | End: 2020-06-17

## 2020-02-07 RX ORDER — SERTRALINE HYDROCHLORIDE 100 MG/1
100 TABLET, FILM COATED ORAL DAILY
Qty: 90 TABLET | Refills: 3 | Status: SHIPPED | OUTPATIENT
Start: 2020-02-07 | End: 2021-01-06

## 2020-02-07 RX ORDER — ATORVASTATIN CALCIUM 10 MG/1
10 TABLET, FILM COATED ORAL DAILY
Qty: 90 TABLET | Refills: 0 | Status: SHIPPED | OUTPATIENT
Start: 2020-02-07 | End: 2020-06-17 | Stop reason: DRUGHIGH

## 2020-02-07 ASSESSMENT — PATIENT HEALTH QUESTIONNAIRE - PHQ9: SUM OF ALL RESPONSES TO PHQ QUESTIONS 1-9: 0

## 2020-02-07 ASSESSMENT — MIFFLIN-ST. JEOR: SCORE: 1575.51

## 2020-02-07 NOTE — PROGRESS NOTES
"Two issues:    1. Hypertension/ elevated cholesterol    2. Depression    1. SUBJECTIVE:  Mary Carrera is an 69 year old female who presents for evaluation of   Hyperlipidemia and hypertension. She has been diagnosed in the past as having   elevated cholesterol only. She indicates that she is feeling well   and denies any symptoms of cardiovascular disease. Specifically   denies chest pain, palpitations, dyspnea, orthopnea, PND,   claudication or peripheral edema. Treatment modalities employed to   this point include diet, regular aerobic exercise and Zocor. Current medication   regimen is as listed below. Patient denies any side effects of   medication.    Family history: positive for hypertension and cardiovascular disease  Age at diagnosis of hyperlipidemia: 66 and hypertension age 56  Cardiovascular risk factors: previous smoker, family history, lipids, hypertension, obesity, stress and sleep apnea    Current Outpatient Medications   Medication     atorvastatin (LIPITOR) 10 MG tablet     diphenhydrAMINE HCl (BENADRYL ALLERGY PO)     ibuprofen (ADVIL/MOTRIN) 800 MG tablet     lisinopril-hydrochlorothiazide (PRINZIDE/ZESTORETIC) 10-12.5 MG tablet     multivitamin  with lutein (OCUVITE WITH LTEIN) CAPS     sertraline (ZOLOFT) 100 MG tablet     No current facility-administered medications for this visit.      Allergies   Allergen Reactions     No Known Allergies        Social History     Tobacco Use     Smoking status: Never Smoker     Smokeless tobacco: Never Used   Substance Use Topics     Alcohol use: Yes     Alcohol/week: 4.2 standard drinks     Types: 5 Standard drinks or equivalent per week     Comment: wine on the weekends       OBJECTIVE:  /88 (BP Location: Right arm, Patient Position: Sitting, Cuff Size: Adult Large)   Pulse 68   Temp 97.9  F (36.6  C) (Oral)   Ht 1.626 m (5' 4\")   Wt 106.1 kg (233 lb 12.8 oz)   LMP 10/15/2003   SpO2 98%   BMI 40.13 kg/m    Repeat BP R arm seated = 134/88  " with large size cuff.  Skin: seborrheic keratosis middle back  Fundi: deferred  Lungs: negative, Percussion normal. Good diaphragmatic excursion. Lungs clear  Heart: negative, PMI normal. No lifts, heaves, or thrills. RRR. No murmurs, clicks gallops or rub  Peripheral pulses: radial=4/4, femoral=4/4, popliteal=4/4, dorsalis pedis=4/4,  Abd; The abdomen is soft without tenderness, guarding, mass or organomegaly. Bowel sounds are normal. No CVA tenderness or inguinal adenopathy noted.  BMI : Body mass index is 40.13 kg/m .    ASSESSMENT:(I10) Essential hypertension, benign  (primary encounter diagnosis  Plan: Comprehensive Metobolic Panel (BFP), VENOUS         COLLECTION        1)  Medication: continue current medication regimen unchanged  2)  Dietary sodium restriction  3)  Regular aerobic exercise  4)  Recheck in 3 months, sooner should new symptoms or   problems arise.    Patient Education: Reviewed risks of hypertension and principles of   treatment.        (E78.2) Mixed hyperlipidemia  Plan: Lipid Panel (BFP), VENOUS COLLECTION        1)  Medication: continue current medication regimen unchanged  2)  Low fat, low cholesterol diet  3)  Regular aerobic exercise  4)  Recheck in 3 months, sooner should new symptoms or   problems arise.    Patient Education: Reviewed risks of elevated lipids and principles   of treatment.        (F33.42) Depression, major, recurrent, in complete remission (H)  Plan: see below/ I've explained to her that drugs of the SSRI class can have side effects such as weight gain, sexual dysfunction, insomnia, headache, nausea. These medications are generally effective at alleviating symptoms of anxiety and/or depression. Let me know if significant side effects do occur.  Refilled one year    (F10.21) Alcohol dependence in remission (H)  Plan: Comprehensive Metobolic Panel (BFP), VENOUS         COLLECTION            (D33.3) Left acoustic neuroma (H)  Plan: I have reviewed the patient's medical  history in detail and updated the computerized patient record.      (E66.01) Morbid obesity (H)  Plan: Lipid Panel (BFP), Comprehensive Metobolic         Panel (BFP), VENOUS COLLECTION        A low fat diet, regular aerobic exercise like walking 30 minutes daily and weight control is the treatment recommendations at this time      (Z79.899) Encounter for long-term (current) use of medications  Plan: Lipid Panel (BFP), Comprehensive Metobolic         Panel (BFP), VENOUS COLLECTION                    2.   SUBJECTIVE:  Mary Carrera is an 69 year old female who presents for follow-up   of depressive symptoms.  Initially evaluated in her 40's.  Notes from that visit reviewed.  Current symptoms include   fatigue. Symptoms that have subjectively improved include depressed mood, hopelessness, diminished interest or pleasure in activities, weight gain, psychomotor retardation (slowness of thought and reaction), feelings of worthlessness, difficulty with concentration, anxiety, irritablility.  Previous and current treatment modalities   employed include individual therapy and medication(s) Zoloft (sertraline).     Organic causes of depression present: menopause/ sleep apnea    Current Outpatient Medications   Medication     atorvastatin (LIPITOR) 10 MG tablet     diphenhydrAMINE HCl (BENADRYL ALLERGY PO)     ibuprofen (ADVIL/MOTRIN) 800 MG tablet     lisinopril-hydrochlorothiazide (PRINZIDE/ZESTORETIC) 10-12.5 MG tablet     multivitamin  with lutein (OCUVITE WITH LTEIN) CAPS     sertraline (ZOLOFT) 100 MG tablet     No current facility-administered medications for this visit.      Allergies   Allergen Reactions     No Known Allergies      Side effects of medication: none    Social History     Tobacco Use     Smoking status: Never Smoker     Smokeless tobacco: Never Used   Substance Use Topics     Alcohol use: Yes     Alcohol/week: 4.2 standard drinks     Types: 5 Standard drinks or equivalent per week     Comment: wine on  "the weekends         Neurologic: acoustic neuroma followed by MRI neurosurgical  Psychiatric: excessive stress-lives with family  Endocrine: negative    OBJECTIVE:  /88 (BP Location: Right arm, Patient Position: Sitting, Cuff Size: Adult Large)   Pulse 68   Temp 97.9  F (36.6  C) (Oral)   Ht 1.626 m (5' 4\")   Wt 106.1 kg (233 lb 12.8 oz)   LMP 10/15/2003   SpO2 98%   BMI 40.13 kg/m    Mental Status Examination  Posture and motor behavior: negative  Dress, grooming, personal hygiene: negative  Facial expression: negative  Speech: negative  Mood: negative  Coherency and relevance of thought: negative  Thought content: negative  Perceptions: negative  Orientation: negative  Attention and concentration: negative  Memory: : negative  Information: negative  Vocabulary: negative  Abstract reasoning: negative  Judgment: negative    General appearance: healthy, alert, no distress, cooperative, smiling and over weight  Eyes: conjunctivae/corneas clear. PERRL, EOM's intact. Fundi benign  Ears: negative  Oropharynx: Lips, mucosa, and tongue normal. Teeth and gums normal.  Neck: Neck supple. No adenopathy. Thyroid symmetric, normal size,, Carotids without bruits.  Lungs: negative, Percussion normal. Good diaphragmatic excursion. Lungs clear  Heart: negative, PMI normal. No lifts, heaves, or thrills. RRR. No murmurs, clicks gallops or rub  Abdomen: Abdomen soft, non-tender. BS normal. No masses, organomegaly, positive findings: obese  Neuro: Gait normal. Reflexes normal and symmetric. Sensation grossly WNL.      "

## 2020-02-07 NOTE — NURSING NOTE
Mary Block is here today for a med recheck and to check small bumps on her back.    Pre-visit Screening:  Immunizations:  up to date  Colonoscopy:  is up to date  Mammogram: is up to date  Asthma Action Test/Plan:  NA  PHQ9:  NA  GAD7:  NA  Questioned patient about current smoking habits Pt. has never smoked.  Ok to leave detailed message on voice mail for today's visit only Yes, phone # [phone]

## 2020-02-07 NOTE — LETTER
February 8, 2020      Vane Debi  [address]  [place] [address]        Dear ,    We are writing to inform you of your test results.    Improved total and LDL(bad) cholesterol values on atorvastatin. See next page.    Our goal is to lower your LDL cholesterol below 120.     I think we should increase your dose to 20 mgm of atorvastatin. Please call so we can make this change with your pharmacy and with any concerns.        If you have any questions or concerns, please call the clinic at the number listed above.       Sincerely,        Lynn Lim MD

## 2020-02-07 NOTE — PATIENT INSTRUCTIONS
1)  Medication: continue current medication regimen unchanged  2)  Dietary sodium restriction and low fat  3)  Regular aerobic exercise and weight loss  4)  Recheck in 3 months, sooner should new symptoms or   problems arise.    Patient Education: Reviewed risks of hypertension,elevated lipdis and principles of   Treatment.    I've explained to her that drugs of the SSRI class can have side effects such as weight gain, sexual dysfunction, insomnia, headache, nausea. These medications are generally effective at alleviating symptoms of anxiety and/or depression. Let me know if significant side effects do occur.  Refilled for one year    Check coverage for cryotherapy of a seborrheic keratosis on the back

## 2020-03-24 ENCOUNTER — TELEPHONE (OUTPATIENT)
Dept: FAMILY MEDICINE | Facility: CLINIC | Age: 70
End: 2020-03-24

## 2020-03-24 DIAGNOSIS — E78.2 MIXED HYPERLIPIDEMIA: Primary | ICD-10-CM

## 2020-03-24 RX ORDER — ATORVASTATIN CALCIUM 20 MG/1
20 TABLET, FILM COATED ORAL DAILY
Qty: 90 TABLET | Refills: 0 | Status: SHIPPED | OUTPATIENT
Start: 2020-03-24 | End: 2020-06-17

## 2020-03-24 NOTE — TELEPHONE ENCOUNTER
Patient called into the CS line stating that she does want to start taking the 20 mg atorvastatin dosing that Dr. Lim recommended due to her last results. Routing to Dr. Lim to send in a prescription for the 20 mg because patient is almost out of the 10 mg.

## 2020-05-09 DIAGNOSIS — E78.2 MIXED HYPERLIPIDEMIA: ICD-10-CM

## 2020-05-09 DIAGNOSIS — I10 ESSENTIAL HYPERTENSION, BENIGN: ICD-10-CM

## 2020-05-11 RX ORDER — ATORVASTATIN CALCIUM 10 MG/1
TABLET, FILM COATED ORAL
Qty: 90 TABLET | Refills: 0 | COMMUNITY
Start: 2020-05-11

## 2020-05-11 RX ORDER — LISINOPRIL/HYDROCHLOROTHIAZIDE 10-12.5 MG
1 TABLET ORAL DAILY
Qty: 90 TABLET | Refills: 0 | COMMUNITY
Start: 2020-05-11

## 2020-05-11 NOTE — TELEPHONE ENCOUNTER
Mary Carrera is requesting a refill of:    Refused Prescriptions:                       Disp   Refills    atorvastatin (LIPITOR) 10 MG tablet [Pharm*90 tab*0        Sig: TAKE 1 TABLET(10 MG) BY MOUTH DAILY  Refused By: DUNCAN GARCIA  Reason for Refusal: Adjustment in Therapy  Reason for Refusal Comment: Pt now taking 20MG    lisinopril-hydrochlorothiazide (ZESTORETIC*90 tab*0        Sig: TAKE 1 TABLET BY MOUTH DAILY  Refused By: DUNCAN GARCIA  Reason for Refusal: Patient needs appointment

## 2020-06-01 NOTE — TELEPHONE ENCOUNTER
I left a message with this pt to call back and schedule an appt to be see with Dr. Nissen once he is back.    SUNSHINE Ferrer LPN     Interpolation Flap Text: A decision was made to reconstruct the defect utilizing an interpolation axial flap and a staged reconstruction.  A telfa template was made of the defect.  This telfa template was then used to outline the interpolation flap.  The donor area for the pedicle flap was then injected with anesthesia.  The flap was excised through the skin and subcutaneous tissue down to the layer of the underlying musculature.  The interpolation flap was carefully excised within this deep plane to maintain its blood supply.  The edges of the donor site were undermined.   The donor site was closed in a primary fashion.  The pedicle was then rotated into position and sutured.  Once the tube was sutured into place, adequate blood supply was confirmed with blanching and refill.  The pedicle was then wrapped with xeroform gauze and dressed appropriately with a telfa and gauze bandage to ensure continued blood supply and protect the attached pedicle.

## 2020-06-17 ENCOUNTER — OFFICE VISIT (OUTPATIENT)
Dept: FAMILY MEDICINE | Facility: CLINIC | Age: 70
End: 2020-06-17

## 2020-06-17 VITALS
HEART RATE: 64 BPM | TEMPERATURE: 98.5 F | WEIGHT: 230.6 LBS | RESPIRATION RATE: 16 BRPM | BODY MASS INDEX: 39.37 KG/M2 | OXYGEN SATURATION: 97 % | DIASTOLIC BLOOD PRESSURE: 78 MMHG | HEIGHT: 64 IN | SYSTOLIC BLOOD PRESSURE: 138 MMHG

## 2020-06-17 DIAGNOSIS — Z79.899 ENCOUNTER FOR LONG-TERM (CURRENT) USE OF MEDICATIONS: ICD-10-CM

## 2020-06-17 DIAGNOSIS — E78.2 MIXED HYPERLIPIDEMIA: Primary | ICD-10-CM

## 2020-06-17 DIAGNOSIS — I10 ESSENTIAL HYPERTENSION, BENIGN: ICD-10-CM

## 2020-06-17 LAB
ALBUMIN SERPL-MCNC: 4.5 G/DL (ref 3.6–5.1)
ALBUMIN/GLOB SERPL: 1.6 {RATIO} (ref 1–2.5)
ALP SERPL-CCNC: 45 U/L (ref 33–130)
ALT 1742-6: 17 U/L (ref 0–32)
AST 1920-8: 24 U/L (ref 0–35)
BILIRUB SERPL-MCNC: 0.8 MG/DL (ref 0.2–1.2)
BUN SERPL-MCNC: 18 MG/DL (ref 7–25)
BUN/CREATININE RATIO: 21.7 (ref 6–22)
CALCIUM SERPL-MCNC: 9.3 MG/DL (ref 8.6–10.3)
CHLORIDE SERPLBLD-SCNC: 108.8 MMOL/L (ref 98–110)
CHOLEST SERPL-MCNC: 143 MG/DL (ref 0–199)
CHOLEST/HDLC SERPL: 3 {RATIO} (ref 0–5)
CO2 SERPL-SCNC: 25.4 MMOL/L (ref 20–32)
CREAT SERPL-MCNC: 0.83 MG/DL (ref 0.7–1.18)
GLOBULIN, CALCULATED - QUEST: 2.8 (ref 1.9–3.7)
GLUCOSE SERPL-MCNC: 97 MG/DL (ref 60–99)
HDLC SERPL-MCNC: 48 MG/DL (ref 40–150)
LDLC SERPL CALC-MCNC: 76 MG/DL (ref 0–130)
POTASSIUM SERPL-SCNC: 4.27 MMOL/L (ref 3.5–5.3)
PROT SERPL-MCNC: 7.3 G/DL (ref 6.1–8.1)
SODIUM SERPL-SCNC: 143.5 MMOL/L (ref 135–146)
TRIGL SERPL-MCNC: 93 MG/DL (ref 0–149)

## 2020-06-17 PROCEDURE — 80053 COMPREHEN METABOLIC PANEL: CPT | Performed by: FAMILY MEDICINE

## 2020-06-17 PROCEDURE — 99214 OFFICE O/P EST MOD 30 MIN: CPT | Performed by: FAMILY MEDICINE

## 2020-06-17 PROCEDURE — 36415 COLL VENOUS BLD VENIPUNCTURE: CPT | Performed by: FAMILY MEDICINE

## 2020-06-17 PROCEDURE — 80061 LIPID PANEL: CPT | Performed by: FAMILY MEDICINE

## 2020-06-17 RX ORDER — LISINOPRIL/HYDROCHLOROTHIAZIDE 10-12.5 MG
1 TABLET ORAL DAILY
Qty: 90 TABLET | Refills: 1 | Status: SHIPPED | OUTPATIENT
Start: 2020-06-17 | End: 2020-12-21

## 2020-06-17 RX ORDER — ATORVASTATIN CALCIUM 20 MG/1
20 TABLET, FILM COATED ORAL DAILY
Qty: 90 TABLET | Refills: 1 | Status: SHIPPED | OUTPATIENT
Start: 2020-06-17 | End: 2020-12-21

## 2020-06-17 ASSESSMENT — ANXIETY QUESTIONNAIRES
2. NOT BEING ABLE TO STOP OR CONTROL WORRYING: NOT AT ALL
GAD7 TOTAL SCORE: 0
1. FEELING NERVOUS, ANXIOUS, OR ON EDGE: NOT AT ALL
5. BEING SO RESTLESS THAT IT IS HARD TO SIT STILL: NOT AT ALL
3. WORRYING TOO MUCH ABOUT DIFFERENT THINGS: NOT AT ALL
6. BECOMING EASILY ANNOYED OR IRRITABLE: NOT AT ALL
IF YOU CHECKED OFF ANY PROBLEMS ON THIS QUESTIONNAIRE, HOW DIFFICULT HAVE THESE PROBLEMS MADE IT FOR YOU TO DO YOUR WORK, TAKE CARE OF THINGS AT HOME, OR GET ALONG WITH OTHER PEOPLE: NOT DIFFICULT AT ALL
7. FEELING AFRAID AS IF SOMETHING AWFUL MIGHT HAPPEN: NOT AT ALL

## 2020-06-17 ASSESSMENT — PATIENT HEALTH QUESTIONNAIRE - PHQ9
SUM OF ALL RESPONSES TO PHQ QUESTIONS 1-9: 0
5. POOR APPETITE OR OVEREATING: NOT AT ALL

## 2020-06-17 ASSESSMENT — MIFFLIN-ST. JEOR: SCORE: 1555.99

## 2020-06-17 NOTE — PATIENT INSTRUCTIONS
1)  Medication: continue current medication regimen unchanged  2)  Low fat, low cholesterol diet  3)  Regular aerobic exercise  4)  Recheck in 6 months, sooner should new symptoms or   problems arise.    Patient Education: Reviewed risks of elevated lipids and principles   of treatment.

## 2020-06-17 NOTE — NURSING NOTE
Mary is here today for a fasting med recheck.    Pre-visit Screening:  Immunizations:  up to date  Colonoscopy:  is up to date  Mammogram: is up to date  Asthma Action Test/Plan:  NA  PHQ9:  Done today  GAD7:  Done today  Questioned patient about current smoking habits Pt. has never smoked.  Ok to leave detailed message on voice mail for today's visit only Yes, phone # [phone]

## 2020-06-17 NOTE — PROGRESS NOTES
"SUBJECTIVE:  Mary Carrera is an 70 year old female who presents for evaluation of   Hyperlipidemia and hypertension. She has been diagnosed in the past as having   combined hyperlipidemia. She indicates that she is feeling well   and denies any symptoms of cardiovascular disease. Specifically   denies chest pain, palpitations, dyspnea, orthopnea, PND,   claudication or peripheral edema. Treatment modalities employed to   this point include diet, regular aerobic exercise and Lipitor. Current medication   regimen is as listed below. Patient denies any side effects of   medication.    Family history: positive for cardiovascular disease  Age at diagnosis of hyperlipidemia: 56 and hypertension age 67  Cardiovascular risk factors: family history, lipids, hypertension, obesity and sleep apnea    Current Outpatient Medications   Medication     atorvastatin (LIPITOR) 20 MG tablet     diphenhydrAMINE HCl (BENADRYL ALLERGY PO)     ibuprofen (ADVIL/MOTRIN) 800 MG tablet     lisinopril-hydrochlorothiazide (PRINZIDE/ZESTORETIC) 10-12.5 MG tablet     multivitamin  with lutein (OCUVITE WITH LTEIN) CAPS     sertraline (ZOLOFT) 100 MG tablet     No current facility-administered medications for this visit.      Allergies   Allergen Reactions     No Known Allergies        Social History     Tobacco Use     Smoking status: Never Smoker     Smokeless tobacco: Never Used   Substance Use Topics     Alcohol use: Yes     Alcohol/week: 4.2 standard drinks     Types: 5 Standard drinks or equivalent per week     Comment: wine on the weekends       OBJECTIVE:  /78 (BP Location: Left arm, Patient Position: Sitting, Cuff Size: Adult Large)   Pulse 64   Temp 98.5  F (36.9  C) (Oral)   Resp 16   Ht 1.626 m (5' 4\")   Wt 104.6 kg (230 lb 9.6 oz)   LMP 10/15/2003   SpO2 97%   BMI 39.58 kg/m    Repeat BP R arm seated = 138/78  with large size cuff.  Skin: negative  Fundi: deferred  Lungs: negative, Percussion normal. Good diaphragmatic " excursion. Lungs clear  Heart: negative, PMI normal. No lifts, heaves, or thrills. RRR. No murmurs, clicks gallops or rub  Peripheral pulses: radial=4/4, femoral=4/4, popliteal=4/4, dorsalis pedis=4/4,  Abd; The abdomen is soft without tenderness, guarding, mass or organomegaly. Bowel sounds are normal. No CVA tenderness or inguinal adenopathy noted.  EXT: The lower extremities are normal and reveal no sign of DVT. Calves and thighs are soft and non tender, color is normal, no swelling or redness. Ike's sign is negative.  Pedal pulses are normal.  BMI : Body mass index is 39.58 kg/m .    ASSESSMENT:  (E78.2) Mixed hyperlipidemia  (primary encounter diagnosis)  Plan: Lipid Panel (BFP), VENOUS COLLECTION,         atorvastatin (LIPITOR) 20 MG tablet        1)  Medication: continue current medication regimen unchanged  2)  Low fat, low cholesterol diet  3)  Regular aerobic exercise  4)  Recheck in 6 months, sooner should new symptoms or   problems arise.    Patient Education: Reviewed risks of elevated lipids and principles   of treatment.        (I10) Essential hypertension, benign  Plan: Comprehensive Metobolic Panel (BFP), VENOUS         COLLECTION, lisinopril-hydrochlorothiazide         (ZESTORETIC) 10-12.5 MG tablet        1)  Medication: continue current medication regimen unchanged  2)  Dietary sodium restriction  3)  Regular aerobic exercise  4)  Recheck in 6 months, sooner should new symptoms or   problems arise.    Patient Education: Reviewed risks of hypertension and principles of   treatment.        (Z79.899) Encounter for long-term (current) use of medications  Plan: Lipid Panel (BFP), Comprehensive Metobolic         Panel (BFP), VENOUS COLLECTION

## 2020-06-18 ASSESSMENT — ANXIETY QUESTIONNAIRES: GAD7 TOTAL SCORE: 0

## 2020-08-06 ENCOUNTER — OFFICE VISIT (OUTPATIENT)
Dept: VASCULAR SURGERY | Facility: CLINIC | Age: 70
End: 2020-08-06
Payer: MEDICARE

## 2020-08-06 DIAGNOSIS — I83.812 VARICOSE VEINS OF LEFT LOWER EXTREMITY WITH PAIN: Primary | ICD-10-CM

## 2020-08-06 PROCEDURE — 99203 OFFICE O/P NEW LOW 30 MIN: CPT | Performed by: SURGERY

## 2020-08-06 RX ORDER — ASPIRIN 81 MG/1
81 TABLET, CHEWABLE ORAL DAILY
COMMUNITY
End: 2023-08-21

## 2020-08-06 NOTE — LETTER
8/6/2020         RE: Mary Carrera  [address]  [place] [address]        Dear Colleague,    Thank you for referring your patient, Mary Carrera, to the [facility]. Please see a copy of my visit note below.    I had the pleasure of seeing Mrs. Mary Carrera in the [facility] today.  This is a consultation for evaluation of left lower extremity varicose veins.  Patient tells me that she has had varicose veins in the left lower extremity for quite some time spanning over decades.  She has had previous treatment in the last of which was a phlebectomy.  She recalls that this may also have involved ablation of the great saphenous vein.  4 to 5 years after that she started having recurrence of varicose veins in the left lower extremity.  Presently she describes varicose veins in the left leg as well as in the thigh.  He reports that she feels heaviness and achiness from these if she has been standing.  Recently she has not worn any compression stockings.  She does not recall any episodes of phlebitis or deep venous thrombosis.    In review of her past medical history it is notable for obstructive sleep apnea, obesity, hypercholesterolemia, hypertension and history of alcohol dependence.    On examination: She appears comfortable and she is in no acute distress.  Large ropey varicosities are noted in the left lower leg.  Similar large varicosities are noted in the left thigh as well.  No evidence of open wounds.  Bilateral dorsalis pedis pulses are 2+ palpable.    Diagnosis: Symptomatic left lower extremity varicose veins, recurrent.    Plan: Explained the pathophysiology of disease to her in detail.  My recommendation would be for her to start wearing compression stockings.  She tells me that the symptoms of the thigh varicose veins are minimal.  Therefore I think it would be better for compliance and comfort that she wear a 20 to 30 mm knee-high stockings.  I  will see her back in 3 months for a follow-up visit left lower extremity venous ultrasound for reflux.            Again, thank you for allowing me to participate in the care of your patient.        Sincerely,        Aaron Desai MD

## 2020-08-06 NOTE — PROGRESS NOTES
I had the pleasure of seeing Mrs. Mary Carrera in the [facility] today.  This is a consultation for evaluation of left lower extremity varicose veins.  Patient tells me that she has had varicose veins in the left lower extremity for quite some time spanning over decades.  She has had previous treatment in the last of which was a phlebectomy.  She recalls that this may also have involved ablation of the great saphenous vein.  4 to 5 years after that she started having recurrence of varicose veins in the left lower extremity.  Presently she describes varicose veins in the left leg as well as in the thigh.  He reports that she feels heaviness and achiness from these if she has been standing.  Recently she has not worn any compression stockings.  She does not recall any episodes of phlebitis or deep venous thrombosis.    In review of her past medical history it is notable for obstructive sleep apnea, obesity, hypercholesterolemia, hypertension and history of alcohol dependence.    On examination: She appears comfortable and she is in no acute distress.  Large ropey varicosities are noted in the left lower leg.  Similar large varicosities are noted in the left thigh as well.  No evidence of open wounds.  Bilateral dorsalis pedis pulses are 2+ palpable.    Diagnosis: Symptomatic left lower extremity varicose veins, recurrent.    Plan: Explained the pathophysiology of disease to her in detail.  My recommendation would be for her to start wearing compression stockings.  She tells me that the symptoms of the thigh varicose veins are minimal.  Therefore I think it would be better for compliance and comfort that she wear a 20 to 30 mm knee-high stockings.  I will see her back in 3 months for a follow-up visit left lower extremity venous ultrasound for reflux.

## 2020-10-07 NOTE — PATIENT INSTRUCTIONS

## 2020-10-07 NOTE — PROGRESS NOTES
"Mary Carrera is a 70 year old female who is being evaluated via a billable video visit.      The patient has been notified of following:     \"This video visit will be conducted via a call between you and your physician/provider. We have found that certain health care needs can be provided without the need for an in-person physical exam.  This service lets us provide the care you need with a video conversation.  If a prescription is necessary we can send it directly to your pharmacy.  If lab work is needed we can place an order for that and you can then stop by our lab to have the test done at a later time.    Video visits are billed at different rates depending on your insurance coverage.  Please reach out to your insurance provider with any questions.    If during the course of the call the physician/provider feels a video visit is not appropriate, you will not be charged for this service.\"    Patient has given verbal consent for Video visit? Yes  How would you like to obtain your AVS? MyChart  If you are dropped from the video visit, the video invite should be resent to: Send to e-mail at: [email or web address]  Will anyone else be joining your video visit? No        Video-Visit Details    Type of service:  Video Visit    Video Start Time: 11:28 AM  Video End Time: 11:39 AM    Originating Location (pt. Location): Home    Distant Location (provider location):  [facility]     Platform used for Video Visit: Mandy Garcia MD      Obstructive Sleep Apnea - PAP Follow-Up Visit:    Chief Complaint   Patient presents with     CPAP Follow Up       Mary Carrera comes in today for follow-up of their moderate sleep apnea, managed with CPAP.      PSG on 1/23/2017 showed an AHI of 29.7 per hour.     Overall, she rates the experience with PAP as 10 (0 poor, 10 great). The mask is comfortable. The mask is not leaking.  She is not snoring with the mask on. She is not having gasp " arousals.  She is not having significant oral/nasal dryness. The pressure settings are comfortable.     She uses nasal pillows.     Bedtime is typically 11 pm. Usually it takes about 15 minutes to fall asleep with the mask on. Wake time is typically 8 am.  Patient is using PAP therapy 8-9 hours per night. The patient is usually getting 8-9 hours of sleep per night.    She does feel rested in the morning.    Total score - Blair: 4 (10/7/2020  9:00 AM)    ResMed     Auto-PAP 9-18 cmH2O download:  30/30 total days of use. 0 nonuse days. 100% days with >4 hours use.  Average use 8 hours  24 minutes per day. Median Leak 7.5 L/min. 95%ile Leak 26.5 L/min. CPAP 95% pressure 13.6 cm. AHI 1.5    Reviewed by team:  Allergies  Meds            Reviewed by provider:              Past Medical History:   Diagnosis Date     Arthritis      Chronic depressive personality disorder      Depression, major, recurrent, in complete remission (H) 8/16/2013     Hearing loss      Hypertension      Other and unspecified alcohol dependence, in remission      Sleep apnea     uses CPAP machine     Family History   Problem Relation Age of Onset     Heart Disease Mother      Alcohol/Drug Father      Cancer No family hx of      Diabetes No family hx of      Social History     Tobacco Use     Smoking status: Never Smoker     Smokeless tobacco: Never Used   Substance Use Topics     Alcohol use: Yes     Alcohol/week: 4.2 standard drinks     Types: 5 Standard drinks or equivalent per week     Comment: wine on the weekends     Drug use: No       Problem List:  Patient Active Problem List    Diagnosis Date Noted     Morbid obesity (H) 02/07/2020     Priority: Medium     Essential hypertension, benign 05/09/2019     Priority: Medium     Dizziness 02/26/2019     Priority: Medium     Asymmetrical right sensorineural hearing loss 02/26/2019     Priority: Medium     Alcohol dependence in remission (H) 01/11/2019     Priority: Medium     S/P total knee  arthroplasty, left 09/12/2017     Priority: Medium     MANNIE (obstructive sleep apnea) 01/25/2017     Priority: Medium     Diagnostic Study  Sleep Study 1/23/2017 - (226.0 lbs) AHI 29.7, RDI 36.7, Supine AHI -, REM AHI 91.6, Low O2 75.3%, Time Spent ?88% 16.9 minutes / Time Spent ?89% 34.8 minutes.       Osteoarthritis of right knee 12/05/2016     Priority: Medium     ACP (advance care planning) 04/08/2014     Priority: Medium       Advance Care Planning 1/20/2016: ACP Review of Chart / Resources Provided:  Reviewed chart for advance care plan.  Mary Carrera has no plan or code status on file. Discussed available resources and provided with information. Confirmed code status reflects current choices pending further ACP discussions.  Confirmed/documented legally designated decision maker(s). Added by Mahnaz Britt  Advance Care Planning 9/7/2017: ACP Review of Chart / Resources Provided:  Reviewed chart for advance care plan.  Mary Carrera has no plan or code status on file however states presence of ACP document. Copy requested.   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 Provider to review       Hearing loss 09/29/2006     Priority: Medium     Problem list name updated by automated process. Provider to review       Obesity 01/30/2004     Priority: Medium     Problem list name updated by automated process. Provider to review       Leiomyoma of uterus 01/14/2002     Priority: Medium     Problem list name updated by automated process. Provider to review            LMP 10/15/2003       ROS: 10 point ROS neg other than the  symptoms noted above in the HPI.    Exam:  Constitutional: healthy, alert, active and no distress  HENT: Normocephalic   Respiratory: negative findings: normal respiratory rate and rhythm  Musculoskeletal: extremities normal- no gross deformities noted  Skin: no suspicious lesions or rashes  Neurologic: negative findings: speech normal, mental status intact  Psychiatric: mentation appears normal and affect normal/bright    Impression/Plan:     Moderate sleep apnea.     -  Tolerating PAP well. Daytime symptoms are stable.    - I reviewed data from her CPAP device which shows regular compliance and normal AHI.     Plan:     1. Continue auto PAP therapy     Mary Carrera will follow up in about 1 year(s).       Edy Garcia MD    CC:  Lynn Lim,

## 2020-10-08 ENCOUNTER — VIRTUAL VISIT (OUTPATIENT)
Dept: SLEEP MEDICINE | Facility: CLINIC | Age: 70
End: 2020-10-08
Payer: MEDICARE

## 2020-10-08 DIAGNOSIS — G47.33 OSA (OBSTRUCTIVE SLEEP APNEA): Primary | ICD-10-CM

## 2020-10-08 PROCEDURE — 99213 OFFICE O/P EST LOW 20 MIN: CPT | Mod: 95 | Performed by: INTERNAL MEDICINE

## 2020-10-10 NOTE — NURSING NOTE
1 year reminder post card has been created to send to pt for follow up.  Mayra Murphy [facility]

## 2020-11-12 ENCOUNTER — OFFICE VISIT (OUTPATIENT)
Dept: VASCULAR SURGERY | Facility: CLINIC | Age: 70
End: 2020-11-12
Attending: SURGERY
Payer: MEDICARE

## 2020-11-12 ENCOUNTER — ANCILLARY PROCEDURE (OUTPATIENT)
Dept: ULTRASOUND IMAGING | Facility: CLINIC | Age: 70
End: 2020-11-12
Attending: SURGERY
Payer: MEDICARE

## 2020-11-12 DIAGNOSIS — I83.812 VARICOSE VEINS OF LEFT LOWER EXTREMITY WITH PAIN: ICD-10-CM

## 2020-11-12 DIAGNOSIS — I83.892 SYMPTOMATIC VARICOSE VEINS OF LEFT LOWER EXTREMITY: Primary | ICD-10-CM

## 2020-11-12 PROCEDURE — 99213 OFFICE O/P EST LOW 20 MIN: CPT | Performed by: SURGERY

## 2020-11-12 PROCEDURE — 93971 EXTREMITY STUDY: CPT | Mod: LT | Performed by: SURGERY

## 2020-11-12 NOTE — PROGRESS NOTES
I had the pleasure of seeing Mrs. Carrera in the vein clinic today.  He is a pleasant 70-year-old female who was seen previously for symptomatic left lower extremity varicose veins which has slowly redeveloped over the past few years.  She has been religiously wearing compression stockings for 3 months but continues to have significant symptoms of pain, heaviness and burning in the lower extremities.    Today she underwent duplex sonography of the left lower extremity varicose veins.  There are multiple varicose veins at the saphenofemoral junction where the anterior accessory saphenous vein is draining into the common femoral vein.  These groin varicosities are also draining outlet for the the great saphenous vein.  Interestingly, the proximal segment of the great saphenous vein has previously been excised surgically many decades ago.  The left great saphenous vein itself is incompetent and drains into the varicose veins which then drain into the anterior accessory saphenous vein and then into the saphenofemoral junction.  The small saphenous vein is competent.    For long-term control of symptoms I would recommend radiofrequency ablation of the great saphenous vein to the point where it joins the varicosities in the groin.  I would also recommend radiofrequency ablation of the anterior accessory saphenous vein as that too is incompetent.  For the multiple groin varicosities I would recommend ultrasound-guided foam sclerotherapy.  For the varicosities in the left thigh and calf area we will do standard phlebectomies.

## 2020-11-12 NOTE — LETTER
11/12/2020         RE: Mary Carrera  [address]  [place] [address]        Dear Colleague,    Thank you for referring your patient, Mary Carrera, to the [facility] [employer]. Please see a copy of my visit note below.    I had the pleasure of seeing Mrs. Carrera in the vein clinic today.  He is a pleasant 70-year-old female who was seen previously for symptomatic left lower extremity varicose veins which has slowly redeveloped over the past few years.  She has been religiously wearing compression stockings for 3 months but continues to have significant symptoms of pain, heaviness and burning in the lower extremities.    Today she underwent duplex sonography of the left lower extremity varicose veins.  There are multiple varicose veins at the saphenofemoral junction where the anterior accessory saphenous vein is draining into the common femoral vein.  These groin varicosities are also draining outlet for the the great saphenous vein.  Interestingly, the proximal segment of the great saphenous vein has previously been excised surgically many decades ago.  The left great saphenous vein itself is incompetent and drains into the varicose veins which then drain into the anterior accessory saphenous vein and then into the saphenofemoral junction.  The small saphenous vein is competent.    For long-term control of symptoms I would recommend radiofrequency ablation of the great saphenous vein to the point where it joins the varicosities in the groin.  I would also recommend radiofrequency ablation of the anterior accessory saphenous vein as that too is incompetent.  For the multiple groin varicosities I would recommend ultrasound-guided foam sclerotherapy.  For the varicosities in the left thigh and calf area we will do standard phlebectomies.          Again, thank you for allowing me to participate in the care of your patient.        Sincerely,        Aaron Desai MD

## 2020-12-10 DIAGNOSIS — I83.812 VARICOSE VEINS OF LEFT LOWER EXTREMITY WITH PAIN: Primary | ICD-10-CM

## 2020-12-12 DIAGNOSIS — E78.2 MIXED HYPERLIPIDEMIA: ICD-10-CM

## 2020-12-14 RX ORDER — ATORVASTATIN CALCIUM 20 MG/1
TABLET, FILM COATED ORAL
Qty: 90 TABLET | Refills: 1 | COMMUNITY
Start: 2020-12-14

## 2020-12-14 NOTE — TELEPHONE ENCOUNTER
Last OV 6/17/2020 with instructions to follow up in 6 months.    Refused Prescriptions:                       Disp   Refills    atorvastatin (LIPITOR) 20 MG tablet [Pharm*90 tab*1        Sig: TAKE 1 TABLET(20 MG) BY MOUTH DAILY  Refused By: MARIO WAGONER  Reason for Refusal: Patient needs appointment

## 2020-12-17 DIAGNOSIS — I10 ESSENTIAL HYPERTENSION, BENIGN: ICD-10-CM

## 2020-12-17 RX ORDER — LISINOPRIL/HYDROCHLOROTHIAZIDE 10-12.5 MG
TABLET ORAL
Qty: 90 TABLET | Refills: 1 | COMMUNITY
Start: 2020-12-17

## 2020-12-17 NOTE — TELEPHONE ENCOUNTER
Mary Carrera is requesting a refill of:    Refused Prescriptions:                       Disp   Refills    lisinopril-hydrochlorothiazide (ZESTORETIC*90 tab*1        Sig: TAKE 1 TABLET BY MOUTH EVERY DAY  Refused By: DUNCAN GARCIA  Reason for Refusal: Patient needs appointment      Pt lats seen 06/17/20 advised to return in 6 months. Pt due for FASTING med recheck

## 2020-12-21 DIAGNOSIS — E78.2 MIXED HYPERLIPIDEMIA: ICD-10-CM

## 2020-12-21 DIAGNOSIS — I10 ESSENTIAL HYPERTENSION, BENIGN: ICD-10-CM

## 2020-12-21 RX ORDER — LISINOPRIL/HYDROCHLOROTHIAZIDE 10-12.5 MG
1 TABLET ORAL DAILY
Qty: 20 TABLET | Refills: 0 | COMMUNITY
Start: 2020-12-21 | End: 2021-01-06

## 2020-12-21 RX ORDER — ATORVASTATIN CALCIUM 20 MG/1
20 TABLET, FILM COATED ORAL DAILY
Qty: 20 TABLET | Refills: 0 | COMMUNITY
Start: 2020-12-21 | End: 2021-01-06

## 2020-12-21 NOTE — TELEPHONE ENCOUNTER
Called in 20 tabs of her prescriptions to get her through until her appt on 1/6/2020.        Signed Prescriptions:                        Disp   Refills    atorvastatin (LIPITOR) 20 MG tablet        20 tab*0        Sig: Take 1 tablet (20 mg) by mouth daily  Authorizing Provider: YASH WASHBURN  Ordering User: HUGO DECKER    lisinopril-hydrochlorothiazide (ZESTORETIC*20 tab*0        Sig: Take 1 tablet by mouth daily  Authorizing Provider: YASH WASHBURN  Ordering User: HUGO DECKER

## 2020-12-29 ENCOUNTER — TELEPHONE (OUTPATIENT)
Dept: VASCULAR SURGERY | Facility: CLINIC | Age: 70
End: 2020-12-29

## 2020-12-29 DIAGNOSIS — I83.812 VARICOSE VEINS OF LEFT LOWER EXTREMITY WITH PAIN: Primary | ICD-10-CM

## 2020-12-29 NOTE — TELEPHONE ENCOUNTER
[facility]    Preoperative Nurse Call    Procedure: Left leg VNUS closure GSV, ASV (med nec), 0.5unit phlebs (med nec), u/s sclero (med nec) 1/1 session  Date: 1/7/2021  Time: 0900  Check in time: 0800    Called and left detailed message. Informed patient: when to check in (0800) to sign consent, to bring their preop medications in their original bottle with them (2mg ativan, 0.1mg clonidine, Amoxicillin 2 Gms). Patient will take the medications after signing the consent to the procedure. Instructed patient to wear a mask, wear loose-fitting comfortable clothing, and bring their compression hose. Ensured patient has a /someone that will be responsible for them the rest of the day. No visitors are allowed in the clinic, so  can either stay in the parking lot or leave. If  does leave, IF POSSIBLE, we ask that they do not go more than 15-20 mins from our clinic. Once procedure is completed, we will keep patient in recovery for 30-45 mins, and call  with aftercare instructions. Informed patient, that if possible, they should sit in the backseat to elevate their leg on the ride home.    Pt needs thigh-high compression hose for procedure. Status pt wants to purchase hose from clinic . Measurements indicate she could be a petite. Left message for her to call me back. We do not sell petite size.    Special instructions: Hold ASA for 3 days (stop on 1/4/2021)  Amoxicillin 2Gms (4 capsules of Amoxicillin 500mg) for bilateral total knees     Special COVID-19 instructions: Patient aware they need to wear a mask to our clinic and during their procedure. Patient aware that their  cannot come into the clinic and must wait in car. Nurse will call pt's  when procedure is over.    Patient understands that if they have any of the following symptoms (fever, cough, shortness of breath, rash), they need to notify us immediately to cancel their procedure and will have to reschedule for a later  date.    Patient is in agreement with preoperative information and has no further questions.    Toby Rizvi RN

## 2020-12-30 RX ORDER — LORAZEPAM 1 MG/1
TABLET ORAL
Qty: 2 TABLET | Refills: 0 | Status: SHIPPED | OUTPATIENT
Start: 2020-12-30 | End: 2021-01-11

## 2020-12-30 RX ORDER — AMOXICILLIN 500 MG/1
CAPSULE ORAL
Qty: 4 CAPSULE | Refills: 0 | Status: SHIPPED | OUTPATIENT
Start: 2020-12-30 | End: 2021-01-06

## 2020-12-30 RX ORDER — CLONIDINE HYDROCHLORIDE 0.1 MG/1
TABLET ORAL
Qty: 1 TABLET | Refills: 0 | Status: SHIPPED | OUTPATIENT
Start: 2020-12-30 | End: 2021-01-11

## 2021-01-06 ENCOUNTER — OFFICE VISIT (OUTPATIENT)
Dept: FAMILY MEDICINE | Facility: CLINIC | Age: 71
End: 2021-01-06

## 2021-01-06 VITALS
TEMPERATURE: 98.2 F | HEIGHT: 64 IN | BODY MASS INDEX: 39.61 KG/M2 | DIASTOLIC BLOOD PRESSURE: 80 MMHG | SYSTOLIC BLOOD PRESSURE: 130 MMHG | HEART RATE: 68 BPM | OXYGEN SATURATION: 97 % | RESPIRATION RATE: 20 BRPM | WEIGHT: 232 LBS

## 2021-01-06 DIAGNOSIS — Z79.899 ENCOUNTER FOR LONG-TERM (CURRENT) USE OF MEDICATIONS: ICD-10-CM

## 2021-01-06 DIAGNOSIS — F33.42 DEPRESSION, MAJOR, RECURRENT, IN COMPLETE REMISSION (H): ICD-10-CM

## 2021-01-06 DIAGNOSIS — I10 ESSENTIAL HYPERTENSION, BENIGN: ICD-10-CM

## 2021-01-06 DIAGNOSIS — E78.2 MIXED HYPERLIPIDEMIA: Primary | ICD-10-CM

## 2021-01-06 LAB
ALBUMIN SERPL-MCNC: 4.4 G/DL (ref 3.6–5.1)
ALBUMIN/GLOB SERPL: 1.8 {RATIO} (ref 1–2.5)
ALP SERPL-CCNC: 42 U/L (ref 33–130)
ALT 1742-6: 11 U/L (ref 0–32)
AST 1920-8: 20 U/L (ref 0–35)
BILIRUB SERPL-MCNC: 0.7 MG/DL (ref 0.2–1.2)
BUN SERPL-MCNC: 23 MG/DL (ref 7–25)
BUN/CREATININE RATIO: 24.2 (ref 6–22)
CALCIUM SERPL-MCNC: 9.2 MG/DL (ref 8.6–10.3)
CHLORIDE SERPLBLD-SCNC: 107.6 MMOL/L (ref 98–110)
CHOLEST SERPL-MCNC: 183 MG/DL (ref 0–199)
CHOLEST/HDLC SERPL: 3 {RATIO} (ref 0–5)
CO2 SERPL-SCNC: 26.2 MMOL/L (ref 20–32)
CREAT SERPL-MCNC: 0.95 MG/DL (ref 0.7–1.18)
GLOBULIN, CALCULATED - QUEST: 2.5 (ref 1.9–3.7)
GLUCOSE SERPL-MCNC: 102 MG/DL (ref 60–99)
HDLC SERPL-MCNC: 57 MG/DL (ref 40–150)
LDLC SERPL CALC-MCNC: 103 MG/DL (ref 0–130)
POTASSIUM SERPL-SCNC: 4.38 MMOL/L (ref 3.5–5.3)
PROT SERPL-MCNC: 6.9 G/DL (ref 6.1–8.1)
SODIUM SERPL-SCNC: 142.8 MMOL/L (ref 135–146)
TRIGL SERPL-MCNC: 115 MG/DL (ref 0–149)

## 2021-01-06 PROCEDURE — 80053 COMPREHEN METABOLIC PANEL: CPT | Performed by: FAMILY MEDICINE

## 2021-01-06 PROCEDURE — 80061 LIPID PANEL: CPT | Performed by: FAMILY MEDICINE

## 2021-01-06 PROCEDURE — 36415 COLL VENOUS BLD VENIPUNCTURE: CPT | Performed by: FAMILY MEDICINE

## 2021-01-06 PROCEDURE — 99214 OFFICE O/P EST MOD 30 MIN: CPT | Performed by: FAMILY MEDICINE

## 2021-01-06 RX ORDER — ATORVASTATIN CALCIUM 20 MG/1
20 TABLET, FILM COATED ORAL DAILY
Qty: 90 TABLET | Refills: 1 | Status: SHIPPED | OUTPATIENT
Start: 2021-01-06 | End: 2021-01-26

## 2021-01-06 RX ORDER — LISINOPRIL/HYDROCHLOROTHIAZIDE 10-12.5 MG
1 TABLET ORAL DAILY
Qty: 90 TABLET | Refills: 1 | Status: SHIPPED | OUTPATIENT
Start: 2021-01-06 | End: 2021-01-12

## 2021-01-06 RX ORDER — SERTRALINE HYDROCHLORIDE 100 MG/1
100 TABLET, FILM COATED ORAL DAILY
Qty: 90 TABLET | Refills: 3 | Status: SHIPPED | OUTPATIENT
Start: 2021-01-06 | End: 2021-01-26

## 2021-01-06 ASSESSMENT — ANXIETY QUESTIONNAIRES
2. NOT BEING ABLE TO STOP OR CONTROL WORRYING: NOT AT ALL
3. WORRYING TOO MUCH ABOUT DIFFERENT THINGS: NOT AT ALL
6. BECOMING EASILY ANNOYED OR IRRITABLE: NOT AT ALL
5. BEING SO RESTLESS THAT IT IS HARD TO SIT STILL: NOT AT ALL
GAD7 TOTAL SCORE: 0
IF YOU CHECKED OFF ANY PROBLEMS ON THIS QUESTIONNAIRE, HOW DIFFICULT HAVE THESE PROBLEMS MADE IT FOR YOU TO DO YOUR WORK, TAKE CARE OF THINGS AT HOME, OR GET ALONG WITH OTHER PEOPLE: NOT DIFFICULT AT ALL
7. FEELING AFRAID AS IF SOMETHING AWFUL MIGHT HAPPEN: NOT AT ALL
1. FEELING NERVOUS, ANXIOUS, OR ON EDGE: NOT AT ALL

## 2021-01-06 ASSESSMENT — MIFFLIN-ST. JEOR: SCORE: 1562.35

## 2021-01-06 ASSESSMENT — PATIENT HEALTH QUESTIONNAIRE - PHQ9
SUM OF ALL RESPONSES TO PHQ QUESTIONS 1-9: 0
5. POOR APPETITE OR OVEREATING: NOT AT ALL

## 2021-01-06 NOTE — PATIENT INSTRUCTIONS
Await labs  I've explained to her that drugs of the SSRI class can have side effects such as weight gain, sexual dysfunction, insomnia, headache, nausea. These medications are generally effective at alleviating symptoms of anxiety and/or depression. Let me know if significant side effects do occur.      1)  Medication: continue current medication regimen unchanged  2)  Low fat, low cholesterol diet  3)  Regular aerobic exercise  4)  Recheck in 6 months, sooner should new symptoms or   problems arise.    Patient Education: Reviewed risks of elevated lipids and principles   of treatment.

## 2021-01-06 NOTE — PROGRESS NOTES
"Multiple issues/ multiple notes    1. Hypertension/ hyperlipidemia  2. Depression    1. SUBJECTIVE:  Mary Carrera is an 70 year old female who presents for evaluation of   Hyperlipidemia and hypertension. She has been diagnosed in the past as having   combined hyperlipidemia. She indicates that she is feeling well   and denies any symptoms of cardiovascular disease. Specifically   denies chest pain, palpitations, dyspnea, orthopnea, PND,   claudication or peripheral edema. Treatment modalities employed to   this point include diet, regular aerobic exercise and Lipitor. Current medication   regimen is as listed below. Patient denies any side effects of   medication.    Family history: positive for cardiovascular disease  Age at diagnosis of hyperlipidemia: 56 and hypertension age 67  Cardiovascular risk factors: family history, lipids, hypertension, obesity and sleep apnea    Current Outpatient Medications   Medication     aspirin (ASA) 81 MG chewable tablet     atorvastatin (LIPITOR) 20 MG tablet     diphenhydrAMINE HCl (BENADRYL ALLERGY PO)     ibuprofen (ADVIL/MOTRIN) 800 MG tablet     lisinopril-hydrochlorothiazide (ZESTORETIC) 10-12.5 MG tablet     multivitamin  with lutein (OCUVITE WITH LTEIN) CAPS     sertraline (ZOLOFT) 100 MG tablet     amoxicillin (AMOXIL) 500 MG capsule     cloNIDine (CATAPRES) 0.1 MG tablet     LORazepam (ATIVAN) 1 MG tablet     No current facility-administered medications for this visit.      Allergies   Allergen Reactions     No Known Allergies        Social History     Tobacco Use     Smoking status: Never Smoker     Smokeless tobacco: Never Used   Substance Use Topics     Alcohol use: Yes     Alcohol/week: 4.2 standard drinks     Types: 5 Standard drinks or equivalent per week     Comment: wine on the weekends       OBJECTIVE:  /80 (BP Location: Right arm, Patient Position: Sitting, Cuff Size: Adult Large)   Pulse 68   Temp 98.2  F (36.8  C) (Oral)   Ht 1.626 m (5' 4\")   " Wt 105.2 kg (232 lb)   LMP 10/15/2003   SpO2 97%   BMI 39.82 kg/m    Repeat BP R arm seated = 130/80  with regular size cuff.  Skin: negative  Fundi: deferred  Lungs: negative, Percussion normal. Good diaphragmatic excursion. Lungs clear  Heart: negative, PMI normal. No lifts, heaves, or thrills. RRR. No murmurs, clicks gallops or rub  Abd; The abdomen is soft without tenderness, guarding, mass or organomegaly. Bowel sounds are normal. No CVA tenderness or inguinal adenopathy noted.  obese  Peripheral pulses: radial=4/4, femoral=4/4, popliteal=4/4, dorsalis pedis=4/4,  BMI : Body mass index is 39.82 kg/m .    ASSESSMENT:(E78.2) Mixed hyperlipidemia  (primary encounter diagnosis)  Plan: Lipid Panel (BFP), VENOUS COLLECTION,         atorvastatin (LIPITOR) 20 MG tablet        1)  Medication: continue current medication regimen unchanged  2)  Low fat, low cholesterol diet  3)  Regular aerobic exercise  4)  Recheck in 6 months, sooner should new symptoms or   problems arise.    Patient Education: Reviewed risks of elevated lipids and principles   of treatment.        (I10) Essential hypertension, benign  Plan: Comprehensive Metobolic Panel (BFP), VENOUS         COLLECTION, lisinopril-hydrochlorothiazide         (ZESTORETIC) 10-12.5 MG tablet        1)  Medication: continue current medication regimen unchanged  2)  Dietary sodium restriction  3)  Regular aerobic exercise  4)  Recheck in 6 months, sooner should new symptoms or   problems arise.    Patient Education: Reviewed risks of hypertension and principles of   treatment.        (F33.42) Depression, major, recurrent, in complete remission (H)  Plan: sertraline (ZOLOFT) 100 MG tablet        See below. I've explained to her that drugs of the SSRI class can have side effects such as weight gain, sexual dysfunction, insomnia, headache, nausea. These medications are generally effective at alleviating symptoms of anxiety and/or depression. Let me know if significant side  effects do occur.  Refills one year    (Z79.899) Encounter for long-term (current) use of medications  Plan: Lipid Panel (BFP), Comprehensive Metobolic         Panel (BFP), VENOUS COLLECTION                          2. SUBJECTIVE:  Mary Carrera is an 70 year old female who presents for follow-up   of depressive symptoms.  Initially evaluated in her 40's.  Notes from that visit reviewed.  Current symptoms include   none. Symptoms that have subjectively improved include depressed mood, hopelessness, diminished interest or pleasure in activities, weight gain, psychomotor retardation (slowness of thought and reaction), feelings of worthlessness, feelings of guilt, difficulty with concentration, anxiety, irritablility.  Previous and current treatment modalities   employed include individual therapy and medication(s) Zoloft (sertraline).     Organic causes of depression present: sleep apnea    Current Outpatient Medications   Medication     aspirin (ASA) 81 MG chewable tablet     atorvastatin (LIPITOR) 20 MG tablet     diphenhydrAMINE HCl (BENADRYL ALLERGY PO)     ibuprofen (ADVIL/MOTRIN) 800 MG tablet     lisinopril-hydrochlorothiazide (ZESTORETIC) 10-12.5 MG tablet     multivitamin  with lutein (OCUVITE WITH LTEIN) CAPS     sertraline (ZOLOFT) 100 MG tablet     amoxicillin (AMOXIL) 500 MG capsule     cloNIDine (CATAPRES) 0.1 MG tablet     LORazepam (ATIVAN) 1 MG tablet     No current facility-administered medications for this visit.      Allergies   Allergen Reactions     No Known Allergies      Side effects of medication: none    Social History     Tobacco Use     Smoking status: Never Smoker     Smokeless tobacco: Never Used   Substance Use Topics     Alcohol use: Yes     Alcohol/week: 4.2 standard drinks     Types: 5 Standard drinks or equivalent per week     Comment: wine on the weekends         Neurologic: acoustic neuroma followed MRI  Psychiatric: excessive stress  Endocrine: negative    OBJECTIVE:  /80  "(BP Location: Right arm, Patient Position: Sitting, Cuff Size: Adult Large)   Pulse 68   Temp 98.2  F (36.8  C) (Oral)   Resp 20   Ht 1.626 m (5' 4\")   Wt 105.2 kg (232 lb)   LMP 10/15/2003   SpO2 97%   BMI 39.82 kg/m    Mental Status Examination  Posture and motor behavior: negative  Dress, grooming, personal hygiene: negative  Facial expression: negative  Speech: negative  Mood: negative  Coherency and relevance of thought: negative  Thought content: negative  Perceptions: negative  Orientation: negative  Attention and concentration: negative  Memory: : negative  Information: negative  Vocabulary: negative  Abstract reasoning: negative  Judgment: negative    General appearance: healthy, alert, no distress, cooperative, smiling and over weight  Eyes: conjunctivae/corneas clear. PERRL, EOM's intact. Fundi benign  Ears: negative  Oropharynx: Lips, mucosa, and tongue normal. Teeth and gums normal.  Neck: Neck supple. No adenopathy. Thyroid symmetric, normal size,, Carotids without bruits.  Lungs: negative, Percussion normal. Good diaphragmatic excursion. Lungs clear  Heart: negative, PMI normal. No lifts, heaves, or thrills. RRR. No murmurs, clicks gallops or rub  Abdomen: Abdomen soft, non-tender. BS normal. No masses, organomegaly  Neuro: Gait normal. Reflexes normal and symmetric. Sensation grossly WNL.      "

## 2021-01-06 NOTE — NURSING NOTE
Mary is here today for a fasting med recheck.    Pre-visit Screening:  Immunizations:  up to date  Colonoscopy:  is up to date  Mammogram: is up to date  Asthma Action Test/Plan:  NA  PHQ9:  Done today  GAD7:  DOne today  Questioned patient about current smoking habits Pt. has never smoked.  Ok to leave detailed message on voice mail for today's visit only Yes, phone # [phone]

## 2021-01-07 ENCOUNTER — OFFICE VISIT (OUTPATIENT)
Dept: VASCULAR SURGERY | Facility: CLINIC | Age: 71
End: 2021-01-07

## 2021-01-07 ENCOUNTER — OFFICE VISIT (OUTPATIENT)
Dept: VASCULAR SURGERY | Facility: CLINIC | Age: 71
End: 2021-01-07
Payer: COMMERCIAL

## 2021-01-07 VITALS — OXYGEN SATURATION: 94 % | DIASTOLIC BLOOD PRESSURE: 53 MMHG | SYSTOLIC BLOOD PRESSURE: 106 MMHG | HEART RATE: 60 BPM

## 2021-01-07 DIAGNOSIS — I83.812 VARICOSE VEINS OF LEFT LOWER EXTREMITY WITH PAIN: Primary | ICD-10-CM

## 2021-01-07 PROCEDURE — 37765 STAB PHLEB VEINS XTR 10-20: CPT | Mod: LT | Performed by: SURGERY

## 2021-01-07 PROCEDURE — 36475 ENDOVENOUS RF 1ST VEIN: CPT | Mod: LT | Performed by: SURGERY

## 2021-01-07 PROCEDURE — 99207 PR NO CHARGE NURSE ONLY: CPT

## 2021-01-07 RX ORDER — HYDROCODONE BITARTRATE AND ACETAMINOPHEN 5; 325 MG/1; MG/1
1 TABLET ORAL EVERY 6 HOURS PRN
Qty: 10 TABLET | Refills: 0 | Status: SHIPPED | OUTPATIENT
Start: 2021-01-07 | End: 2021-01-11

## 2021-01-07 ASSESSMENT — ANXIETY QUESTIONNAIRES: GAD7 TOTAL SCORE: 0

## 2021-01-07 NOTE — PROGRESS NOTES
Pre-procedure Nursing Note    Mary Carrera presents to clinic for Vein Procedure  .   /Person Responsible for Patient: Priyanka (daughter)  Phone Number: [phone]    Prophylactic Medication:Antibiotics, Amoxicillin 2g,   Time Taken: 0815   Sedation Medication: Ativan, 2mg ,   Time Taken: 0815 and Clonidine, 0.1mg,   Time Taken: 0815  Compression Stockings: Patient bought thigh high in clinic today  The procedure is being performed on LLE.  Patient understanding of procedure matches consent? YES    Patient's pre-procedure medications verified by RNLynn.    Lynn Feldman, RN on 1/7/2021 at 8:15 AM

## 2021-01-07 NOTE — LETTER
1/7/2021         RE: Mary Carrera  [address]  [place] [address]        Dear Colleague,    Thank you for referring your patient, Mary Carrera, to the [facility] [place]. Please see a copy of my visit note below.    Pre-procedure Nursing Note    Mary Carrera presents to clinic for Vein Procedure  .   /Person Responsible for Patient: Priyanka (daughter)  Phone Number: [phone]    Prophylactic Medication:Antibiotics, Amoxicillin 2g,   Time Taken: 0815   Sedation Medication: Ativan, 2mg ,   Time Taken: 0815 and Clonidine, 0.1mg,   Time Taken: 0815  Compression Stockings: Patient bought thigh high in clinic today  The procedure is being performed on LLE.  Patient understanding of procedure matches consent? YES    Patient's pre-procedure medications verified by RNLynn.    Lynn Feldman RN on 1/7/2021 at 8:15 AM        Preoperative diagnosis:    1.  Symptomatic left lower extremity recurrent varicose veins with failure of conservative management.  2.  Left great saphenous vein incompetence.    Post operative diagnosis:  Same    Procedure:  1.  Left great saphenous vein radiofrequency ablation.  2.  Multiple stab phlebectomies of left lower extremity, medically necessary.    Preoperative medications: 1 mg ativan, 0.1 mg clonidine      Procedures    Operative description  Indications: This is a very pleasant 70-year-old female with longstanding history of varicose veins.  Previously she had undergone what appears to be a partial excision of the great saphenous vein from the saphenofemoral junction.  She has recurrent varicose veins which are symptomatic and there is failure of conservative management.  On ultrasonography there is a short accessory saphenous vein which gives rise to a cluster of large varicosities in the thigh.  There leg and calf varicosities that are arising from the remainder of the great saphenous vein which is also incompetent.  Ablation and  phlebectomies advised for definitive management.    Procedure: Patient was identified and then taken to the procedure room.  The left lower extremity was prepped and a sterile surgical field was created.  Preprocedure timeout was conducted.  I started first by interrogating the left great saphenous vein.  The left great saphenous vein was giving multiple large varicosities in the calf and lower leg area.  I traced it up to the proximal thigh.  As known from her previous surgery the saphenofemoral junction itself has been ligated.  Then I separately mapped out the anterior accessory saphenous vein.  This was a short vein segment which was giving out a cluster of varicose veins in the anterior thigh.  Local anesthetic was administered at the level of the left knee and the left great saphenous vein was accessed with a micropuncture needle followed by placement of a microwire.  Then we placed a 7 [place] sheath.  The radiofrequency ablation catheter was advanced through this great saphenous vein up to the proximal thigh with the vein had been ligated.  Generous amounts of tumescent anesthetic was administered.  The entire length of the great saphenous vein was ablated.  The second segment was ablated 3 times while the other segments were ablated twice each.  9 radiofrequency ablation cycles were done and 4 segments were treated.    VNUS: Left great saphenous vein    Stab phlebectomy: 19    EBL: 10 mL    Flowsheet Data 1/7/2021   Procedure Start Time:  9:28 AM   Prep: Chloraprep   Side: Left   Tx Length (cm): LEFT GSV 35   Junction (cm): LEFT GSV 0   RF Cycles: LEFT GSV 9   RF TX Time (Minutes): LEFT GSV 3:00   # PHLEB Sites: 19   Sedation taken: Yes   Pre Pt. Physical / Cognitive Limitations: WNL   TOTAL Local anesthesia Injected (ml): 5   Max Volume Local Anesthesia (ml): 11   TOTAL Tumescent Injected volume (ml): 500   Max Volume Tumescent (ml): 572   Post Pt. Physical / Cognitive Limitations: WNL   Procedure End Time:  10:13 AM   D/C Instructions given, states readiness to leave and escorted to car: Yes       Patient's blood pressure, pulse and pulse oximetry were continuously monitored throughout the procedure under my direct supervision and was stable during the procedure.    Patient recovered in our suites and discharged home with their family with postoperative instructions and follow up.     I also called the patient's daughter, Priyanka, and updated her on our progress.  Aaron Desai MD        Again, thank you for allowing me to participate in the care of your patient.        Sincerely,        Aaron Desai MD

## 2021-01-07 NOTE — PROGRESS NOTES
Patient purchased 1 pair(s) of natural, thigh high, open-toe, size 3 compression hose from the clinic today.     Informed patient all compression hose purchases are final.    Lynn Feldman RN on 1/7/2021 at 9:30 AM

## 2021-01-07 NOTE — PROGRESS NOTES
Preoperative diagnosis:    1.  Symptomatic left lower extremity recurrent varicose veins with failure of conservative management.  2.  Left great saphenous vein incompetence.    Post operative diagnosis:  Same    Procedure:  1.  Left great saphenous vein radiofrequency ablation.  2.  Multiple stab phlebectomies of left lower extremity, medically necessary.    Preoperative medications: 1 mg ativan, 0.1 mg clonidine      Phlebectomy    Date/Time: 1/7/2021 12:06 PM  Performed by: Aaron Desai MD  Authorized by: Aaron Desia MD     1st Assist:  KYM Rodriguez  Circulator:  Lynn Feldman RN  Procedure:  Phlebectomies  Type:  Medically Necessary  Procedure side:  Left  Stabs:  10-20  Patient tolerance:  Patient tolerated the procedure well with no immediate complications  Wrap/Hose:  Wraps  Evangelina Closure    Date/Time: 1/7/2021 12:07 PM  Performed by: Aaron Desai MD  Authorized by: Aaron Desai MD     Time out: Immediately prior to the procedure a time out was called    Preparation: Patient was prepped and draped in usual sterile fashion    1st Assist:  KYM Rodriguez  Circulator:  Lynn Feldman RN  Procedure:  VNUS  Procedure side:  Left  Vein Treated:  GSV  Patient tolerance:  Patient tolerated the procedure well with no immediate complications        Operative description  Indications: This is a very pleasant 70-year-old female with longstanding history of varicose veins.  Previously she had undergone what appears to be a partial excision of the great saphenous vein from the saphenofemoral junction.  She has recurrent varicose veins which are symptomatic and there is failure of conservative management.  On ultrasonography there is a short accessory saphenous vein which gives rise to a cluster of large varicosities in the thigh.  There leg and calf varicosities that are arising from the remainder of the great saphenous vein which is also incompetent.   Ablation and phlebectomies advised for definitive management.    Procedure: Patient was identified and then taken to the procedure room.  The left lower extremity was prepped and a sterile surgical field was created.  Preprocedure timeout was conducted.  I started first by interrogating the left great saphenous vein.  The left great saphenous vein was giving multiple large varicosities in the calf and lower leg area.  I traced it up to the proximal thigh.  As known from her previous surgery the saphenofemoral junction itself has been ligated.  Then I separately mapped out the anterior accessory saphenous vein.  This was a short vein segment which was giving out a cluster of varicose veins in the anterior thigh.  Local anesthetic was administered at the level of the left knee and the left great saphenous vein was accessed with a micropuncture needle followed by placement of a microwire.  Then we placed a 7 Yi sheath.  The radiofrequency ablation catheter was advanced through this great saphenous vein up to the proximal thigh with the vein had been ligated.  Generous amounts of tumescent anesthetic was administered.  The entire length of the great saphenous vein was ablated.  The second segment was ablated 3 times while the other segments were ablated twice each.  9 radiofrequency ablation cycles were done and 4 segments were treated.    VNUS: Left great saphenous vein    Stab phlebectomy: 19    EBL: 10 mL    Flowsheet Data 1/7/2021   Procedure Start Time:  9:28 AM   Prep: Chloraprep   Side: Left   Tx Length (cm): LEFT GSV 35   Junction (cm): LEFT GSV 0   RF Cycles: LEFT GSV 9   RF TX Time (Minutes): LEFT GSV 3:00   # PHLEB Sites: 19   Sedation taken: Yes   Pre Pt. Physical / Cognitive Limitations: WNL   TOTAL Local anesthesia Injected (ml): 5   Max Volume Local Anesthesia (ml): 11   TOTAL Tumescent Injected volume (ml): 500   Max Volume Tumescent (ml): 572   Post Pt. Physical / Cognitive Limitations: WNL    Procedure End Time: 10:13 AM   D/C Instructions given, states readiness to leave and escorted to car: Yes       Patient's blood pressure, pulse and pulse oximetry were continuously monitored throughout the procedure under my direct supervision and was stable during the procedure.    Patient recovered in our suites and discharged home with their family with postoperative instructions and follow up.     I also called the patient's daughter, Priyanka, and updated her on our progress.  Aaron Desai MD

## 2021-01-07 NOTE — ADDENDUM NOTE
Addended by: LINDA TAYLOR on: 1/7/2021 12:07 PM     Modules accepted: Level of Service, SmartSet

## 2021-01-07 NOTE — PROGRESS NOTES
[facility] Procedure Note    Mary Carrera  January 7, 2021    Mary Carrera is a 70 year old year old female. She presents for Vein Procedure  .    /53   Pulse 60   LMP 10/15/2003   SpO2 94%     Flowsheet Data 1/7/2021   Procedure Start Time:  9:28 AM   Prep: Chloraprep   Side: Left   Tx Length (cm): LEFT GSV 35   Junction (cm): LEFT GSV 0   RF Cycles: LEFT GSV 9   RF TX Time (Minutes): LEFT GSV 3:00   # PHLEB Sites: 19   Sedation taken: Yes   Pre Pt. Physical / Cognitive Limitations: WNL   TOTAL Local anesthesia Injected (ml): 5   Max Volume Local Anesthesia (ml): 11   TOTAL Tumescent Injected volume (ml): 500   Max Volume Tumescent (ml): 572   Post Pt. Physical / Cognitive Limitations: WNL   Procedure End Time: 10:13 AM   D/C Instructions given, states readiness to leave and escorted to car: Yes       Procedures    Aaron Desai MD

## 2021-01-09 ENCOUNTER — HEALTH MAINTENANCE LETTER (OUTPATIENT)
Age: 71
End: 2021-01-09

## 2021-01-11 ENCOUNTER — OFFICE VISIT (OUTPATIENT)
Dept: VASCULAR SURGERY | Facility: CLINIC | Age: 71
End: 2021-01-11
Attending: SURGERY
Payer: COMMERCIAL

## 2021-01-11 ENCOUNTER — ANCILLARY PROCEDURE (OUTPATIENT)
Dept: ULTRASOUND IMAGING | Facility: CLINIC | Age: 71
End: 2021-01-11
Attending: SURGERY
Payer: COMMERCIAL

## 2021-01-11 DIAGNOSIS — I83.812 VARICOSE VEINS OF LEFT LOWER EXTREMITY WITH PAIN: ICD-10-CM

## 2021-01-11 DIAGNOSIS — Z09 POSTOP CHECK: Primary | ICD-10-CM

## 2021-01-11 PROCEDURE — 99207 PR NO CHARGE NURSE ONLY: CPT

## 2021-01-11 PROCEDURE — 93971 EXTREMITY STUDY: CPT | Mod: LT | Performed by: SURGERY

## 2021-01-11 NOTE — LETTER
1/11/2021         RE: Mary Carrera  [address]  [place] [address]        Dear Colleague,    Thank you for referring your patient, Mary Carrera, to the [facility] [place]. Please see a copy of my visit note below.    [facility]    Postoperative Nurse Note    Patient is here for their 72 hour postoperative visit.    Procedure: Left leg VNUS closure GSV (med nec), 0.5 unit phlebs (med nec)  Procedure Date: 1/7/2021  Surgeon: Dr. Desai    Ultrasound Result: The left GSV is closed 35.5mm from the SFJ to the proximal calf. No evidence of LLE DVT.    Physical Exam: Incisions are approximated without signs of infection.  Ecchymosis: moderate  Swelling: minimal  Paresthesia: pt denies numbness    Patient Questions or Concerns: Pt is doing well and has no concerns. Denies pain.    Reviewed postoperative instructions with patient and provided them with written material of common things to expect from their procedure.     Patient's Next Vein Clinic Appointment: 6 week post op and possible U/S sclero (med nec, session 1/1, no PA req.) (2/18/21).     Sent pt's order form (that has U/S sclero ordered on it) to HIM. Pt did not have U/S sclero on the day of her procedure. Therefore, Dr. Desai will look with ultrasound at pt's 6 week post op appt.     Lynn Feldman RN      Again, thank you for allowing me to participate in the care of your patient.        Sincerely,         Vein Nurse

## 2021-01-11 NOTE — PROGRESS NOTES
[facility]    Postoperative Nurse Note    Patient is here for their 72 hour postoperative visit.    Procedure: Left leg VNUS closure GSV (med nec), 0.5 unit phlebs (med nec)  Procedure Date: 1/7/2021  Surgeon: Dr. Desai    Ultrasound Result: The left GSV is closed 35.5mm from the SFJ to the proximal calf. No evidence of LLE DVT.    Physical Exam: Incisions are approximated without signs of infection.  Ecchymosis: moderate  Swelling: minimal  Paresthesia: pt denies numbness    Patient Questions or Concerns: Pt is doing well and has no concerns. Denies pain.    Reviewed postoperative instructions with patient and provided them with written material of common things to expect from their procedure.     Patient's Next Vein Clinic Appointment: 6 week post op and possible U/S sclero (med nec, session 1/1, no PA req.) (2/18/21).     Sent pt's order form (that has U/S sclero ordered on it) to HIM. Pt did not have U/S sclero on the day of her procedure. Therefore, Dr. Desai will look with ultrasound at pt's 6 week post op appt.     Lynn Feldman, RN

## 2021-01-12 DIAGNOSIS — I10 ESSENTIAL HYPERTENSION, BENIGN: ICD-10-CM

## 2021-01-12 RX ORDER — LISINOPRIL/HYDROCHLOROTHIAZIDE 10-12.5 MG
1 TABLET ORAL DAILY
Qty: 90 TABLET | Refills: 1 | COMMUNITY
Start: 2021-01-12 | End: 2021-01-26

## 2021-01-12 RX ORDER — LISINOPRIL/HYDROCHLOROTHIAZIDE 10-12.5 MG
TABLET ORAL
Qty: 20 TABLET | Refills: 0 | COMMUNITY
Start: 2021-01-12

## 2021-01-12 RX ORDER — LISINOPRIL/HYDROCHLOROTHIAZIDE 10-12.5 MG
TABLET ORAL
Qty: 20 TABLET | Refills: 0 | COMMUNITY
Start: 2021-01-12 | End: 2021-01-12

## 2021-01-12 NOTE — TELEPHONE ENCOUNTER
Refused Prescriptions:                       Disp   Refills    lisinopril-hydrochlorothiazide (ZESTORETIC*20 tab*0        Sig: TAKE 1 TABLET BY MOUTH EVERY DAY  Refused By: HUGO DECKER  Reason for Refusal: Should already have refills on file      Dr. Lim e-scribed qty 90 with 1 refill on 1/6/2021.

## 2021-01-12 NOTE — TELEPHONE ENCOUNTER
Mary Carrera is requesting a refill of:    Signed Prescriptions:                        Disp   Refills    lisinopril-hydrochlorothiazide (ZESTORETIC*90 tab*1        Sig: Take 1 tablet by mouth daily  Authorizing Provider: YASH WASHBURN  Ordering User: ORTEGA MUÑOZ    Rx went to local print. Faxed Rx to pharmacy

## 2021-01-12 NOTE — TELEPHONE ENCOUNTER
Pt took local print on purpose. Spoke with pt, she has all of her scripts at home. She is waiting to fill as she may be changing pharmacies with her new insurance. Shredded printed script from today.

## 2021-01-26 DIAGNOSIS — I10 ESSENTIAL HYPERTENSION, BENIGN: ICD-10-CM

## 2021-01-26 DIAGNOSIS — E78.2 MIXED HYPERLIPIDEMIA: ICD-10-CM

## 2021-01-26 DIAGNOSIS — F33.42 DEPRESSION, MAJOR, RECURRENT, IN COMPLETE REMISSION (H): ICD-10-CM

## 2021-01-26 RX ORDER — LISINOPRIL/HYDROCHLOROTHIAZIDE 10-12.5 MG
1 TABLET ORAL DAILY
Qty: 90 TABLET | Refills: 1 | Status: SHIPPED | OUTPATIENT
Start: 2021-01-26

## 2021-01-26 RX ORDER — SERTRALINE HYDROCHLORIDE 100 MG/1
100 TABLET, FILM COATED ORAL DAILY
Qty: 90 TABLET | Refills: 3 | Status: SHIPPED | OUTPATIENT
Start: 2021-01-26 | End: 2021-03-19

## 2021-01-26 RX ORDER — ATORVASTATIN CALCIUM 20 MG/1
20 TABLET, FILM COATED ORAL DAILY
Qty: 90 TABLET | Refills: 1 | Status: SHIPPED | OUTPATIENT
Start: 2021-01-26

## 2021-01-26 NOTE — TELEPHONE ENCOUNTER
Pt called to say she needs medication switched to the mail order pharmacy. She also needs a short supply which I called in.     Pending Prescriptions:                       Disp   Refills    lisinopril-hydrochlorothiazide (ZESTORETI*90 tab*1            Sig: Take 1 tablet by mouth daily    atorvastatin (LIPITOR) 20 MG tablet       90 tab*1            Sig: Take 1 tablet (20 mg) by mouth daily    sertraline (ZOLOFT) 100 MG tablet         90 tab*3            Sig: Take 1 tablet (100 mg) by mouth daily

## 2021-02-18 ENCOUNTER — OFFICE VISIT (OUTPATIENT)
Dept: VASCULAR SURGERY | Facility: CLINIC | Age: 71
End: 2021-02-18
Payer: COMMERCIAL

## 2021-02-18 DIAGNOSIS — I83.812 VARICOSE VEINS OF LEFT LOWER EXTREMITY WITH PAIN: Primary | ICD-10-CM

## 2021-02-18 PROCEDURE — 99213 OFFICE O/P EST LOW 20 MIN: CPT | Performed by: SURGERY

## 2021-02-18 NOTE — LETTER
2/18/2021         RE: Mary Carrera  [address]  [place] [address]        Dear Colleague,    Thank you for referring your patient, Mary Carrera, to the [facility] [place]. Please see a copy of my visit note below.    Mrs. Carrera returns to the vein clinic today.  She is a very pleasant 70-year-old female who had developed recurrent left thigh varicose veins.  She had previously had a left great saphenous vein ligation.  We did an ablation from the mid calf to the mid thigh great saphenous vein and stab phlebectomies in the anterior thigh.  This procedure was performed on 7 January 2021.    At that point I felt that if her symptoms are not completely relieved then we will look at the anterior accessory saphenous vein and treated with ultrasound-guided sclerotherapy.    She tells me that her symptoms have completely resolved.  On my examination all the varicose veins that were located in the area of the anterior thigh have responded to the phlebectomy.    I ultrasounded the anterior accessory saphenous vein and there is a short stump that is patent otherwise the tributaries of varicose veins that were leading up into it has resulted in the thrombosis of the anterior accessory saphenous vein.    I have advised her that I would not recommend sclerotherapy at this time and she can follow-up as needed if he has recurrence of symptomatic varicose veins in the anterior thigh.  She has verbalized her understanding.      Again, thank you for allowing me to participate in the care of your patient.        Sincerely,        Aaron Desai MD

## 2021-02-18 NOTE — PROGRESS NOTES
Mrs. Carrera returns to the vein clinic today.  She is a very pleasant 70-year-old female who had developed recurrent left thigh varicose veins.  She had previously had a left great saphenous vein ligation.  We did an ablation from the mid calf to the mid thigh great saphenous vein and stab phlebectomies in the anterior thigh.  This procedure was performed on 7 January 2021.    At that point I felt that if her symptoms are not completely relieved then we will look at the anterior accessory saphenous vein and treated with ultrasound-guided sclerotherapy.    She tells me that her symptoms have completely resolved.  On my examination all the varicose veins that were located in the area of the anterior thigh have responded to the phlebectomy.    I ultrasounded the anterior accessory saphenous vein and there is a short stump that is patent otherwise the tributaries of varicose veins that were leading up into it has resulted in the thrombosis of the anterior accessory saphenous vein.    I have advised her that I would not recommend sclerotherapy at this time and she can follow-up as needed if he has recurrence of symptomatic varicose veins in the anterior thigh.  She has verbalized her understanding.

## 2021-05-07 DIAGNOSIS — E78.2 MIXED HYPERLIPIDEMIA: ICD-10-CM

## 2021-05-07 DIAGNOSIS — I10 ESSENTIAL HYPERTENSION, BENIGN: ICD-10-CM

## 2021-05-07 RX ORDER — LISINOPRIL/HYDROCHLOROTHIAZIDE 10-12.5 MG
TABLET ORAL
Qty: 14 TABLET | COMMUNITY
Start: 2021-05-07

## 2021-05-07 RX ORDER — ATORVASTATIN CALCIUM 20 MG/1
TABLET, FILM COATED ORAL
Qty: 14 TABLET | COMMUNITY
Start: 2021-05-07

## 2021-05-07 NOTE — TELEPHONE ENCOUNTER
Received incoming refill request for  Pending Prescriptions:                       Disp   Refills    lisinopril-hydrochlorothiazide (ZESTORETI*14 tab*             Sig: TAKE 1 TABLET BY MOUTH EVERY DAY    atorvastatin (LIPITOR) 20 MG tablet [Phar*14 tab*             Sig: TAKE 1 TABLET BY MOUTH EVERY DAY    Patient last had a refill of these medications on 1/26/21 for a 90 day supply with 1 refill so no refill should be needed at this time.

## 2021-06-02 ENCOUNTER — OFFICE VISIT (OUTPATIENT)
Dept: FAMILY MEDICINE | Facility: CLINIC | Age: 71
End: 2021-06-02

## 2021-06-02 VITALS
BODY MASS INDEX: 39.22 KG/M2 | RESPIRATION RATE: 20 BRPM | OXYGEN SATURATION: 95 % | HEIGHT: 65 IN | HEART RATE: 83 BPM | DIASTOLIC BLOOD PRESSURE: 70 MMHG | WEIGHT: 235.4 LBS | TEMPERATURE: 98.3 F | SYSTOLIC BLOOD PRESSURE: 120 MMHG

## 2021-06-02 DIAGNOSIS — I10 ESSENTIAL HYPERTENSION, BENIGN: ICD-10-CM

## 2021-06-02 DIAGNOSIS — H26.9 CATARACT OF LEFT EYE, UNSPECIFIED CATARACT TYPE: Primary | ICD-10-CM

## 2021-06-02 DIAGNOSIS — Z01.818 PRE-OP EXAM: ICD-10-CM

## 2021-06-02 LAB
HEMOGLOBIN: 12.8 G/DL (ref 11.7–15.7)
POTASSIUM SERPL-SCNC: 4.36 MMOL/L (ref 3.5–5.3)

## 2021-06-02 PROCEDURE — 36415 COLL VENOUS BLD VENIPUNCTURE: CPT | Performed by: FAMILY MEDICINE

## 2021-06-02 PROCEDURE — 85018 HEMOGLOBIN: CPT | Performed by: FAMILY MEDICINE

## 2021-06-02 PROCEDURE — 99214 OFFICE O/P EST MOD 30 MIN: CPT | Performed by: FAMILY MEDICINE

## 2021-06-02 PROCEDURE — 84132 ASSAY OF SERUM POTASSIUM: CPT | Performed by: FAMILY MEDICINE

## 2021-06-02 RX ORDER — DIMENHYDRINATE 50 MG
1 TABLET ORAL DAILY
COMMUNITY
End: 2023-08-21

## 2021-06-02 ASSESSMENT — MIFFLIN-ST. JEOR: SCORE: 1584.68

## 2021-06-02 NOTE — LETTER
[facility]  [address]  [place]  [address]        For Emergencies:  Call 911      For Clinic Appointments:   [phone]                     [facility]  [address]  [address]  [facility] [address]  Phone: [phone]  Fax: [phone]  Primary Provider: Lynn Washburn  Pre-op Performing Provider: LYNN WASHBURN      PREOPERATIVE EVALUATION:  Today's date: 6/2/2021    Mary Carrera is a 71 year old female who presents for a preoperative evaluation.    Surgical Information:  Surgery/Procedure: left cataract   Surgery Location: [facility] surgery [place]  Surgeon: Dr. Lewis  Surgery Date: 6/9/2021  Time of Surgery: TBD  Where patient plans to recover: At home with family  Fax number for surgical facility: [phone]    Type of Anesthesia Anticipated: to be determined    Assessment & Plan      1. No - Have you ever had a heart attack or stroke?  2. No - Have you ever had surgery on your heart or blood vessels, such as a stent, coronary (heart) bypass, or surgery on an artery in the head, neck, heart, or legs?  3. No - Do you have chest pain when you are physically active?  4. No - Do you have a history of heart failure?  5. No - Do you currently have a cold, bronchitis, or symptoms of other respiratory (head and chest) infections?  6. No - Do you have a cough, shortness of breath, or wheezing?  7. No - Do you or anyone in your family have a history of blood clots?  8. No - Do you or anyone in your family have a serious bleeding problem, such as long-lasting bleeding after surgeries or cuts?  9. No - Have you ever had anemia or been told to take iron pills?  10. No - Have you had any abnormal blood loss such as black, tarry or bloody stools, or abnormal vaginal bleeding?  11. No - Have you ever had a blood transfusion?  12. Yes - Are you willing to have a  blood transfusion if it is medically needed before, during, or after your surgery?  13. No - Have you or anyone in your family ever had problems with anesthesia (sedation for surgery)?  14. Yes - Do you have sleep apnea, excessive snoring, or daytime drowsiness? Sleep apnea Do you have a CPAP machine? yes  15. No - Do you have any artifical heart valves or other implanted medical devices, such as a pacemaker, defibrillator, or continuous glucose monitor?  16. Yes - Do you have any artifical joints? Bilateral knee replacement  17. No - Are you allergic to latex?  18. No - Is there any chance that you may be pregnant?    The proposed surgical procedure is considered LOW risk.    (H26.9) Cataract of left eye, unspecified cataract type  (primary encounter diagnosis)            (Z01.818) Pre-op exam      (I10) Essential hypertension, benign        Medication Instructions:   - Bleeding risk is low for this procedure (e.g. dental, skin, cataract). Stop aspirin and ibuprofen along with flaxseed one week before    RECOMMENDATION:  APPROVAL GIVEN to proceed with proposed procedure, without further diagnostic evaluation.    30 minutes spent on the date of the encounter doing chart review, history and exam, documentation and further activities per the note        Subjective     HPI related to upcoming procedure: left eye cataract      Health Care Directive:  Patient does not have a Health Care Directive or Living Will: Discussed advance care planning with patient; however, patient declined at this time.    Preoperative Review of :   reviewed - no record of controlled substances prescribed.          Review of Systems  CONSTITUTIONAL: NEGATIVE for fever, chills, change in weight  ENT/MOUTH: NEGATIVE for ear, mouth and throat problems  RESP: NEGATIVE for significant cough or SOB  CV: NEGATIVE for chest pain, palpitations or peripheral edema    Patient Active Problem List    Diagnosis Date Noted     Morbid obesity (H)  02/07/2020     Priority: Medium     Essential hypertension, benign 05/09/2019     Priority: Medium     Dizziness 02/26/2019     Priority: Medium     Asymmetrical right sensorineural hearing loss 02/26/2019     Priority: Medium     Alcohol dependence in remission (H) 01/11/2019     Priority: Medium     S/P total knee arthroplasty, left 09/12/2017     Priority: Medium     MANNIE (obstructive sleep apnea) 01/25/2017     Priority: Medium     Diagnostic Study  Sleep Study 1/23/2017 - (226.0 lbs) AHI 29.7, RDI 36.7, Supine AHI -, REM AHI 91.6, Low O2 75.3%, Time Spent ?88% 16.9 minutes / Time Spent ?89% 34.8 minutes.       Osteoarthritis of right knee 12/05/2016     Priority: Medium     ACP (advance care planning) 04/08/2014     Priority: Medium       Advance Care Planning 1/20/2016: ACP Review of Chart / Resources Provided:  Reviewed chart for advance care plan.  Mary Carrera has no plan or code status on file. Discussed available resources and provided with information. Confirmed code status reflects current choices pending further ACP discussions.  Confirmed/documented legally designated decision maker(s). Added by Mahnaz Britt  Advance Care Planning 9/7/2017: ACP Review of Chart / Resources Provided:  Reviewed chart for advance care plan.  Mary Carrera has no plan or code status on file however states presence of ACP document. Copy requested.   Added by [facility] 09/06/2013     Priority: Medium     State Tier Level:  Tier 1  Status:  n/a  Care Coordinator:  n/a     See Letters for H Care Plan           Alopecia areata 08/16/2013     Priority: Medium     Pure hypercholesterolemia 08/17/2007     Priority: Medium     Benign neoplasm of cranial nerve (H) 09/29/2006     Priority: Medium     Problem list name updated by automated process. Provider to review       Hearing loss 09/29/2006     Priority: Medium     Problem list name updated by automated process. Provider to  review       Obesity 01/30/2004     Priority: Medium     Problem list name updated by automated process. Provider to review       Leiomyoma of uterus 01/14/2002     Priority: Medium     Problem list name updated by automated process. Provider to review        Past Medical History:   Diagnosis Date     Arthritis      Chronic depressive personality disorder      Depression, major, recurrent, in complete remission (H) 8/16/2013     Hearing loss      Hypertension      Other and unspecified alcohol dependence, in remission      Sleep apnea     uses CPAP machine     Past Surgical History:   Procedure Laterality Date     ARTHROPLASTY KNEE Right 12/05/2016    Procedure: ARTHROPLASTY KNEE;  Surgeon: Severo Conroy MD;  [facility]     ARTHROPLASTY KNEE Left 09/12/2017    Procedure: ARTHROPLASTY KNEE;  Left Total Knee Arthroplasty ;  Surgeon: Severo Conroy MD;  [facility]     C LIGATE FALLOPIAN TUBE  01/01/1982    Tubal Ligation     C REVISION OF CRANIAL NERVE  10/01/2006    recurrent acoustic neuroma     COLONOSCOPY  01/01/2007    WNL     COLONOSCOPY  05/13/2014    Procedure: COLONOSCOPY;  Surgeon: Priyanka Galvan MD;  Location: [facility]     HC REMOVAL OF LEG VEINS/ULCER  1988 & 1996     ROTATOR CUFF REPAIR RT/LT  10/01/2015    right     ZZC NONSPECIFIC PROCEDURE  06/01/1996    Acoustic Neuroma   left, dr tran/olivia     Current Outpatient Medications   Medication Sig Dispense Refill     aspirin (ASA) 81 MG chewable tablet Take 81 mg by mouth daily       atorvastatin (LIPITOR) 20 MG tablet Take 1 tablet (20 mg) by mouth daily 90 tablet 1     Flaxseed, Linseed, (FLAX SEED OIL) 1000 MG capsule Take 1 capsule by mouth daily       ibuprofen (ADVIL/MOTRIN) 800 MG tablet Take 1 tablet (800 mg) by mouth every 12 hours as needed for moderate pain 60 tablet 1     lisinopril-hydrochlorothiazide (ZESTORETIC) 10-12.5 MG tablet Take 1 tablet by mouth daily 90 tablet 1     multivitamin  with lutein (OCUVITE  "WITH LTEIN) CAPS Take 1 capsule by mouth daily       sertraline (ZOLOFT) 100 MG tablet TAKE 1 TABLET(100 MG) BY MOUTH DAILY 90 tablet 2       Allergies   Allergen Reactions     No Known Allergies         Social History     Tobacco Use     Smoking status: Never Smoker     Smokeless tobacco: Never Used   Substance Use Topics     Alcohol use: Yes     Alcohol/week: 4.2 standard drinks     Types: 5 Standard drinks or equivalent per week     Comment: wine on the weekends     Family History   Problem Relation Age of Onset     Heart Disease Mother      Alcohol/Drug Father      Cancer No family hx of      Diabetes No family hx of      History   Drug Use No         Objective     /70 (BP Location: Left arm, Patient Position: Sitting, Cuff Size: Adult Large)   Pulse 83   Temp 98.3  F (36.8  C) (Oral)   Ht 1.645 m (5' 4.75\")   Wt 106.8 kg (235 lb 6.4 oz)   LMP 10/15/2003   SpO2 95%   BMI 39.48 kg/m      Physical Exam  GENERAL APPEARANCE: healthy, alert and no distress  HENT: ear canals and TM's normal and nose and mouth without ulcers or lesions  RESP: lungs clear to auscultation - no rales, rhonchi or wheezes  CV: regular rate and rhythm, normal S1 S2, no S3 or S4 and no murmur, click or rub   ABDOMEN: soft, nontender, no HSM or masses and bowel sounds normal  NEURO: Normal strength and tone, sensory exam grossly normal, mentation intact and speech normal    Recent Labs   Lab Test 01/06/21 06/17/20   .8 143.5   POTASSIUM 4.38 4.27   CR 0.95 0.83        Diagnostics:  HGB: 12.8 gm/dl  Potassium : pending  Old attached EKG    Revised Cardiac Risk Index (RCRI):  The patient has the following serious cardiovascular risks for perioperative complications:   - No serious cardiac risks = 0 points     RCRI Interpretation: 1 point: Class II (low risk - 0.9% complication rate)           Signed Electronically by: Lynn Lim MD  Copy of this evaluation report is provided to requesting physician.     "

## 2021-06-02 NOTE — PROGRESS NOTES
[facility]  [address] [place] [address]  [address]  [facility] [address]  Phone: [phone]  Fax: [phone]  Primary Provider: Lynn Washburn  Pre-op Performing Provider: LYNN WASHBURN      PREOPERATIVE EVALUATION:  Today's date: 6/2/2021    Mary Carrera is a 71 year old female who presents for a preoperative evaluation.    Surgical Information:  Surgery/Procedure: left cataract   Surgery Location: [facility] surgery [place]  Surgeon: Dr. Lewis  Surgery Date: 6/9/2021  Time of Surgery: TBD  Where patient plans to recover: At home with family  Fax number for surgical facility: [phone]    Type of Anesthesia Anticipated: to be determined    Assessment & Plan      1. No - Have you ever had a heart attack or stroke?  2. No - Have you ever had surgery on your heart or blood vessels, such as a stent, coronary (heart) bypass, or surgery on an artery in the head, neck, heart, or legs?  3. No - Do you have chest pain when you are physically active?  4. No - Do you have a history of heart failure?  5. No - Do you currently have a cold, bronchitis, or symptoms of other respiratory (head and chest) infections?  6. No - Do you have a cough, shortness of breath, or wheezing?  7. No - Do you or anyone in your family have a history of blood clots?  8. No - Do you or anyone in your family have a serious bleeding problem, such as long-lasting bleeding after surgeries or cuts?  9. No - Have you ever had anemia or been told to take iron pills?  10. No - Have you had any abnormal blood loss such as black, tarry or bloody stools, or abnormal vaginal bleeding?  11. No - Have you ever had a blood transfusion?  12. Yes - Are you willing to have a blood transfusion if it is medically needed before, during, or after your surgery?  13. No - Have you or anyone in your family ever had problems with anesthesia (sedation for surgery)?  14. Yes - Do you have sleep apnea, excessive snoring, or daytime drowsiness?  Sleep apnea Do you have a CPAP machine? yes  15. No - Do you have any artifical heart valves or other implanted medical devices, such as a pacemaker, defibrillator, or continuous glucose monitor?  16. Yes - Do you have any artifical joints? Bilateral knee replacement  17. No - Are you allergic to latex?  18. No - Is there any chance that you may be pregnant?    The proposed surgical procedure is considered LOW risk.    (H26.9) Cataract of left eye, unspecified cataract type  (primary encounter diagnosis)            (Z01.818) Pre-op exam      (I10) Essential hypertension, benign        Medication Instructions:   - Bleeding risk is low for this procedure (e.g. dental, skin, cataract). Stop aspirin and ibuprofen along with flaxseed one week before    RECOMMENDATION:  APPROVAL GIVEN to proceed with proposed procedure, without further diagnostic evaluation.    30 minutes spent on the date of the encounter doing chart review, history and exam, documentation and further activities per the note        Subjective     HPI related to upcoming procedure: left eye cataract      Health Care Directive:  Patient does not have a Health Care Directive or Living Will: Discussed advance care planning with patient; however, patient declined at this time.    Preoperative Review of :   reviewed - no record of controlled substances prescribed.          Review of Systems  CONSTITUTIONAL: NEGATIVE for fever, chills, change in weight  ENT/MOUTH: NEGATIVE for ear, mouth and throat problems  RESP: NEGATIVE for significant cough or SOB  CV: NEGATIVE for chest pain, palpitations or peripheral edema    Patient Active Problem List    Diagnosis Date Noted     Morbid obesity (H) 02/07/2020     Priority: Medium     Essential hypertension, benign 05/09/2019     Priority: Medium     Dizziness 02/26/2019     Priority: Medium     Asymmetrical right sensorineural hearing loss 02/26/2019     Priority: Medium     Alcohol dependence in remission (H)  01/11/2019     Priority: Medium     S/P total knee arthroplasty, left 09/12/2017     Priority: Medium     MANNIE (obstructive sleep apnea) 01/25/2017     Priority: Medium     Diagnostic Study  Sleep Study 1/23/2017 - (226.0 lbs) AHI 29.7, RDI 36.7, Supine AHI -, REM AHI 91.6, Low O2 75.3%, Time Spent ?88% 16.9 minutes / Time Spent ?89% 34.8 minutes.       Osteoarthritis of right knee 12/05/2016     Priority: Medium     ACP (advance care planning) 04/08/2014     Priority: Medium       Advance Care Planning 1/20/2016: ACP Review of Chart / Resources Provided:  Reviewed chart for advance care plan.  Mary Carrera has no plan or code status on file. Discussed available resources and provided with information. Confirmed code status reflects current choices pending further ACP discussions.  Confirmed/documented legally designated decision maker(s). Added by Mahnaz Britt  Advance Care Planning 9/7/2017: ACP Review of Chart / Resources Provided:  Reviewed chart for advance care plan.  Mary Carrera has no plan or code status on file however states presence of ACP document. Copy requested.   Added by [facility] 09/06/2013     Priority: Medium     State Tier Level:  Tier 1  Status:  n/a  Care Coordinator:  n/a     See Letters for [facility] Care Plan           Alopecia areata 08/16/2013     Priority: Medium     Pure hypercholesterolemia 08/17/2007     Priority: Medium     Benign neoplasm of cranial nerve (H) 09/29/2006     Priority: Medium     Problem list name updated by automated process. Provider to review       Hearing loss 09/29/2006     Priority: Medium     Problem list name updated by automated process. Provider to review       Obesity 01/30/2004     Priority: Medium     Problem list name updated by automated process. Provider to review       Leiomyoma of uterus 01/14/2002     Priority: Medium     Problem list name updated by automated process. Provider to review        Past  Medical History:   Diagnosis Date     Arthritis      Chronic depressive personality disorder      Depression, major, recurrent, in complete remission (H) 8/16/2013     Hearing loss      Hypertension      Other and unspecified alcohol dependence, in remission      Sleep apnea     uses CPAP machine     Past Surgical History:   Procedure Laterality Date     ARTHROPLASTY KNEE Right 12/05/2016    Procedure: ARTHROPLASTY KNEE;  Surgeon: Severo Conroy MD;  [facility]     ARTHROPLASTY KNEE Left 09/12/2017    Procedure: ARTHROPLASTY KNEE;  Left Total Knee Arthroplasty ;  Surgeon: Severo Conroy MD;  [facility]     C LIGATE FALLOPIAN TUBE  01/01/1982    Tubal Ligation     C REVISION OF CRANIAL NERVE  10/01/2006    recurrent acoustic neuroma     COLONOSCOPY  01/01/2007    WNL     COLONOSCOPY  05/13/2014    Procedure: COLONOSCOPY;  Surgeon: Priyanka Galvan MD;  [facility]: [facility]     HC REMOVAL OF LEG VEINS/ULCER  1988 & 1996     ROTATOR CUFF REPAIR RT/LT  10/01/2015    right     ZZC NONSPECIFIC PROCEDURE  06/01/1996    Acoustic Neuroma   left, dr tran/olivia     Current Outpatient Medications   Medication Sig Dispense Refill     aspirin (ASA) 81 MG chewable tablet Take 81 mg by mouth daily       atorvastatin (LIPITOR) 20 MG tablet Take 1 tablet (20 mg) by mouth daily 90 tablet 1     Flaxseed, Linseed, (FLAX SEED OIL) 1000 MG capsule Take 1 capsule by mouth daily       ibuprofen (ADVIL/MOTRIN) 800 MG tablet Take 1 tablet (800 mg) by mouth every 12 hours as needed for moderate pain 60 tablet 1     lisinopril-hydrochlorothiazide (ZESTORETIC) 10-12.5 MG tablet Take 1 tablet by mouth daily 90 tablet 1     multivitamin  with lutein (OCUVITE WITH LTEIN) CAPS Take 1 capsule by mouth daily       sertraline (ZOLOFT) 100 MG tablet TAKE 1 TABLET(100 MG) BY MOUTH DAILY 90 tablet 2       Allergies   Allergen Reactions     No Known Allergies         Social History     Tobacco Use     Smoking status: Never Smoker  "    Smokeless tobacco: Never Used   Substance Use Topics     Alcohol use: Yes     Alcohol/week: 4.2 standard drinks     Types: 5 Standard drinks or equivalent per week     Comment: wine on the weekends     Family History   Problem Relation Age of Onset     Heart Disease Mother      Alcohol/Drug Father      Cancer No family hx of      Diabetes No family hx of      History   Drug Use No         Objective     /70 (BP Location: Left arm, Patient Position: Sitting, Cuff Size: Adult Large)   Pulse 83   Temp 98.3  F (36.8  C) (Oral)   Ht 1.645 m (5' 4.75\")   Wt 106.8 kg (235 lb 6.4 oz)   LMP 10/15/2003   SpO2 95%   BMI 39.48 kg/m      Physical Exam  GENERAL APPEARANCE: healthy, alert and no distress  HENT: ear canals and TM's normal and nose and mouth without ulcers or lesions  RESP: lungs clear to auscultation - no rales, rhonchi or wheezes  CV: regular rate and rhythm, normal S1 S2, no S3 or S4 and no murmur, click or rub   ABDOMEN: soft, nontender, no HSM or masses and bowel sounds normal  NEURO: Normal strength and tone, sensory exam grossly normal, mentation intact and speech normal    Recent Labs   Lab Test 01/06/21 06/17/20   .8 143.5   POTASSIUM 4.38 4.27   CR 0.95 0.83        Diagnostics:  HGB: 12.8 gm/dl  Potassium : pending  Old attached EKG    Revised Cardiac Risk Index (RCRI):  The patient has the following serious cardiovascular risks for perioperative complications:   - No serious cardiac risks = 0 points     RCRI Interpretation: 1 point: Class II (low risk - 0.9% complication rate)           Signed Electronically by: Lynn Lim MD  Copy of this evaluation report is provided to requesting physician.      "

## 2021-06-29 DIAGNOSIS — E78.2 MIXED HYPERLIPIDEMIA: ICD-10-CM

## 2021-06-29 DIAGNOSIS — I10 ESSENTIAL HYPERTENSION, BENIGN: ICD-10-CM

## 2021-06-29 RX ORDER — ATORVASTATIN CALCIUM 20 MG/1
TABLET, FILM COATED ORAL
Qty: 90 TABLET | Refills: 3 | COMMUNITY
Start: 2021-06-29

## 2021-06-29 RX ORDER — LISINOPRIL/HYDROCHLOROTHIAZIDE 10-12.5 MG
1 TABLET ORAL DAILY
Qty: 90 TABLET | Refills: 3 | COMMUNITY
Start: 2021-06-29

## 2021-06-29 NOTE — TELEPHONE ENCOUNTER
Received incoming refill request for  Pending Prescriptions:                       Disp   Refills    lisinopril-hydrochlorothiazide (ZESTORETI*90 tab*3            Sig: TAKE 1 TABLET BY MOUTH  DAILY    atorvastatin (LIPITOR) 20 MG tablet [Phar*90 tab*3            Sig: TAKE 1 TABLET BY MOUTH  DAILY    Patient last had a refill of these medications on 1/26/2021 for a 6 month supply so it is still to soon for a refill.

## 2021-08-02 DIAGNOSIS — I10 ESSENTIAL HYPERTENSION, BENIGN: ICD-10-CM

## 2021-08-03 RX ORDER — LISINOPRIL/HYDROCHLOROTHIAZIDE 10-12.5 MG
1 TABLET ORAL DAILY
Qty: 90 TABLET | Refills: 3 | COMMUNITY
Start: 2021-08-03

## 2021-08-03 NOTE — TELEPHONE ENCOUNTER
Pt is due for 6 month med check, per note 1/6/2021    Refused Prescriptions:                       Disp   Refills    lisinopril-hydrochlorothiazide (ZESTORETIC*90 tab*3        Sig: TAKE 1 TABLET BY MOUTH  DAILY  Refused By: MARIO NAVARRETE  Reason for Refusal: Patient needs appointment

## 2021-09-03 ENCOUNTER — TELEPHONE (OUTPATIENT)
Dept: FAMILY MEDICINE | Facility: CLINIC | Age: 71
End: 2021-09-03

## 2021-10-23 ENCOUNTER — HEALTH MAINTENANCE LETTER (OUTPATIENT)
Age: 71
End: 2021-10-23

## 2022-01-14 ENCOUNTER — VIRTUAL VISIT (OUTPATIENT)
Dept: SLEEP MEDICINE | Facility: CLINIC | Age: 72
End: 2022-01-14
Payer: COMMERCIAL

## 2022-01-14 DIAGNOSIS — G47.33 OSA (OBSTRUCTIVE SLEEP APNEA): Primary | ICD-10-CM

## 2022-01-14 PROCEDURE — 99213 OFFICE O/P EST LOW 20 MIN: CPT | Mod: GT | Performed by: NURSE PRACTITIONER

## 2022-01-14 ASSESSMENT — SLEEP AND FATIGUE QUESTIONNAIRES
HOW LIKELY ARE YOU TO NOD OFF OR FALL ASLEEP WHILE SITTING QUIETLY AFTER LUNCH WITHOUT ALCOHOL: WOULD NEVER DOZE
HOW LIKELY ARE YOU TO NOD OFF OR FALL ASLEEP WHILE LYING DOWN TO REST IN THE AFTERNOON WHEN CIRCUMSTANCES PERMIT: SLIGHT CHANCE OF DOZING
HOW LIKELY ARE YOU TO NOD OFF OR FALL ASLEEP WHILE WATCHING TV: WOULD NEVER DOZE
HOW LIKELY ARE YOU TO NOD OFF OR FALL ASLEEP WHILE SITTING AND READING: WOULD NEVER DOZE
HOW LIKELY ARE YOU TO NOD OFF OR FALL ASLEEP IN A CAR, WHILE STOPPED FOR A FEW MINUTES IN TRAFFIC: WOULD NEVER DOZE
HOW LIKELY ARE YOU TO NOD OFF OR FALL ASLEEP WHILE SITTING INACTIVE IN A PUBLIC PLACE: WOULD NEVER DOZE
HOW LIKELY ARE YOU TO NOD OFF OR FALL ASLEEP WHEN YOU ARE A PASSENGER IN A CAR FOR AN HOUR WITHOUT A BREAK: SLIGHT CHANCE OF DOZING
HOW LIKELY ARE YOU TO NOD OFF OR FALL ASLEEP WHILE SITTING AND TALKING TO SOMEONE: WOULD NEVER DOZE

## 2022-01-14 NOTE — PROGRESS NOTES
Mary Block is a 71 year old who is being evaluated via a billable video visit.      How would you like to obtain your AVS? MyChart  If the video visit is dropped, the invitation should be resent by: Send to e-mail at: [email or web address]  Will anyone else be joining your video visit? No     No vitals to report today.          Video-Visit Details    Type of service:  Video Visit  Video Start Time: 3:03 PM  Video End Time:  3:19 PM    Originating Location (pt. Location): Home    Distant Location (provider location):  [facility]     Platform used for Video Visit: MCH+      Obstructive Sleep Apnea - PAP Follow-Up Visit:    Chief Complaint   Patient presents with     Video Visit     annual visit       Mary Carrera comes in today for annual follow-up of their moderate sleep apnea, managed with CPAP.  The patient was last seen on 10/08/2020 with Dr. Edy Garcia in follow-up for MANNIE on CPAP. She reports that she is status-post left shoulder arthroscopic rotator cuff repair, subacromial decompression, completed on 07/21/21. She also notes that she developed COVID-19 infection this week and is doing fairly well.    DME: [facility]    Previous Study Results: [facility]  Date: 01/23/2017.  Weight 226.0 pounds.  AHI: 29.7/hr. REM AHI: 91.6/hr. RDI 36.7/hr. O2 no 75.3%.  Time spent less than or equal to 88% was 16.9 minutes.  PLMI: 50.0/hr. PLMAI: 21.1/hr.    Overall, she rates the experience with PAP as 10 (0 poor, 10 great). The mask is comfortable.  The mask is leaking with mask strap movement with position changes.  She is not snoring with the mask on. She is not having gasp arousals.  She is having significant oral dryness. The pressure is comfortable.    Her PAP interface is Nasal Pillows.    Bedtime is typically 9 - 11 PM. Usually it takes about 5-10 minutes to fall asleep with the mask on. Wake time is typically 7 - 8 AM.  Patient is using PAP therapy 8 hours 54 minutes per night. The  patient is usually getting 8-9 hours of sleep per night.    She does feel rested in the morning.    Total score - Louann: 2 (1/14/2022 10:40 AM)    EZRA Total Score: 2      ResMed - Airsense 10  Auto-PAP 9 - 18 cmH2O 30 day usage data:  12/14/21 - 01/12/22  100% of days with > 4 hours of use. 0/30 days with no use.   Average use 8 hours 54 minutes per day.   95%ile Leak 21.9 L/min.   CPAP 95% pressure 13.9 cm.   AHI 1.0 events per hour.       Past medical/surgical history, family history, social history, medications and allergies were reviewed.      Problem List:  Patient Active Problem List    Diagnosis Date Noted     Morbid obesity (H) 02/07/2020     Priority: Medium     Essential hypertension, benign 05/09/2019     Priority: Medium     Dizziness 02/26/2019     Priority: Medium     Asymmetrical right sensorineural hearing loss 02/26/2019     Priority: Medium     Replacing diagnoses that were inactivated after the 10/1/2021 regulatory import.       Alcohol dependence in remission (H) 01/11/2019     Priority: Medium     S/P total knee arthroplasty, left 09/12/2017     Priority: Medium     MANNIE (obstructive sleep apnea) 01/25/2017     Priority: Medium     Diagnostic Study  Sleep Study 1/23/2017 - (226.0 lbs) AHI 29.7, RDI 36.7, Supine AHI -, REM AHI 91.6, Low O2 75.3%, Time Spent ?88% 16.9 minutes / Time Spent ?89% 34.8 minutes.       Osteoarthritis of right knee 12/05/2016     Priority: Medium     ACP (advance care planning) 04/08/2014     Priority: Medium       Advance Care Planning 1/20/2016: ACP Review of Chart / Resources Provided:  Reviewed chart for advance care plan.  Mary Carrera has no plan or code status on file. Discussed available resources and provided with information. Confirmed code status reflects current choices pending further ACP discussions.  Confirmed/documented legally designated decision maker(s). Added by Mahnaz Britt  Advance Care Planning 9/7/2017: ACP Review of Chart / Resources  "Provided:  Reviewed chart for advance care plan.  Mary Carrera has no plan or code status on file however states presence of ACP document. Copy requested.   Added by Joycelyn Thomas                   [facility] 09/06/2013     Priority: Medium     State Tier Level:  Tier 1  Status:  n/a  Care Coordinator:  n/a     See Letters for H Care Plan           Alopecia areata 08/16/2013     Priority: Medium     Pure hypercholesterolemia 08/17/2007     Priority: Medium     Benign neoplasm of cranial nerve (H) 09/29/2006     Priority: Medium     Problem list name updated by automated process. Provider to review       Hearing loss 09/29/2006     Priority: Medium     Problem list name updated by automated process. Provider to review       Obesity 01/30/2004     Priority: Medium     Problem list name updated by automated process. Provider to review       Leiomyoma of uterus 01/14/2002     Priority: Medium     Problem list name updated by automated process. Provider to review        Current Outpatient Medications   Medication     aspirin (ASA) 81 MG chewable tablet     atorvastatin (LIPITOR) 20 MG tablet     Flaxseed, Linseed, (FLAX SEED OIL) 1000 MG capsule     ibuprofen (ADVIL/MOTRIN) 800 MG tablet     lisinopril-hydrochlorothiazide (ZESTORETIC) 10-12.5 MG tablet     multivitamin  with lutein (OCUVITE WITH LTEIN) CAPS     sertraline (ZOLOFT) 100 MG tablet     No current facility-administered medications for this visit.     No vitals taken at this visit.    LMP 10/15/2003     Estimated body mass index is 39.48 kg/m  as calculated from the following:    Height as of 6/2/21: 1.645 m (5' 4.75\").    Weight as of 6/2/21: 106.8 kg (235 lb 6.4 oz).    Impression/Plan:  1. MANNIE (obstructive sleep apnea)  - Comprehensive DME    Moderate Sleep apnea. Tolerating PAP well. Daytime symptoms are improved.  Her Holcomb score today is 2 which is within normal limits.  Insomnia severity index score is also 2 and within normal limits.  " The patient continues to benefit from use of her CPAP device.    A review of her download data shows excellent use and compliance and moderate MANNIE that is well controlled on current pressure settings.    A comprehensive DME order was placed for new mask and supplies which was sent to [facility] today.  Continue AutoPap therapy.    Mary Carrera will follow up in about 1 year.     20 minutes spent with patient, on the date of the encounter counseling, consulting, chart review/documentation, and coordinating plan of care.      DEX Venegas CNP  Sleep Medicine      CC:  Lynn Lim,     This note was written with the assistance of the Dragon voice-dictation technology software. The final document, although reviewed, may contain errors. For corrections, please contact the office.

## 2022-01-14 NOTE — PROGRESS NOTES
"Mary Block is a 71 year old who is being evaluated via a billable video visit.      How would you like to obtain your AVS? MyChart  If the video visit is dropped, the invitation should be resent by: Send to e-mail at: [email or web address]  Will anyone else be joining your video visit? No     No vitals to report today.    {If patient encounters technical issues they should call [phone] :[phone]}    Video Start Time: {video visit start/end time for provider to select:152948}  Video-Visit Details    Type of service:  Video Visit    Video End Time:{video visit start/end time for provider to select:152948}    Originating Location (pt. Location): {video visit patient location:653764::\"Home\"}    Distant Location (provider location):  [facility]     Platform used for Video Visit: {Virtual Visit Platforms:737681::\"HomeCon\"}  "

## 2022-01-14 NOTE — PATIENT INSTRUCTIONS
"MY INFORMATION ON SLEEP APNEA-  Mary Carrera    DOCTOR : DEX Venegas CNP  SLEEP CENTER :      MY CONTACT NUMBER:   [facility]  [phone]  [facility]   [phone]  [facility]   [phone]      [facility]  [phone]  [facility]   [phone]    [facility] [place] [address], [address]  [place] [address]  Phone: [phone]    Hours:  Mon - Fri: 8:00 a.m. - 4:30 p.m.  Sat: Closed  Sun: Closed      Key Points:  1. What is Obstructive Sleep Apnea (MANNIE)? MANNIE is the most common type of sleep apnea. Apnea literally means, \"without breath.\" It is characterized by repetitive pauses in breathing, despite continued effort to breathe, and is usually associated with a reduction in blood oxygen saturation. Apneas can last 10 to over 60 seconds. It is caused by narrowing or collapse of the upper airway as muscles relax during sleep.   2. What are the consequences of MANNIE? Symptoms include: daytime sleepiness- possibly increasing the risk of falling asleep while driving, unrefreshing/restless sleep, snoring, insomnia, waking frequently to urinate, waking with heartburn or reflux, reduced concentration and memory, and morning headaches. Other health consequences may include development of high blood pressure. Untreated MANNIE also can contribute to heart disease, stroke and diabetes.   3. What are the treatment options? In most situations, sleep apnea is a lifelong disease that must be managed with daily therapy. Continuous Positive Airway (CPAP) is the most reliable treatment. A mouthguard to hold your jaw forward is usually the next most reliable option. Other options include postioning devices (to keep you off your back), nasal valves, tongue retaining device, weight loss, surgery. There is more detail about these options toward the end of this " document.  4. What are the most important things to remember about using CPAP?     WHERE CAN I FIND MORE INFORMATION?    American Academy of Sleep Medicine Patient information on sleep disorders:  http://yoursleep.aasmnet.org    CPAP -  WHY AND HOW?                 Continuous positive airway pressure, or CPAP, is the most effective treatment for obstructive sleep apnea. It works by blowing room air, through a mask, to hold your throat open. A decision to use CPAP is a major step forward in the pursuit of a healthier life. The successful use of CPAP will help you breathe easier, sleep better and live healthier. Using CPAP can be a positive experience if you keep these matthews points in mind:  1. Commitment  CPAP is not a quick fix for your problem. It involves a long-term commitment to improve your sleep and your health.    2. Communication  Stay in close communication with both your sleep doctor and your CPAP supplier. Ask lots of questions and seek help when you need it.    3. Consistency  Use CPAP all night, every night and for every nap. You will receive the maximum health benefits from CPAP when you use it every time that you sleep. This will also make it easier for your body to adjust to the treatment.    4. Correction  The first machine and mask that you try may not be the best ones for you. Work with your sleep doctor and your CPAP supplier to make corrections to your equipment selection. Ask about trying a different type of machine or mask if you have ongoing problems. Make sure that your mask is a good fit and learn to use your equipment properly.    5. Challenge  Tell a family member or close friend to ask you each morning if you used your CPAP the previous night. Have someone to challenge you to give it your best effort.    6. Connection   Your adjustment to CPAP will be easier if you are able to connect with others who use the same treatment. Ask your sleep doctor if there is a support group in your area for  "people who have sleep apnea, or look for one on the Internet.  7. Comfort   Increase your level of comfort by using a saline spray, decongestant or heated humidifier if CPAP irritates your nose, mouth or throat. Use your unit's \"ramp\" setting to slowly get used to the air pressure level. There may be soft pads you can buy that will fit over your mask straps. Look on www.CPAP.com for accessories that can help make CPAP use more comfortable.  8. Cleaning   Clean your mask, tubing and headgear on a regular basis. Put this time in your schedule so that you don't forget to do it. Check and replace the filters for your CPAP unit and humidifier.    9. Completion   Although you are never finished with CPAP therapy, you should reward yourself by celebrating the completion of your first month of treatment. Expect this first month to be your hardest period of adjustment. It will involve some trial and error as you find the machine, mask and pressure settings that are right for you.    10. Continuation  After your first month of treatment, continue to make a daily commitment to use your CPAP all night, every night and for every nap.    CPAP-Tips to starting with success:  Begin using your CPAP for short periods of time during the day while you watch TV or read.    Use CPAP every night and for every nap. Using it less often reduces the health benefits and makes it harder for your body to get used to it.    Newer CPAP models are virtually silent; however, if you find the sound of your CPAP machine to be bothersome, place the unit under your bed to dampen the sound.     Make small adjustments to your mask, tubing, straps and headgear until you get the right fit. Tightening the mask may actually worsen the leak.  If it leaves significant marks on your face or irritates the bridge of your nose, it may not be the best mask for you.  Speak with the person who supplied the mask and consider trying other masks. Insurances will allow you " to try different masks during the first month of starting CPAP.  Insurance also covers a new mask, hose and filter about every 6 months.    Use a saline nasal spray to ease mild nasal congestion. Neti-Pot or saline nasal rinses may also help. Nasal gel sprays can help reduce nasal dryness.  Biotene mouthwash can be helpful to protect your teeth if you experience frequent dry mouth.  Dry mouth may be a sign of air escaping out of your mouth or out of the mask in the case of a full face mask.  Speak with your provider if you expect that is the case.     Take a nasal decongestant to relieve more severe nasal or sinus congestion.  Do not use Afrin (oxymetazoline) nasal spray more than 3 days in a row.  Speak with your sleep doctor if your nasal congestion is chronic.    Use a heated humidifier that fits your CPAP model to enhance your breathing comfort. Adjust the heat setting up if you get a dry nose or throat, down if you get condensation in the hose or mask.  Position the CPAP lower than you so that any condensation in the hose drains back into the machine rather than towards the mask.    Try a system that uses nasal pillows if traditional masks give you problems.    Clean your mask, tubing and headgear once a week. Make sure the equipment dries fully.    Regularly check and replace the filters for your CPAP unit and humidifier.    Work closely with your sleep provider and your CPAP supplier to make sure that you have the machine, mask and air pressure setting that works best for you. It is better to stop using it and call your provider to solve problems than to lay awake all night frustrated with the device.    Weight Loss:    Weight loss decreases severity of sleep apnea in most people with obesity. For those with mild obesity who have developed snoring with weight gain, even 15-30 pound weight loss can improve and occasionally eliminate sleep apnea.  Structured and life-long dietary and health habits are necessary  to lose weight and keep healthier weight levels.     Though there are significant health benefits from weight loss, long-term weight loss is very difficult to achieve- studies show success with dietary management in less than 10% of people. In addition, substantial weight loss may require years of dietary control and may be difficult if patients have severe obesity. In these cases, surgical management may be considered.    If you are interested in methods for weight loss, you should review the options discussed at the National Institutes of Health patient information sites:     http://win.niddk.nih.gov/publications/index.htm  http:/www.health.nih.gov/topic/WeightLossDieting    Bariatric programs offer counseling in all methods of weight loss:    Http:/www.[facility].org/Specialties/WeightLossSurgeryandMedicalMgmt/htm    Your BMI is 39.48 kg/m .    Weight management plan: Patient was referred to their PCP to discuss a diet and exercise plan.    Body mass index (BMI) is one way to tell whether you are at a healthy weight, overweight, or obese. It measures your weight in relation to your height.  A BMI of 18.5 to 24.9 is in the healthy range. A person with a BMI of 25 to 29.9 is considered overweight, and someone with a BMI of 30 or greater is considered obese.  Another way to find out if you are at risk for health problems caused by overweight and obesity is to measure your waist. If you are a woman and your waist is more than 35 inches, or if you are a man and your waist is more than 40 inches, your risk of disease may be higher.  More than two-thirds of American adults are considered overweight or obese. Being overweight or obese increases the risk for further weight gain.  Excess weight may lead to heart disease and diabetes. Creating and following plans for healthy eating and physical activity may help you improve your health.    Methods for maintaining or losing weight.    Weight control is part of healthy  lifestyle and includes exercise, emotional health, and healthy eating habits.  Careful eating habits lifelong is the mainstay of weight control.  Though there are significant health benefits from weight loss, long-term weight loss with diet alone may be very difficult to achieve- studies show long-term success with dietary management in less than 10% of people. Attaining a healthy weight may be especially difficult to achieve in those with severe obesity. In some cases, medications, devices and surgical management might be considered.    What can you do?    If you are overweight or obese and are interested in methods for weight loss, you should discuss this with your provider. In addition, we recommend that you review healthy life styles and methods for weight loss available through the National Institutes of Health patient information sites:     http://win.niddk.nih.gov/publications/index.html

## 2022-02-12 ENCOUNTER — HEALTH MAINTENANCE LETTER (OUTPATIENT)
Age: 72
End: 2022-02-12

## 2022-02-17 PROBLEM — H90.41 SENSORINEURAL HEARING LOSS, UNILATERAL, RIGHT EAR, WITH UNRESTRICTED HEARING ON THE CONTRALATERAL SIDE: Status: ACTIVE | Noted: 2019-02-26

## 2022-04-09 ENCOUNTER — HEALTH MAINTENANCE LETTER (OUTPATIENT)
Age: 72
End: 2022-04-09

## 2022-10-09 ENCOUNTER — HEALTH MAINTENANCE LETTER (OUTPATIENT)
Age: 72
End: 2022-10-09

## 2023-03-25 ENCOUNTER — HEALTH MAINTENANCE LETTER (OUTPATIENT)
Age: 73
End: 2023-03-25

## 2023-05-21 ENCOUNTER — HEALTH MAINTENANCE LETTER (OUTPATIENT)
Age: 73
End: 2023-05-21

## 2023-08-21 ENCOUNTER — OFFICE VISIT (OUTPATIENT)
Dept: SLEEP MEDICINE | Facility: CLINIC | Age: 73
End: 2023-08-21
Payer: COMMERCIAL

## 2023-08-21 VITALS
HEIGHT: 64 IN | DIASTOLIC BLOOD PRESSURE: 68 MMHG | OXYGEN SATURATION: 96 % | SYSTOLIC BLOOD PRESSURE: 118 MMHG | BODY MASS INDEX: 38.76 KG/M2 | WEIGHT: 227 LBS | HEART RATE: 67 BPM | RESPIRATION RATE: 18 BRPM

## 2023-08-21 DIAGNOSIS — G47.33 OSA (OBSTRUCTIVE SLEEP APNEA): Primary | ICD-10-CM

## 2023-08-21 PROCEDURE — 99213 OFFICE O/P EST LOW 20 MIN: CPT | Performed by: NURSE PRACTITIONER

## 2023-08-21 ASSESSMENT — SLEEP AND FATIGUE QUESTIONNAIRES
HOW LIKELY ARE YOU TO NOD OFF OR FALL ASLEEP WHILE WATCHING TV: WOULD NEVER DOZE
HOW LIKELY ARE YOU TO NOD OFF OR FALL ASLEEP WHILE SITTING AND TALKING TO SOMEONE: WOULD NEVER DOZE
HOW LIKELY ARE YOU TO NOD OFF OR FALL ASLEEP WHILE SITTING AND READING: SLIGHT CHANCE OF DOZING
HOW LIKELY ARE YOU TO NOD OFF OR FALL ASLEEP WHILE LYING DOWN TO REST IN THE AFTERNOON WHEN CIRCUMSTANCES PERMIT: SLIGHT CHANCE OF DOZING
HOW LIKELY ARE YOU TO NOD OFF OR FALL ASLEEP WHEN YOU ARE A PASSENGER IN A CAR FOR AN HOUR WITHOUT A BREAK: WOULD NEVER DOZE
HOW LIKELY ARE YOU TO NOD OFF OR FALL ASLEEP WHILE SITTING QUIETLY AFTER LUNCH WITHOUT ALCOHOL: WOULD NEVER DOZE
HOW LIKELY ARE YOU TO NOD OFF OR FALL ASLEEP WHILE SITTING INACTIVE IN A PUBLIC PLACE: WOULD NEVER DOZE
HOW LIKELY ARE YOU TO NOD OFF OR FALL ASLEEP IN A CAR, WHILE STOPPED FOR A FEW MINUTES IN TRAFFIC: WOULD NEVER DOZE

## 2023-08-21 NOTE — PATIENT INSTRUCTIONS
"MY INFORMATION ON SLEEP APNEA-  Mary Carrera    DOCTOR : DEX Venegas CNP  SLEEP CENTER :      MY CONTACT NUMBER:   [facility]  [phone]  [facility]   [phone]  [facility]   [phone]      [facility]  [phone]  [facility]   [phone]    [facility] [place] [address], [address]  [place] [address]  Phone: [phone]    Hours:  Mon - Fri: 8:00 a.m. - 4:30 p.m.  Sat: Closed  Sun: Closed      Key Points:  1. What is Obstructive Sleep Apnea (MANNIE)? MANNIE is the most common type of sleep apnea. Apnea literally means, \"without breath.\" It is characterized by repetitive pauses in breathing, despite continued effort to breathe, and is usually associated with a reduction in blood oxygen saturation. Apneas can last 10 to over 60 seconds. It is caused by narrowing or collapse of the upper airway as muscles relax during sleep.   2. What are the consequences of MANNIE? Symptoms include: daytime sleepiness- possibly increasing the risk of falling asleep while driving, unrefreshing/restless sleep, snoring, insomnia, waking frequently to urinate, waking with heartburn or reflux, reduced concentration and memory, and morning headaches. Other health consequences may include development of high blood pressure. Untreated MANNIE also can contribute to heart disease, stroke and diabetes.   3. What are the treatment options? In most situations, sleep apnea is a lifelong disease that must be managed with daily therapy. Continuous Positive Airway (CPAP) is the most reliable treatment. A mouthguard to hold your jaw forward is usually the next most reliable option. Other options include postioning devices (to keep you off your back), nasal valves, tongue retaining device, weight loss, surgery. There is more detail about these options toward the end of this " document.  4. What are the most important things to remember about using CPAP?     WHERE CAN I FIND MORE INFORMATION?    American Academy of Sleep Medicine Patient information on sleep disorders:  http://yoursleep.aasmnet.org    CPAP -  WHY AND HOW?                 Continuous positive airway pressure, or CPAP, is the most effective treatment for obstructive sleep apnea. It works by blowing room air, through a mask, to hold your throat open. A decision to use CPAP is a major step forward in the pursuit of a healthier life. The successful use of CPAP will help you breathe easier, sleep better and live healthier. Using CPAP can be a positive experience if you keep these matthews points in mind:  Commitment  CPAP is not a quick fix for your problem. It involves a long-term commitment to improve your sleep and your health.    Communication  Stay in close communication with both your sleep doctor and your CPAP supplier. Ask lots of questions and seek help when you need it.    Consistency  Use CPAP all night, every night and for every nap. You will receive the maximum health benefits from CPAP when you use it every time that you sleep. This will also make it easier for your body to adjust to the treatment.    Correction  The first machine and mask that you try may not be the best ones for you. Work with your sleep doctor and your CPAP supplier to make corrections to your equipment selection. Ask about trying a different type of machine or mask if you have ongoing problems. Make sure that your mask is a good fit and learn to use your equipment properly.    Challenge  Tell a family member or close friend to ask you each morning if you used your CPAP the previous night. Have someone to challenge you to give it your best effort.    Connection   Your adjustment to CPAP will be easier if you are able to connect with others who use the same treatment. Ask your sleep doctor if there is a support group in your area for people who have  "sleep apnea, or look for one on the Internet.  Comfort   Increase your level of comfort by using a saline spray, decongestant or heated humidifier if CPAP irritates your nose, mouth or throat. Use your unit's \"ramp\" setting to slowly get used to the air pressure level. There may be soft pads you can buy that will fit over your mask straps. Look on www.CPAP.com for accessories that can help make CPAP use more comfortable.  Cleaning   Clean your mask, tubing and headgear on a regular basis. Put this time in your schedule so that you don't forget to do it. Check and replace the filters for your CPAP unit and humidifier.    Completion   Although you are never finished with CPAP therapy, you should reward yourself by celebrating the completion of your first month of treatment. Expect this first month to be your hardest period of adjustment. It will involve some trial and error as you find the machine, mask and pressure settings that are right for you.    Continuation  After your first month of treatment, continue to make a daily commitment to use your CPAP all night, every night and for every nap.    CPAP-Tips to starting with success:  Begin using your CPAP for short periods of time during the day while you watch TV or read.    Use CPAP every night and for every nap. Using it less often reduces the health benefits and makes it harder for your body to get used to it.    Newer CPAP models are virtually silent; however, if you find the sound of your CPAP machine to be bothersome, place the unit under your bed to dampen the sound.     Make small adjustments to your mask, tubing, straps and headgear until you get the right fit. Tightening the mask may actually worsen the leak.  If it leaves significant marks on your face or irritates the bridge of your nose, it may not be the best mask for you.  Speak with the person who supplied the mask and consider trying other masks. Insurances will allow you to try different masks " during the first month of starting CPAP.  Insurance also covers a new mask, hose and filter about every 6 months.    Use a saline nasal spray to ease mild nasal congestion. Neti-Pot or saline nasal rinses may also help. Nasal gel sprays can help reduce nasal dryness.  Biotene mouthwash can be helpful to protect your teeth if you experience frequent dry mouth.  Dry mouth may be a sign of air escaping out of your mouth or out of the mask in the case of a full face mask.  Speak with your provider if you expect that is the case.     Take a nasal decongestant to relieve more severe nasal or sinus congestion.  Do not use Afrin (oxymetazoline) nasal spray more than 3 days in a row.  Speak with your sleep doctor if your nasal congestion is chronic.    Use a heated humidifier that fits your CPAP model to enhance your breathing comfort. Adjust the heat setting up if you get a dry nose or throat, down if you get condensation in the hose or mask.  Position the CPAP lower than you so that any condensation in the hose drains back into the machine rather than towards the mask.    Try a system that uses nasal pillows if traditional masks give you problems.    Clean your mask, tubing and headgear once a week. Make sure the equipment dries fully.    Regularly check and replace the filters for your CPAP unit and humidifier.    Work closely with your sleep provider and your CPAP supplier to make sure that you have the machine, mask and air pressure setting that works best for you. It is better to stop using it and call your provider to solve problems than to lay awake all night frustrated with the device.  Weight Loss:    Weight loss decreases severity of sleep apnea in most people with obesity. For those with mild obesity who have developed snoring with weight gain, even 15-30 pound weight loss can improve and occasionally eliminate sleep apnea.  Structured and life-long dietary and health habits are necessary to lose weight and keep  healthier weight levels.     Though there are significant health benefits from weight loss, long-term weight loss is very difficult to achieve- studies show success with dietary management in less than 10% of people. In addition, substantial weight loss may require years of dietary control and may be difficult if patients have severe obesity. In these cases, surgical management may be considered.    If you are interested in methods for weight loss, you should review the options discussed at the National Institutes of Health patient information sites:     http://win.niddk.nih.gov/publications/index.htm  http:/www.health.nih.gov/topic/WeightLossDieting    Bariatric programs offer counseling in all methods of weight loss:    Http:/www.[facility].org/Specialties/WeightLossSurgeryandMedicalMgmt/htm    Your BMI is Body mass index is 38.96 kg/m .    Weight management plan: Patient was referred to their PCP to discuss a diet and exercise plan.    Body mass index (BMI) is one way to tell whether you are at a healthy weight, overweight, or obese. It measures your weight in relation to your height.  A BMI of 18.5 to 24.9 is in the healthy range. A person with a BMI of 25 to 29.9 is considered overweight, and someone with a BMI of 30 or greater is considered obese.  Another way to find out if you are at risk for health problems caused by overweight and obesity is to measure your waist. If you are a woman and your waist is more than 35 inches, or if you are a man and your waist is more than 40 inches, your risk of disease may be higher.  More than two-thirds of American adults are considered overweight or obese. Being overweight or obese increases the risk for further weight gain.  Excess weight may lead to heart disease and diabetes. Creating and following plans for healthy eating and physical activity may help you improve your health.    Methods for maintaining or losing weight.    Weight control is part of healthy  lifestyle and includes exercise, emotional health, and healthy eating habits.  Careful eating habits lifelong is the mainstay of weight control.  Though there are significant health benefits from weight loss, long-term weight loss with diet alone may be very difficult to achieve- studies show long-term success with dietary management in less than 10% of people. Attaining a healthy weight may be especially difficult to achieve in those with severe obesity. In some cases, medications, devices and surgical management might be considered.    What can you do?    If you are overweight or obese and are interested in methods for weight loss, you should discuss this with your provider. In addition, we recommend that you review healthy life styles and methods for weight loss available through the National Institutes of Health patient information sites:     http://win.niddk.nih.gov/publications/index.htm

## 2023-08-21 NOTE — PROGRESS NOTES
Sleep Apnea - Follow-up Visit:    Impression/Plan:  1. MANNIE (obstructive sleep apnea)  - Comprehensive DME    Moderate Sleep apnea. Tolerating PAP well. Daytime symptoms are improved.  She continues to use and benefit from PAP therapy. She would like a new/replacement APAP device as her current device is greater than 5 years old.    A review of her most recent APAP download data (April - May 2023) available as patient did not bring her device in for download today, however, shows excellent use and compliance and moderate MANNIE that is well controlled on current pressure settings.  Of note, there was an elevated air leak seen on download data.    Continue current plan of care.  We will place an order for new/updated APAP device today.  A comprehensive DME order was placed for new/replacement APAP device, nasal pillow mask and supplies and sent to [facility] today.    Mary Carrera will follow up in about 2 month(s) after obtaining new/replacement APAP device to review download data and use/compliance..     20 minutes spent with patient, all of which were spent face-to-face counseling, consulting, chart review/documentation, and coordinating plan of care on the date of the encounter.      DEX Venegas CNP  Sleep Medicine      CC:  No Ref-Primary, Physician,         History of Present Illness:  Chief Complaint   Patient presents with    CPAP Follow Up       Mary Carrera is a 73-year-old female with a PMH pertinent for HTN, hypercholesterolemia, benign neoplasm of cranial nerve, hearing loss, osteoarthritis, history of alcohol dependence-in remission, MANNIE, and obesity presents for annual follow-up of their moderate sleep apnea, managed with CPAP.  The patient was last seen on 1/14/2022 with me via virtual visit for follow-up MANNIE on CPAP therapy.  She also has a travel APAP device (Resmed Air Mini) and uses only when travelling. She loves it. Overall, she is doing well on therapy and has  no concerns at this visit but would like a prescription for new/replacement APAP device as hers is greater than 5 years old.    Previous Study Results: [facility]  Date: 1/23/2017.  Weight 226.0 pounds.  AHI: 29.7/hr. RDI 36.7/hr. O2 no 75.3%.  Time spent less than or equal to 88% was 16.9 minutes.   PLMI: 50.0/hr. PLMAI: 21.1/hr.    DME: [facility]    Do you use a CPAP Machine at home: Yes  Overall, on a scale of 0-10 how would you rate your CPAP (0 poor, 10 great): 9    What type of mask do you use: Nasal Pillow  Is your mask comfortable: Yes  If not, why:      Is your mask leaking: No  If yes, where do you feel it:    How many night per week does the mask leak (0-7):      Do you notice snoring with mask on: No  Do you notice gasping arousals with mask on: No  Are you having significant oral or nasal dryness: No  Is the pressure setting comfortable: Yes  If not, why:      What is your typical bedtime: 1030  How long does it take you to go to sleep on PAP therapy: quickly  What time do you typically get out of bed for the day: 800  How many hours on average per night are you using PAP therapy: 8  How many hours are you sleeping per night: 8  Do you feel well rested in the morning: Yes      ResMed AirSense 10  Auto-PAP 9 - 18 cmH2O 30 day usage data:  4/25/23 - 5/24/23  97% of days with > 4 hours of use. 1/30 days with no use.   Average use 8 hours 26 minutes per day.   95%ile Leak 25.7 L/min.   CPAP 95% pressure 12.8 cm.   AHI 0.4 events per hour.            EPWORTH SLEEPINESS SCALE         8/21/2023    12:26 PM    [place] Sleepiness Scale ( JESSICA Goode  [id number]<br>ESS - [place]/English - Final version - 21 Nov 07 - [facility] [place].)   Sitting and reading Slight chance of dozing   Watching TV Would never doze   Sitting, inactive in a public place (e.g. a theatre or a meeting) Would never doze   As a passenger in a car for an hour without a break Would never doze   Lying down to rest in the afternoon when  circumstances permit Slight chance of dozing   Sitting and talking to someone Would never doze   Sitting quietly after a lunch without alcohol Would never doze   In a car, while stopped for a few minutes in traffic Would never doze   Conroy Score (MC) 2   Conroy Score (Sleep) 2       INSOMNIA SEVERITY INDEX (EZRA)          8/21/2023    12:21 PM   Insomnia Severity Index (EZRA)   Difficulty falling asleep 1   Difficulty staying asleep 1   Problems waking up too early 0   How SATISFIED/DISSATISFIED are you with your CURRENT sleep pattern? 0   How NOTICEABLE to others do you think your sleep problem is in terms of impairing the quality of your life? 0   How WORRIED/DISTRESSED are you about your current sleep problem? 0   To what extent do you consider your sleep problem to INTERFERE with your daily functioning (e.g. daytime fatigue, mood, ability to function at work/daily chores, concentration, memory, mood, etc.) CURRENTLY? 0   EZRA Total Score 2       Guidelines for Scoring/Interpretation:  Total score categories:  0-7 = No clinically significant insomnia   8-14 = Subthreshold insomnia   15-21 = Clinical insomnia (moderate severity)  22-28 = Clinical insomnia (severe)  Used via courtesy of www.Uniphore[place]alth.va.gov with permission from Zaid Mac PhD., [facility]      Past medical/surgical history, family history, social history, medications and allergies were reviewed.        Problem List:  Patient Active Problem List    Diagnosis Date Noted    Morbid obesity (H) 02/07/2020     Priority: Medium    Essential hypertension, benign 05/09/2019     Priority: Medium    Dizziness 02/26/2019     Priority: Medium    Asymmetrical right sensorineural hearing loss 02/26/2019     Priority: Medium     Replacing diagnoses that were inactivated after the 10/1/2021 regulatory import.      Alcohol dependence in remission (H) 01/11/2019     Priority: Medium    S/P total knee arthroplasty, left 09/12/2017     Priority: Medium     MANNIE (obstructive sleep apnea) 01/25/2017     Priority: Medium     Diagnostic Study  Sleep Study 1/23/2017 - (226.0 lbs) AHI 29.7, RDI 36.7, Supine AHI -, REM AHI 91.6, Low O2 75.3%, Time Spent ?88% 16.9 minutes / Time Spent ?89% 34.8 minutes.      Osteoarthritis of right knee 12/05/2016     Priority: Medium    ACP (advance care planning) 04/08/2014     Priority: Medium       Advance Care Planning 1/20/2016: ACP Review of Chart / Resources Provided:  Reviewed chart for advance care plan.  Mary Block Debi has no plan or code status on file. Discussed available resources and provided with information. Confirmed code status reflects current choices pending further ACP discussions.  Confirmed/documented legally designated decision maker(s). Added by Mahnaz Britt  Advance Care Planning 9/7/2017: ACP Review of Chart / Resources Provided:  Reviewed chart for advance care plan.  Mary Block Debi has no plan or code status on file however states presence of ACP document. Copy requested.   Added by [facility] 09/06/2013     Priority: Medium     State Tier Level:  Tier 1  Status:  n/a  Care Coordinator:  n/a     See Letters for [facility] Care Plan          Alopecia areata 08/16/2013     Priority: Medium    Pure hypercholesterolemia 08/17/2007     Priority: Medium    Benign neoplasm of cranial nerve (H) 09/29/2006     Priority: Medium     Problem list name updated by automated process. Provider to review      Hearing loss 09/29/2006     Priority: Medium     Problem list name updated by automated process. Provider to review      Obesity 01/30/2004     Priority: Medium     Problem list name updated by automated process. Provider to review      Leiomyoma of uterus 01/14/2002     Priority: Medium     Problem list name updated by automated process. Provider to review        Current Outpatient Medications   Medication    ibuprofen (ADVIL/MOTRIN) 800 MG tablet    multivitamin  with lutein (OCUVITE  "WITH LTEIN) CAPS    sertraline (ZOLOFT) 100 MG tablet    atorvastatin (LIPITOR) 20 MG tablet    lisinopril-hydrochlorothiazide (ZESTORETIC) 10-12.5 MG tablet     No current facility-administered medications for this visit.       /68   Pulse 67   Resp 18   Ht 1.626 m (5' 4\")   Wt 103 kg (227 lb)   LMP 10/15/2003   SpO2 96%   BMI 38.96 kg/m        This note was written with the assistance of the Dragon voice-dictation technology software. The final document, although reviewed, may contain errors. For corrections, please contact the office.    "

## 2023-09-28 ENCOUNTER — APPOINTMENT (OUTPATIENT)
Dept: SLEEP MEDICINE | Facility: CLINIC | Age: 73
End: 2023-09-28
Payer: COMMERCIAL

## 2023-09-28 ENCOUNTER — DOCUMENTATION ONLY (OUTPATIENT)
Dept: SLEEP MEDICINE | Facility: CLINIC | Age: 73
End: 2023-09-28

## 2023-09-28 DIAGNOSIS — G47.33 OSA (OBSTRUCTIVE SLEEP APNEA): Primary | ICD-10-CM

## 2023-09-28 NOTE — PROGRESS NOTES
Patient was offered choice of vendor and chose [facility].  Patient Mary Carrera was set up at [facility]  on September 28, 2023. Patient received a Resmed Airsense 11. Pressures were set at  9-18 cm H2O. Patient s ramp is 5 cm H2O for auto and FLEX/EPR is EPR, 2.  Patient received a Resmed Mask name: Airfit P10 for Her pillow mask (with bundled small pillows), heated tubing and heated humidifier. Patient needs compliant usage and sleep follow up appointment for insurance compliance. Patient has a follow up on 12/8/2023 with DEX Venegas, CNP.    Stefania Neri

## 2023-12-08 ENCOUNTER — VIRTUAL VISIT (OUTPATIENT)
Dept: SLEEP MEDICINE | Facility: CLINIC | Age: 73
End: 2023-12-08
Payer: COMMERCIAL

## 2023-12-08 VITALS — BODY MASS INDEX: 37.49 KG/M2 | HEIGHT: 65 IN | WEIGHT: 225 LBS

## 2023-12-08 DIAGNOSIS — G47.33 OSA (OBSTRUCTIVE SLEEP APNEA): Primary | ICD-10-CM

## 2023-12-08 PROCEDURE — 99213 OFFICE O/P EST LOW 20 MIN: CPT | Mod: VID | Performed by: NURSE PRACTITIONER

## 2023-12-08 ASSESSMENT — SLEEP AND FATIGUE QUESTIONNAIRES
HOW LIKELY ARE YOU TO NOD OFF OR FALL ASLEEP WHILE SITTING INACTIVE IN A PUBLIC PLACE: WOULD NEVER DOZE
HOW LIKELY ARE YOU TO NOD OFF OR FALL ASLEEP WHILE SITTING AND TALKING TO SOMEONE: WOULD NEVER DOZE
HOW LIKELY ARE YOU TO NOD OFF OR FALL ASLEEP WHILE WATCHING TV: WOULD NEVER DOZE
HOW LIKELY ARE YOU TO NOD OFF OR FALL ASLEEP WHILE SITTING AND READING: WOULD NEVER DOZE
HOW LIKELY ARE YOU TO NOD OFF OR FALL ASLEEP WHEN YOU ARE A PASSENGER IN A CAR FOR AN HOUR WITHOUT A BREAK: SLIGHT CHANCE OF DOZING
HOW LIKELY ARE YOU TO NOD OFF OR FALL ASLEEP IN A CAR, WHILE STOPPED FOR A FEW MINUTES IN TRAFFIC: WOULD NEVER DOZE
HOW LIKELY ARE YOU TO NOD OFF OR FALL ASLEEP WHILE LYING DOWN TO REST IN THE AFTERNOON WHEN CIRCUMSTANCES PERMIT: SLIGHT CHANCE OF DOZING
HOW LIKELY ARE YOU TO NOD OFF OR FALL ASLEEP WHILE SITTING QUIETLY AFTER LUNCH WITHOUT ALCOHOL: WOULD NEVER DOZE

## 2023-12-08 ASSESSMENT — PAIN SCALES - GENERAL: PAINLEVEL: NO PAIN (0)

## 2023-12-08 NOTE — NURSING NOTE
Has patient had flu shot for current/most recent flu season? If so, when? Yes: 10/2023 per pt    Is the patient currently in the state of [place]? YES    Visit mode:VIDEO    If the visit is dropped, the patient can be reconnected by: VIDEO VISIT: Text to cell phone:   Telephone Information:   Mobile [phone]       Will anyone else be joining the visit? NO  (If patient encounters technical issues they should call [phone])    How would you like to obtain your AVS? MyChart    Are changes needed to the allergy or medication list? No    Reason for visit: RECHECK    No other vitals to report per pt    Maricarmen DELGADOF

## 2023-12-08 NOTE — PROGRESS NOTES
"Virtual Visit Details    Type of service:  Video Visit   Video Start Time: {video visit start/end time for provider to select:026990}  Video End Time:{video visit start/end time for provider to select:032239}    Originating Location (pt. Location): {video visit patient location:079639::\"Home\"}  {PROVIDER LOCATION On-site should be selected for visits conducted from your clinic location or adjoining [facility] hospital, academic office, or other nearby [facility] building. Off-site should be selected for all other provider locations, including home:649631}  Distant Location (provider location):  {virtual location provider:272954}  Platform used for Video Visit: {Virtual Visit Platforms:973737::\"[email or web address]\"}  "

## 2023-12-08 NOTE — PROGRESS NOTES
Video-Visit Details    Video Start Time: 10:13 AM  Type of service:  Video Visit  Video End Time:10:22 AM    Originating Location (pt. Location): Home  Distant Location (provider location):  Off-site  Platform used for Video Visit: [facility]     Sleep Apnea - Follow-up Visit:    Impression/Plan:  1. MANNIE (obstructive sleep apnea)    Moderate Sleep apnea. Tolerating PAP well. Daytime symptoms are improved. Patient has been in [place] the last 5 weeks and using her new travel CPAP (Resmed AirMini) so new home APAP device does not have current data for this review looking at the last 30 days of data. It appears she has already met compliance usage requirements on her new APAP device at this time.  She continues to use and benefit from PAP therapy.    A review of her previous APAP download data the previous 30 days (9/28/23 - 10/27/23) she was 100% compliant on the AirSense 11 device and her moderate MANNIE was well-controlled on current pressure settings.     Continue current plan of care.  No new orders placed at this visit.    aMry Carrera will follow up in about 1 year(s).     Twenty-five minutes spent with patient, all of which were spent face-to-face counseling, consulting, chart review/documentation, and coordinating plan of care on the date of the encounter.      DEX Venegas CNP  Sleep Medicine      CC:  No Ref-Primary, Physician,         History of Present Illness:  Chief Complaint   Patient presents with    RECHECK       Mary Carrera is a 73-year-old female with a PMH pertinent for HTN, hypercholesterolemia, benign neoplasm of cranial nerve, hearing loss, osteoarthritis, history of alcohol dependence-in remission, MANNIE, and obesity who presents for compliance follow-up of their moderate sleep apnea, managed with CPAP.  She was last seen in an office visit on 8/21/2023 with me in annual follow-up for MANNIE and a comprehensive DME order was placed for new/replacement APAP device was made at that time.  Patient needs compliant usage and sleep follow up appointment for insurance compliance.     Previous Study Results: [facility]  Date: 1/23/2017.  Weight 226.0 pounds.  AHI: 29.7/hr. RDI 36.7/hr. O2 no 75.3%.  Time spent less than or equal to 88% was 16.9 minutes.   PLMI: 50.0/hr. PLMAI: 21.1/hr.     DME: [facility]    Do you use a CPAP Machine at home: Yes  Overall, on a scale of 0-10 how would you rate your CPAP (0 poor, 10 great): 10    What type of mask do you use: Nasal Pillow  Is your mask comfortable: Yes  If not, why:      Is your mask leaking:    If yes, where do you feel it:    How many night per week does the mask leak (0-7):      Do you notice snoring with mask on: No  Do you notice gasping arousals with mask on: No  Are you having significant oral or nasal dryness: No  Is the pressure setting comfortable: Yes  If not, why:      What is your typical bedtime: 10:00  How long does it take you to go to sleep on PAP therapy: 5-10 minutes  What time do you typically get out of bed for the day: 7:00  How many hours on average per night are you using PAP therapy: 8  How many hours are you sleeping per night: 8  Do you feel well rested in the morning: Yes      ResMed AirSense 11 (set up on 9/28/2023 at [facility])  Auto-PAP 9.0 - 18.0 cmH2O 30 day usage data:  11/08/23 - 12/07/23  20% of days with > 4 hours of use. 24/30 days with no use.   Average use 97 minutes per day.   95%ile Leak 10.8 L/min.   CPAP 95% pressure 13.6 cm.   AHI 1.3 events per hour.        EPWORTH SLEEPINESS SCALE         12/8/2023     9:56 AM    Randall Sleepiness Scale ( JESSICA Goode  [id number]<br>ESS - [place]/English - Final version - 21 Nov 07 - [facility] [employer].)   Sitting and reading Would never doze   Watching TV Would never doze   Sitting, inactive in a public place (e.g. a theatre or a meeting) Would never doze   As a passenger in a car for an hour without a break Slight chance of dozing   Lying down to rest in the afternoon when  circumstances permit Slight chance of dozing   Sitting and talking to someone Would never doze   Sitting quietly after a lunch without alcohol Would never doze   In a car, while stopped for a few minutes in traffic Would never doze   [place] Score (MC) 2   [place] Score (Sleep) 2       INSOMNIA SEVERITY INDEX (EZRA)          12/8/2023     9:51 AM   Insomnia Severity Index (EZRA)   Difficulty falling asleep 0   Difficulty staying asleep 0   Problems waking up too early 1   How SATISFIED/DISSATISFIED are you with your CURRENT sleep pattern? 1   How NOTICEABLE to others do you think your sleep problem is in terms of impairing the quality of your life? 0   How WORRIED/DISTRESSED are you about your current sleep problem? 0   To what extent do you consider your sleep problem to INTERFERE with your daily functioning (e.g. daytime fatigue, mood, ability to function at work/daily chores, concentration, memory, mood, etc.) CURRENTLY? 1   EZRA Total Score 3       Guidelines for Scoring/Interpretation:  Total score categories:  0-7 = No clinically significant insomnia   8-14 = Subthreshold insomnia   15-21 = Clinical insomnia (moderate severity)  22-28 = Clinical insomnia (severe)  Used via courtesy of www.Prim[place].gov with permission from Zaid Mac PhD., [facility]      Past medical/surgical history, family history, social history, medications and allergies were reviewed.        Problem List:  Patient Active Problem List    Diagnosis Date Noted    Morbid obesity (H) 02/07/2020     Priority: Medium    Essential hypertension, benign 05/09/2019     Priority: Medium    Dizziness 02/26/2019     Priority: Medium    Asymmetrical right sensorineural hearing loss 02/26/2019     Priority: Medium     Replacing diagnoses that were inactivated after the 10/1/2021 regulatory import.      Alcohol dependence in remission (H) 01/11/2019     Priority: Medium    S/P total knee arthroplasty, left 09/12/2017     Priority: Medium     MANNIE (obstructive sleep apnea) 01/25/2017     Priority: Medium     Diagnostic Study  Sleep Study 1/23/2017 - (226.0 lbs) AHI 29.7, RDI 36.7, Supine AHI -, REM AHI 91.6, Low O2 75.3%, Time Spent ?88% 16.9 minutes / Time Spent ?89% 34.8 minutes.      Osteoarthritis of right knee 12/05/2016     Priority: Medium    ACP (advance care planning) 04/08/2014     Priority: Medium       Advance Care Planning 1/20/2016: ACP Review of Chart / Resources Provided:  Reviewed chart for advance care plan.  Mary Block Debi has no plan or code status on file. Discussed available resources and provided with information. Confirmed code status reflects current choices pending further ACP discussions.  Confirmed/documented legally designated decision maker(s). Added by Mahnaz Britt  Advance Care Planning 9/7/2017: ACP Review of Chart / Resources Provided:  Reviewed chart for advance care plan.  Mary Block Debi has no plan or code status on file however states presence of ACP document. Copy requested.   Added by [facility] 09/06/2013     Priority: Medium     State Tier Level:  Tier 1  Status:  n/a  Care Coordinator:  n/a     See Letters for [facility] Care Plan          Alopecia areata 08/16/2013     Priority: Medium    Pure hypercholesterolemia 08/17/2007     Priority: Medium    Benign neoplasm of cranial nerve (H) 09/29/2006     Priority: Medium     Problem list name updated by automated process. Provider to review      Hearing loss 09/29/2006     Priority: Medium     Problem list name updated by automated process. Provider to review      Obesity 01/30/2004     Priority: Medium     Problem list name updated by automated process. Provider to review      Leiomyoma of uterus 01/14/2002     Priority: Medium     Problem list name updated by automated process. Provider to review        Current Outpatient Medications   Medication    ibuprofen (ADVIL/MOTRIN) 800 MG tablet    multivitamin  with lutein (OCUVITE  "WITH LTEIN) CAPS    sertraline (ZOLOFT) 100 MG tablet    atorvastatin (LIPITOR) 20 MG tablet    lisinopril-hydrochlorothiazide (ZESTORETIC) 10-12.5 MG tablet     No current facility-administered medications for this visit.       Ht 1.651 m (5' 5\")   Wt 102.1 kg (225 lb)   LMP 10/15/2003   BMI 37.44 kg/m      This note was written with the assistance of the Dragon voice-dictation technology software. The final document, although reviewed, may contain errors. For corrections, please contact the office.      "

## 2023-12-08 NOTE — PATIENT INSTRUCTIONS
"MY INFORMATION ON SLEEP APNEA-  Mary Carrera    DOCTOR : DEX Venegas CNP  SLEEP CENTER :      MY CONTACT NUMBER:   [facility]  [phone]  [facility]   [phone]  [facility]   [phone]      [facility]  [phone]  [facility]   [phone]    [facility] [place] [address], [address]  [place] [address]  Phone: [phone]    Hours:  Mon - Fri: 8:00 a.m. - 4:30 p.m.  Sat: Closed  Sun: Closed      Key Points:  1. What is Obstructive Sleep Apnea (MANNIE)? MANNIE is the most common type of sleep apnea. Apnea literally means, \"without breath.\" It is characterized by repetitive pauses in breathing, despite continued effort to breathe, and is usually associated with a reduction in blood oxygen saturation. Apneas can last 10 to over 60 seconds. It is caused by narrowing or collapse of the upper airway as muscles relax during sleep.   2. What are the consequences of MANNIE? Symptoms include: daytime sleepiness- possibly increasing the risk of falling asleep while driving, unrefreshing/restless sleep, snoring, insomnia, waking frequently to urinate, waking with heartburn or reflux, reduced concentration and memory, and morning headaches. Other health consequences may include development of high blood pressure. Untreated MANNIE also can contribute to heart disease, stroke and diabetes.   3. What are the treatment options? In most situations, sleep apnea is a lifelong disease that must be managed with daily therapy. Continuous Positive Airway (CPAP) is the most reliable treatment. A mouthguard to hold your jaw forward is usually the next most reliable option. Other options include postioning devices (to keep you off your back), nasal valves, tongue retaining device, weight loss, surgery. There is more detail about these options toward the end of this " document.  4. What are the most important things to remember about using CPAP?     WHERE CAN I FIND MORE INFORMATION?    American Academy of Sleep Medicine Patient information on sleep disorders:  http://yoursleep.aasmnet.org    CPAP -  WHY AND HOW?                 Continuous positive airway pressure, or CPAP, is the most effective treatment for obstructive sleep apnea. It works by blowing room air, through a mask, to hold your throat open. A decision to use CPAP is a major step forward in the pursuit of a healthier life. The successful use of CPAP will help you breathe easier, sleep better and live healthier. Using CPAP can be a positive experience if you keep these matthews points in mind:  Commitment  CPAP is not a quick fix for your problem. It involves a long-term commitment to improve your sleep and your health.    Communication  Stay in close communication with both your sleep doctor and your CPAP supplier. Ask lots of questions and seek help when you need it.    Consistency  Use CPAP all night, every night and for every nap. You will receive the maximum health benefits from CPAP when you use it every time that you sleep. This will also make it easier for your body to adjust to the treatment.    Correction  The first machine and mask that you try may not be the best ones for you. Work with your sleep doctor and your CPAP supplier to make corrections to your equipment selection. Ask about trying a different type of machine or mask if you have ongoing problems. Make sure that your mask is a good fit and learn to use your equipment properly.    Challenge  Tell a family member or close friend to ask you each morning if you used your CPAP the previous night. Have someone to challenge you to give it your best effort.    Connection   Your adjustment to CPAP will be easier if you are able to connect with others who use the same treatment. Ask your sleep doctor if there is a support group in your area for people who have  "sleep apnea, or look for one on the Internet.  Comfort   Increase your level of comfort by using a saline spray, decongestant or heated humidifier if CPAP irritates your nose, mouth or throat. Use your unit's \"ramp\" setting to slowly get used to the air pressure level. There may be soft pads you can buy that will fit over your mask straps. Look on www.CPAP.com for accessories that can help make CPAP use more comfortable.  Cleaning   Clean your mask, tubing and headgear on a regular basis. Put this time in your schedule so that you don't forget to do it. Check and replace the filters for your CPAP unit and humidifier.    Completion   Although you are never finished with CPAP therapy, you should reward yourself by celebrating the completion of your first month of treatment. Expect this first month to be your hardest period of adjustment. It will involve some trial and error as you find the machine, mask and pressure settings that are right for you.    Continuation  After your first month of treatment, continue to make a daily commitment to use your CPAP all night, every night and for every nap.    CPAP-Tips to starting with success:  Begin using your CPAP for short periods of time during the day while you watch TV or read.    Use CPAP every night and for every nap. Using it less often reduces the health benefits and makes it harder for your body to get used to it.    Newer CPAP models are virtually silent; however, if you find the sound of your CPAP machine to be bothersome, place the unit under your bed to dampen the sound.     Make small adjustments to your mask, tubing, straps and headgear until you get the right fit. Tightening the mask may actually worsen the leak.  If it leaves significant marks on your face or irritates the bridge of your nose, it may not be the best mask for you.  Speak with the person who supplied the mask and consider trying other masks. Insurances will allow you to try different masks " during the first month of starting CPAP.  Insurance also covers a new mask, hose and filter about every 6 months.    Use a saline nasal spray to ease mild nasal congestion. Neti-Pot or saline nasal rinses may also help. Nasal gel sprays can help reduce nasal dryness.  Biotene mouthwash can be helpful to protect your teeth if you experience frequent dry mouth.  Dry mouth may be a sign of air escaping out of your mouth or out of the mask in the case of a full face mask.  Speak with your provider if you expect that is the case.     Take a nasal decongestant to relieve more severe nasal or sinus congestion.  Do not use Afrin (oxymetazoline) nasal spray more than 3 days in a row.  Speak with your sleep doctor if your nasal congestion is chronic.    Use a heated humidifier that fits your CPAP model to enhance your breathing comfort. Adjust the heat setting up if you get a dry nose or throat, down if you get condensation in the hose or mask.  Position the CPAP lower than you so that any condensation in the hose drains back into the machine rather than towards the mask.    Try a system that uses nasal pillows if traditional masks give you problems.    Clean your mask, tubing and headgear once a week. Make sure the equipment dries fully.    Regularly check and replace the filters for your CPAP unit and humidifier.    Work closely with your sleep provider and your CPAP supplier to make sure that you have the machine, mask and air pressure setting that works best for you. It is better to stop using it and call your provider to solve problems than to lay awake all night frustrated with the device.    Weight Loss:    Weight loss decreases severity of sleep apnea in most people with obesity. For those with mild obesity who have developed snoring with weight gain, even 15-30 pound weight loss can improve and occasionally eliminate sleep apnea.  Structured and life-long dietary and health habits are necessary to lose weight and  keep healthier weight levels.     Though there are significant health benefits from weight loss, long-term weight loss is very difficult to achieve- studies show success with dietary management in less than 10% of people. In addition, substantial weight loss may require years of dietary control and may be difficult if patients have severe obesity. In these cases, surgical management may be considered.    If you are interested in methods for weight loss, you should review the options discussed at the National Institutes of Health patient information sites:     http://win.niddk.nih.gov/publications/index.htm  http:/www.health.nih.gov/topic/WeightLossDieting    Bariatric programs offer counseling in all methods of weight loss:    Http:/www.[facility].org/Specialties/WeightLossSurgeryandMedicalMgmt/htm    Your BMI is Body mass index is 37.44 kg/m .    Weight management plan: Patient was referred to their PCP to discuss a diet and exercise plan.    Body mass index (BMI) is one way to tell whether you are at a healthy weight, overweight, or obese. It measures your weight in relation to your height.  A BMI of 18.5 to 24.9 is in the healthy range. A person with a BMI of 25 to 29.9 is considered overweight, and someone with a BMI of 30 or greater is considered obese.  Another way to find out if you are at risk for health problems caused by overweight and obesity is to measure your waist. If you are a woman and your waist is more than 35 inches, or if you are a man and your waist is more than 40 inches, your risk of disease may be higher.  More than two-thirds of American adults are considered overweight or obese. Being overweight or obese increases the risk for further weight gain.  Excess weight may lead to heart disease and diabetes. Creating and following plans for healthy eating and physical activity may help you improve your health.    Methods for maintaining or losing weight.    Weight control is part of healthy  lifestyle and includes exercise, emotional health, and healthy eating habits.  Careful eating habits lifelong is the mainstay of weight control.  Though there are significant health benefits from weight loss, long-term weight loss with diet alone may be very difficult to achieve- studies show long-term success with dietary management in less than 10% of people. Attaining a healthy weight may be especially difficult to achieve in those with severe obesity. In some cases, medications, devices and surgical management might be considered.    What can you do?    If you are overweight or obese and are interested in methods for weight loss, you should discuss this with your provider. In addition, we recommend that you review healthy life styles and methods for weight loss available through the National Institutes of Health patient information sites:     http://win.niddk.nih.gov/publications/index.htm

## 2023-12-18 NOTE — NURSING NOTE
Sent 1 year reminder via zePASS.     Ashlie Grayling   Clinic Assistant  [facility]

## 2024-05-25 ENCOUNTER — HEALTH MAINTENANCE LETTER (OUTPATIENT)
Age: 74
End: 2024-05-25

## 2024-09-06 DIAGNOSIS — G47.33 OBSTRUCTIVE SLEEP APNEA (ADULT) (PEDIATRIC): Primary | ICD-10-CM

## 2025-06-14 ENCOUNTER — HEALTH MAINTENANCE LETTER (OUTPATIENT)
Age: 75
End: 2025-06-14

## (undated) DEVICE — SU STRATAFIX PDS PLUS 1 CT-1 18" SXPP1A404

## (undated) DEVICE — SUCTION MANIFOLD DORNOCH ULTRA CART UL-CL500

## (undated) DEVICE — LINEN BACK PACK 5440

## (undated) DEVICE — TOURNIQUET CUFF 34" REPRO BROWN 60-7070-106

## (undated) DEVICE — SU STRATAFIXÂ PDO 2-0 24CMX24CM MH DA SXPD2B408

## (undated) DEVICE — SU VICRYL 0 CT-1 36" J946H

## (undated) DEVICE — LINEN TOWEL PACK X5 5464

## (undated) DEVICE — BLADE SAW RECIP STRK 70X12.5X1.2MM 0277-096-281

## (undated) DEVICE — BLADE SAW SAGITTAL STRK 19.5X90X1.27MM 2108-109-000S11

## (undated) DEVICE — SU DERMABOND ADVANCED .7ML DNX12

## (undated) DEVICE — SOL NACL 0.9% IRRIG 1000ML BOTTLE 2F7124

## (undated) DEVICE — SOL WATER IRRIG 1000ML BOTTLE 2F7114

## (undated) DEVICE — SU PDS II 0 CT-1 27" Z340H

## (undated) DEVICE — NDL SPINAL 22GA 3.5" QUINCKE 405181

## (undated) DEVICE — DRSG AQUACEL AG 3.5X9.75" HYDROFIBER 412011

## (undated) DEVICE — SUCTION IRR SYSTEM W/O TIP INTERPULSE HANDPIECE 0210-100-000

## (undated) DEVICE — DRAPE U-DRAPE 1015NSD NON-STERILE

## (undated) DEVICE — Device

## (undated) DEVICE — GLOVE PROTEXIS POWDER FREE 8.0 ORTHOPEDIC 2D73ET80

## (undated) DEVICE — CAST PADDING 6" STERILE 9046S

## (undated) DEVICE — BLADE KNIFE SURG 11 371111

## (undated) DEVICE — HOOD FLYTE W/PEELAWAY 408-800-100

## (undated) DEVICE — GOWN XLG DISP 9545

## (undated) DEVICE — ESU GROUND PAD ADULT W/CORD E7507

## (undated) DEVICE — ESU CLEANER TIP 31142717

## (undated) DEVICE — STRAP KNEE/BODY 31143004

## (undated) DEVICE — BLADE CLIPPER SGL USE 9680

## (undated) DEVICE — BONE CEMENT MIXEVAC III HI VAC KIT  0206-015-000

## (undated) DEVICE — LIGHT HANDLE X2

## (undated) DEVICE — DRSG TEGADERM 4X4 3/4" 1626W

## (undated) DEVICE — SU MONOCRYL 3-0 PS-1 27" Y936H

## (undated) DEVICE — BASIN SET MAJOR

## (undated) DEVICE — PREP CHLORAPREP 26ML TINTED ORANGE  260815

## (undated) DEVICE — EYE PREP BETADINE 5% SOLUTION 30ML 0065-0411-30

## (undated) DEVICE — BONE CLEANING TIP INTERPULSE  0210-010-000

## (undated) DEVICE — DRAIN HEMOVAC RESERVOIR KIT 10FR 1/8" MED 00-2550-002-10

## (undated) DEVICE — SOL NACL 0.9% IRRIG 3000ML BAG 2B7477

## (undated) DEVICE — GLOVE PROTEXIS BLUE W/NEU-THERA 8.5  2D73EB85

## (undated) DEVICE — SU MONOCRYL 3-0 PS-2 18" UND Y497G

## (undated) DEVICE — DRSG DRAIN 4X4" 7086

## (undated) RX ORDER — FENTANYL CITRATE 50 UG/ML
INJECTION, SOLUTION INTRAMUSCULAR; INTRAVENOUS
Status: DISPENSED
Start: 2017-09-12

## (undated) RX ORDER — CELECOXIB 200 MG/1
CAPSULE ORAL
Status: DISPENSED
Start: 2017-09-12

## (undated) RX ORDER — ACETAMINOPHEN 500 MG
TABLET ORAL
Status: DISPENSED
Start: 2017-09-12

## (undated) RX ORDER — CEFAZOLIN SODIUM 2 G/100ML
INJECTION, SOLUTION INTRAVENOUS
Status: DISPENSED
Start: 2017-09-12

## (undated) RX ORDER — CEFAZOLIN SODIUM 1 G/3ML
INJECTION, POWDER, FOR SOLUTION INTRAMUSCULAR; INTRAVENOUS
Status: DISPENSED
Start: 2017-09-12

## (undated) RX ORDER — DEXAMETHASONE SODIUM PHOSPHATE 4 MG/ML
INJECTION, SOLUTION INTRA-ARTICULAR; INTRALESIONAL; INTRAMUSCULAR; INTRAVENOUS; SOFT TISSUE
Status: DISPENSED
Start: 2017-09-12

## (undated) RX ORDER — FENTANYL CITRATE 50 UG/ML
INJECTION, SOLUTION INTRAMUSCULAR; INTRAVENOUS
Status: DISPENSED
Start: 2019-08-23

## (undated) RX ORDER — EPHEDRINE SULFATE 50 MG/ML
INJECTION, SOLUTION INTRAMUSCULAR; INTRAVENOUS; SUBCUTANEOUS
Status: DISPENSED
Start: 2017-09-12

## (undated) RX ORDER — TRAMADOL HYDROCHLORIDE 50 MG/1
TABLET ORAL
Status: DISPENSED
Start: 2017-09-12

## (undated) RX ORDER — ONDANSETRON 2 MG/ML
INJECTION INTRAMUSCULAR; INTRAVENOUS
Status: DISPENSED
Start: 2017-09-12